# Patient Record
Sex: FEMALE | Race: BLACK OR AFRICAN AMERICAN | NOT HISPANIC OR LATINO | Employment: FULL TIME | URBAN - METROPOLITAN AREA
[De-identification: names, ages, dates, MRNs, and addresses within clinical notes are randomized per-mention and may not be internally consistent; named-entity substitution may affect disease eponyms.]

---

## 2019-03-09 PROBLEM — I10 ESSENTIAL HYPERTENSION: Status: ACTIVE | Noted: 2019-03-09

## 2019-03-09 PROBLEM — E78.2 MIXED HYPERLIPIDEMIA: Status: ACTIVE | Noted: 2019-03-09

## 2019-03-09 PROBLEM — E11.65 UNCONTROLLED TYPE 2 DIABETES MELLITUS WITH HYPERGLYCEMIA: Status: ACTIVE | Noted: 2019-03-09

## 2019-03-09 NOTE — PROGRESS NOTES
Subjective:       Patient ID: Radha Hoffman is a 59 y.o. female.    Chief Complaint: No chief complaint on file.    Diabetes   She presents for her follow-up diabetic visit. She has type 2 diabetes mellitus. Her disease course has been fluctuating. Pertinent negatives for hypoglycemia include no dizziness or hunger. Associated symptoms include fatigue and polydipsia. Pertinent negatives for diabetes include no blurred vision, no foot paresthesias and no weight loss. There are no hypoglycemic complications. Symptoms are worsening. Diabetic complications include peripheral neuropathy. Risk factors for coronary artery disease include diabetes mellitus, dyslipidemia and hypertension. Current diabetic treatment includes insulin injections. She is currently taking insulin pre-breakfast, pre-lunch, pre-dinner and at bedtime. Insulin injections are given by patient. Rotation sites for injection include the abdominal wall. She participates in exercise daily. An ACE inhibitor/angiotensin II receptor blocker is being taken. Eye exam is current (go every 6 months).     Review of Systems   Constitutional: Positive for fatigue. Negative for fever and weight loss.   HENT: Positive for sore throat. Negative for ear pain, postnasal drip, rhinorrhea and sinus pain.    Eyes: Negative for blurred vision and redness.   Respiratory: Negative for cough, chest tightness, shortness of breath and wheezing.    Cardiovascular: Negative for palpitations and leg swelling.   Gastrointestinal: Negative for constipation, diarrhea, nausea and vomiting.   Endocrine: Positive for polydipsia.   Genitourinary: Negative for difficulty urinating and dysuria.   Musculoskeletal: Negative for arthralgias.   Skin: Negative for rash.   Neurological: Negative for dizziness.       Objective:      Physical Exam   Constitutional: She is oriented to person, place, and time. She appears well-developed and well-nourished.   HENT:   Head: Normocephalic and  atraumatic.   Right Ear: Tympanic membrane and external ear normal. Decreased hearing is noted.   Left Ear: Hearing, tympanic membrane and external ear normal.   Nose: Nose normal.   Mouth/Throat: Oropharyngeal exudate and posterior oropharyngeal edema present.   Eyes: Conjunctivae and EOM are normal. Pupils are equal, round, and reactive to light.   Neck: Normal range of motion. Neck supple.   Cardiovascular: Normal rate, regular rhythm and normal heart sounds.   Pulmonary/Chest: Breath sounds normal. She has no wheezes. She has no rales.   Abdominal: Soft. Bowel sounds are normal. She exhibits no distension and no mass. There is no tenderness. There is no guarding.   Musculoskeletal: Normal range of motion. She exhibits no edema or tenderness.   Neurological: She is alert and oriented to person, place, and time. No cranial nerve deficit.   Skin: Skin is warm and dry. No rash noted. No erythema.   Psychiatric: She has a normal mood and affect. Her behavior is normal.       Assessment:       1. Uncontrolled type 2 diabetes mellitus with hyperglycemia    2. Essential hypertension    3. Mixed hyperlipidemia    4. Sore throat        Plan:     I will refill insulin syringes today uin addition to getting labs to see where her blood sugars are and will notify her with results and RTC in 3 months.

## 2019-03-11 ENCOUNTER — OFFICE VISIT (OUTPATIENT)
Dept: FAMILY MEDICINE | Facility: CLINIC | Age: 59
End: 2019-03-11
Payer: MEDICAID

## 2019-03-11 VITALS
DIASTOLIC BLOOD PRESSURE: 77 MMHG | TEMPERATURE: 98 F | HEART RATE: 88 BPM | HEIGHT: 59 IN | OXYGEN SATURATION: 98 % | SYSTOLIC BLOOD PRESSURE: 137 MMHG | WEIGHT: 128 LBS | BODY MASS INDEX: 25.8 KG/M2

## 2019-03-11 DIAGNOSIS — E11.65 UNCONTROLLED TYPE 2 DIABETES MELLITUS WITH HYPERGLYCEMIA: Primary | ICD-10-CM

## 2019-03-11 DIAGNOSIS — I10 ESSENTIAL HYPERTENSION: ICD-10-CM

## 2019-03-11 DIAGNOSIS — E78.2 MIXED HYPERLIPIDEMIA: ICD-10-CM

## 2019-03-11 DIAGNOSIS — J02.9 SORE THROAT: ICD-10-CM

## 2019-03-11 PROCEDURE — 99214 PR OFFICE/OUTPT VISIT, EST, LEVL IV, 30-39 MIN: ICD-10-PCS | Mod: S$GLB,,, | Performed by: FAMILY MEDICINE

## 2019-03-11 PROCEDURE — 99214 OFFICE O/P EST MOD 30 MIN: CPT | Mod: S$GLB,,, | Performed by: FAMILY MEDICINE

## 2019-03-11 RX ORDER — PANTOPRAZOLE SODIUM 40 MG/1
TABLET, DELAYED RELEASE ORAL
COMMUNITY
Start: 2019-02-20 | End: 2019-05-27 | Stop reason: SDUPTHER

## 2019-03-11 RX ORDER — INSULIN GLARGINE 100 [IU]/ML
75 INJECTION, SOLUTION SUBCUTANEOUS NIGHTLY
COMMUNITY
End: 2019-05-14 | Stop reason: CLARIF

## 2019-03-11 RX ORDER — LORATADINE 10 MG/1
TABLET ORAL
COMMUNITY
Start: 2019-02-20 | End: 2019-04-29 | Stop reason: SDUPTHER

## 2019-03-11 RX ORDER — TIMOLOL MALEATE 5 MG/ML
SOLUTION/ DROPS OPHTHALMIC
COMMUNITY
Start: 2019-02-20 | End: 2020-07-09

## 2019-03-11 RX ORDER — DULAGLUTIDE 0.75 MG/.5ML
INJECTION, SOLUTION SUBCUTANEOUS
COMMUNITY
Start: 2019-03-01 | End: 2019-05-03 | Stop reason: CLARIF

## 2019-03-11 RX ORDER — DULOXETIN HYDROCHLORIDE 30 MG/1
CAPSULE, DELAYED RELEASE ORAL
COMMUNITY
Start: 2019-03-03 | End: 2019-12-06 | Stop reason: SDUPTHER

## 2019-03-11 RX ORDER — MIRTAZAPINE 15 MG/1
15 TABLET, FILM COATED ORAL NIGHTLY
COMMUNITY
Start: 2019-03-03 | End: 2020-07-10

## 2019-03-11 RX ORDER — INSULIN LISPRO 100 [IU]/ML
INJECTION, SOLUTION INTRAVENOUS; SUBCUTANEOUS
COMMUNITY
End: 2020-03-16

## 2019-03-11 RX ORDER — LOSARTAN POTASSIUM 100 MG/1
TABLET ORAL
COMMUNITY
Start: 2019-01-23 | End: 2019-04-29 | Stop reason: SDUPTHER

## 2019-03-11 RX ORDER — ATORVASTATIN CALCIUM 40 MG/1
TABLET, FILM COATED ORAL
COMMUNITY
Start: 2019-03-01 | End: 2019-04-02 | Stop reason: SDUPTHER

## 2019-03-11 RX ORDER — MONTELUKAST SODIUM 10 MG/1
TABLET ORAL
COMMUNITY
Start: 2019-02-13 | End: 2019-04-22 | Stop reason: SDUPTHER

## 2019-03-11 RX ORDER — SYRINGE AND NEEDLE,INSULIN,1ML 31GX15/64"
SYRINGE, EMPTY DISPOSABLE MISCELLANEOUS
Qty: 100 SYRINGE | Refills: 5 | COMMUNITY
Start: 2019-03-11 | End: 2019-03-15 | Stop reason: SDUPTHER

## 2019-03-13 ENCOUNTER — TELEPHONE (OUTPATIENT)
Dept: FAMILY MEDICINE | Facility: CLINIC | Age: 59
End: 2019-03-13

## 2019-03-13 RX ORDER — PROMETHAZINE HYDROCHLORIDE AND DEXTROMETHORPHAN HYDROBROMIDE 6.25; 15 MG/5ML; MG/5ML
5 SYRUP ORAL EVERY 6 HOURS PRN
Qty: 140 ML | Refills: 0 | Status: SHIPPED | OUTPATIENT
Start: 2019-03-13 | End: 2019-03-23

## 2019-03-13 NOTE — TELEPHONE ENCOUNTER
----- Message from Angela Hoffman sent at 3/13/2019  2:15 PM CDT -----  Pt is asking for cough suppressant for cough times 2 days, denies fever, pharmacy: [facility] [address];  pts phone # [phone]

## 2019-03-15 DIAGNOSIS — E11.65 UNCONTROLLED TYPE 2 DIABETES MELLITUS WITH HYPERGLYCEMIA: ICD-10-CM

## 2019-03-15 RX ORDER — SYRING-NEEDL,DISP,INSUL,0.3 ML 31GX15/64"
SYRINGE, EMPTY DISPOSABLE MISCELLANEOUS
Qty: 100 SYRINGE | Refills: 11 | Status: SHIPPED | OUTPATIENT
Start: 2019-03-15 | End: 2022-09-14

## 2019-03-15 RX ORDER — INSULIN LISPRO 100 [IU]/ML
INJECTION, SOLUTION INTRAVENOUS; SUBCUTANEOUS
Qty: 15 CARTRIDGE | Refills: 5 | Status: SHIPPED | OUTPATIENT
Start: 2019-03-15 | End: 2020-03-16

## 2019-03-20 ENCOUNTER — TELEPHONE (OUTPATIENT)
Dept: FAMILY MEDICINE | Facility: CLINIC | Age: 59
End: 2019-03-20

## 2019-03-20 DIAGNOSIS — J30.9 ALLERGIC RHINITIS, UNSPECIFIED SEASONALITY, UNSPECIFIED TRIGGER: Primary | ICD-10-CM

## 2019-03-21 NOTE — TELEPHONE ENCOUNTER
Patient requesting a referral to see allergies due to the current sinus and throat pain.  She has seen ENT before but recommendations has not helped.    [facility] will [facility] [place] for further evaluation.

## 2019-04-02 RX ORDER — ATORVASTATIN CALCIUM 40 MG/1
TABLET, FILM COATED ORAL
Qty: 30 TABLET | Refills: 5 | Status: SHIPPED | OUTPATIENT
Start: 2019-04-02 | End: 2019-10-06 | Stop reason: SDUPTHER

## 2019-04-22 RX ORDER — MONTELUKAST SODIUM 10 MG/1
TABLET ORAL
Qty: 30 TABLET | Refills: 5 | Status: SHIPPED | OUTPATIENT
Start: 2019-04-22 | End: 2019-10-27 | Stop reason: SDUPTHER

## 2019-04-29 RX ORDER — LOSARTAN POTASSIUM 100 MG/1
TABLET ORAL
Qty: 30 TABLET | Refills: 5 | Status: SHIPPED | OUTPATIENT
Start: 2019-04-29 | End: 2019-10-27 | Stop reason: SDUPTHER

## 2019-04-29 RX ORDER — LORATADINE 10 MG/1
TABLET ORAL
Qty: 30 TABLET | Refills: 6 | Status: SHIPPED | OUTPATIENT
Start: 2019-04-29 | End: 2020-07-09

## 2019-05-03 DIAGNOSIS — E11.65 UNCONTROLLED TYPE 2 DIABETES MELLITUS WITH HYPERGLYCEMIA: Primary | ICD-10-CM

## 2019-05-14 ENCOUNTER — TELEPHONE (OUTPATIENT)
Dept: FAMILY MEDICINE | Facility: CLINIC | Age: 59
End: 2019-05-14

## 2019-05-14 NOTE — TELEPHONE ENCOUNTER
----- Message from Alicia Quintero sent at 5/14/2019 10:55 AM CDT -----  THE NEW INSULIN YOU PT HER ON THE INS IS NOT COVERING THIS MEDICATION. CAN YOU CALL HER OUT SOMETHING ELSE. SHE DOES NOT REMEMBER THE NAME OF THE MEDICATION!!!

## 2019-05-27 RX ORDER — PANTOPRAZOLE SODIUM 40 MG/1
TABLET, DELAYED RELEASE ORAL
Qty: 30 TABLET | Refills: 6 | Status: SHIPPED | OUTPATIENT
Start: 2019-05-27 | End: 2019-12-06 | Stop reason: SDUPTHER

## 2019-06-10 ENCOUNTER — OFFICE VISIT (OUTPATIENT)
Dept: FAMILY MEDICINE | Facility: CLINIC | Age: 59
End: 2019-06-10
Payer: MEDICAID

## 2019-06-10 VITALS
SYSTOLIC BLOOD PRESSURE: 142 MMHG | WEIGHT: 134 LBS | TEMPERATURE: 98 F | OXYGEN SATURATION: 98 % | BODY MASS INDEX: 27.06 KG/M2 | DIASTOLIC BLOOD PRESSURE: 75 MMHG | HEART RATE: 86 BPM

## 2019-06-10 DIAGNOSIS — E11.8 CONTROLLED TYPE 2 DIABETES MELLITUS WITH COMPLICATION, WITH LONG-TERM CURRENT USE OF INSULIN: ICD-10-CM

## 2019-06-10 DIAGNOSIS — E78.2 MIXED HYPERLIPIDEMIA: ICD-10-CM

## 2019-06-10 DIAGNOSIS — Z79.4 CONTROLLED TYPE 2 DIABETES MELLITUS WITH COMPLICATION, WITH LONG-TERM CURRENT USE OF INSULIN: ICD-10-CM

## 2019-06-10 DIAGNOSIS — I10 ESSENTIAL HYPERTENSION: ICD-10-CM

## 2019-06-10 PROCEDURE — 99214 OFFICE O/P EST MOD 30 MIN: CPT | Mod: S$GLB,,, | Performed by: FAMILY MEDICINE

## 2019-06-10 PROCEDURE — 99214 PR OFFICE/OUTPT VISIT, EST, LEVL IV, 30-39 MIN: ICD-10-PCS | Mod: S$GLB,,, | Performed by: FAMILY MEDICINE

## 2019-06-10 NOTE — PROGRESS NOTES
Subjective:       Patient ID: Radha Hoffman is a 59 y.o. female.    Chief Complaint: Follow-up    59-year-old female in for follow-up.  Patient has a history of diabetes type 2 that had been uncontrolled in the past however at her last visit her hemoglobin A1c had come down to 6.1.  She states she has been really working hard on trying to control her blood sugar much better.  She also has a history of hypertension in her blood pressure is fairly well controlled.  She also has hyperlipidemia and takes her statin faithfully.  She is also seen an allergist specialist in July for a chronic throat irritation.  She reports that she has been doing good and voices no new complaints today.  She does report numbness and tingling of both feet which is likely from neuropathy but does not want to take any medication for it.    Review of Systems   Constitutional: Negative for fever.   HENT: Positive for congestion and sore throat. Negative for ear pain, postnasal drip, rhinorrhea and sinus pain.    Eyes: Negative for redness.   Respiratory: Negative for cough, chest tightness, shortness of breath and wheezing.    Cardiovascular: Negative for chest pain, palpitations and leg swelling.   Gastrointestinal: Negative for constipation, diarrhea, nausea and vomiting.   Genitourinary: Negative for difficulty urinating and dysuria.   Musculoskeletal: Negative for arthralgias.   Skin: Negative for rash.   Neurological: Positive for numbness. Negative for dizziness.       Objective:      Physical Exam   Constitutional: She is oriented to person, place, and time. She appears well-developed and well-nourished.   HENT:   Head: Normocephalic and atraumatic.   Eyes: Pupils are equal, round, and reactive to light. Conjunctivae and EOM are normal.   Neck: Normal range of motion. Neck supple.   Cardiovascular: Normal rate, regular rhythm and normal heart sounds.   Pulmonary/Chest: Breath sounds normal. She has no wheezes. She has no rales.    Abdominal: Soft. Bowel sounds are normal. She exhibits no distension and no mass. There is no tenderness. There is no guarding.   Musculoskeletal: Normal range of motion. She exhibits no edema or tenderness.   Neurological: She is alert and oriented to person, place, and time. No cranial nerve deficit.   Skin: Skin is warm and dry. No rash noted. No erythema.   Psychiatric: She has a normal mood and affect. Her behavior is normal.       Assessment:       1. Controlled type 2 diabetes mellitus with complication, with long-term current use of insulin    2. Essential hypertension    3. Mixed hyperlipidemia        Plan:     diabetes type 2 is chronic and stable on current meds.  Hypertension is chronic and stable on current meds.  Hyperlipidemia is chronic and stable on current meds.  An order for fasting labs will be given today to be done 1 week prior to the next visit in 3 months.  Follow-up in 3 months.

## 2019-07-09 RX ORDER — PEN NEEDLE, DIABETIC 30 GX3/16"
NEEDLE, DISPOSABLE MISCELLANEOUS
Qty: 50 EACH | Refills: 11 | Status: SHIPPED | OUTPATIENT
Start: 2019-07-09 | End: 2020-02-12

## 2019-08-02 RX ORDER — DIPHENOXYLATE HYDROCHLORIDE AND ATROPINE SULFATE 2.5; .025 MG/1; MG/1
TABLET ORAL
Qty: 30 TABLET | Refills: 2 | Status: SHIPPED | OUTPATIENT
Start: 2019-08-02 | End: 2019-12-03 | Stop reason: SDUPTHER

## 2019-09-09 LAB
ALBUMIN SERPL BCP-MCNC: 3.6 G/DL (ref 3.4–5)
ALBUMIN/GLOBULIN RATIO: 0.88 RATIO (ref 1.1–1.8)
ALP SERPL-CCNC: 110 U/L (ref 46–116)
ALT SERPL W P-5'-P-CCNC: 30 U/L (ref 12–78)
AST SERPL-CCNC: 17 U/L (ref 15–37)
BASOPHILS NFR SNV MANUAL: 0.5 % (ref 0–3)
BILIRUB SERPL-MCNC: 0.4 MG/DL (ref 0–1)
BUN SERPL-MCNC: 10 MG/DL (ref 7–18)
BUN/CREAT SERPL: 9.8 RATIO (ref 7–18)
CALCIUM SERPL-MCNC: 9.8 MG/DL (ref 8.8–10.5)
CHLORIDE SERPL-SCNC: 103 MMOL/L (ref 100–108)
CHOLEST SERPL-MSCNC: 136 MG/DL
CO2 SERPL-SCNC: 33 MMOL/L (ref 21–32)
CREAT SERPL-MCNC: 1.02 MG/DL (ref 0.55–1.02)
EOSINOPHIL NFR SNV MANUAL: 1.7 % (ref 1–3)
ERYTHROCYTE [DISTWIDTH] IN BLOOD BY AUTOMATED COUNT: 12.3 % (ref 12.5–18)
GFR ESTIMATION: > 60
GLOBULIN: 4.1 G/DL (ref 2.3–3.5)
GLUCOSE SERPL-MCNC: 298 MG/DL (ref 70–110)
HBA1C MFR BLD: 12.6 % (ref 4.5–6.2)
HCT VFR BLD AUTO: 40.3 % (ref 37–47)
HDL/CHOLESTEROL RATIO: 4 RATIO
HDLC SERPL-MCNC: 32 MG/DL (ref 39–96)
HGB BLD-MCNC: 14 G/DL (ref 12–16)
LDLC SERPL CALC-MCNC: 54 MG/DL
LYMPHOCYTES NFR SNV MANUAL: 43.1 % (ref 25–40)
MANUAL NRBC PER 100 CELLS: 0 %
MCH RBC QN AUTO: 30.7 PG (ref 27–31.2)
MCHC RBC AUTO-ENTMCNC: 34.7 G/DL (ref 31.8–35.4)
MCV RBC AUTO: 88.4 FL (ref 80–97)
MONOCYTES/100 LEUKOCYTES: 6.6 % (ref 1–15)
NEUTROPHILS NFR BLD: 3.04 10*3/UL (ref 1.8–7.7)
NEUTROPHILS NFR SNV MANUAL: 47.8 % (ref 37–80)
PLATELETS: 334 10*3/UL (ref 142–424)
POTASSIUM SERPL-SCNC: 4.1 MMOL/L (ref 3.6–5.2)
PROT SERPL-MCNC: 7.7 G/DL (ref 6.4–8.2)
RBC # BLD AUTO: 4.56 10*6/UL (ref 4.2–5.4)
SODIUM BLD-SCNC: 139 MMOL/L (ref 135–145)
TRIGL SERPL-MCNC: 252 MG/DL (ref 30–200)
TSH SERPL DL<=0.005 MIU/L-ACNC: 0.73 UIU/ML (ref 0.36–3.74)
VLDL CHOLESTEROL: 50 MG/DL (ref 0–40)
WBC # BLD: 6.4 10*3/UL (ref 4.6–10.2)

## 2019-09-16 ENCOUNTER — OFFICE VISIT (OUTPATIENT)
Dept: FAMILY MEDICINE | Facility: CLINIC | Age: 59
End: 2019-09-16
Payer: MEDICAID

## 2019-09-16 VITALS
BODY MASS INDEX: 27.82 KG/M2 | OXYGEN SATURATION: 98 % | WEIGHT: 138 LBS | HEIGHT: 59 IN | SYSTOLIC BLOOD PRESSURE: 143 MMHG | HEART RATE: 89 BPM | TEMPERATURE: 98 F | DIASTOLIC BLOOD PRESSURE: 81 MMHG

## 2019-09-16 DIAGNOSIS — E11.65 UNCONTROLLED TYPE 2 DIABETES MELLITUS WITH HYPERGLYCEMIA: Primary | ICD-10-CM

## 2019-09-16 DIAGNOSIS — I10 ESSENTIAL HYPERTENSION: ICD-10-CM

## 2019-09-16 PROCEDURE — 99214 OFFICE O/P EST MOD 30 MIN: CPT | Mod: S$GLB,,, | Performed by: FAMILY MEDICINE

## 2019-09-16 PROCEDURE — 99214 PR OFFICE/OUTPT VISIT, EST, LEVL IV, 30-39 MIN: ICD-10-PCS | Mod: S$GLB,,, | Performed by: FAMILY MEDICINE

## 2019-09-16 RX ORDER — INSULIN GLARGINE 100 [IU]/ML
INJECTION, SOLUTION SUBCUTANEOUS
COMMUNITY
End: 2019-09-16

## 2019-09-16 RX ORDER — GLIPIZIDE 10 MG/1
TABLET, FILM COATED, EXTENDED RELEASE ORAL
COMMUNITY
End: 2019-09-16

## 2019-09-16 RX ORDER — GLIMEPIRIDE 2 MG/1
2 TABLET ORAL
Qty: 60 TABLET | Refills: 5 | Status: SHIPPED | OUTPATIENT
Start: 2019-09-16 | End: 2019-09-30 | Stop reason: CLARIF

## 2019-09-16 RX ORDER — INSULIN GLARGINE 100 [IU]/ML
50 INJECTION, SOLUTION SUBCUTANEOUS DAILY
Qty: 15 ML | Refills: 3 | Status: SHIPPED | OUTPATIENT
Start: 2019-09-16 | End: 2020-02-11

## 2019-09-16 RX ORDER — FLUTICASONE PROPIONATE 50 MCG
SPRAY, SUSPENSION (ML) NASAL
COMMUNITY
End: 2019-10-18 | Stop reason: SDUPTHER

## 2019-09-16 NOTE — PROGRESS NOTES
Subjective:      Patient ID: Radha Hoffman is a 59 y.o. female.    Chief Complaint: Follow-up and Diabetes    59-year-old female in for follow-up.  She recently had labs done in her hemoglobin A1c was 12.6.  Patient states she is only taking Humalog oak per sliding scale.  When she checks her blood sugar if it is in a certain range then she take the Humalog.  She is not on any of the oral medications or the Bydureon that was prescribed.  She is not taking Levemir as prescribed either.  She has not been compliant with diet or medications.    Hypertension: Patient here for follow-up of elevated blood pressure. She is not exercising and is not adherent to low salt diet.  Blood pressure is well controlled at home. Cardiac symptoms none. Patient denies chest pain, dyspnea and palpitations.  Cardiovascular risk factors: diabetes mellitus, dyslipidemia and hypertension. Use of agents associated with hypertension: none. History of target organ damage: none.    Review of Systems   Constitutional: Negative for fever.   HENT: Negative for ear pain, postnasal drip, rhinorrhea, sinus pain and sore throat.    Eyes: Negative for redness.   Respiratory: Negative for cough, chest tightness, shortness of breath and wheezing.    Cardiovascular: Negative for chest pain, palpitations and leg swelling.   Gastrointestinal: Negative for constipation, diarrhea, nausea and vomiting.   Genitourinary: Negative for difficulty urinating and dysuria.   Musculoskeletal: Negative for arthralgias.   Skin: Negative for rash.   Neurological: Negative for dizziness.     Medication List with Changes/Refills   New Medications    EXENATIDE MICROSPHERES (BYDUREON) 2 MG/0.65 ML PNIJ    Inject 2 mg into the skin every 7 days.    GLIMEPIRIDE (AMARYL) 2 MG TABLET    Take 1 tablet (2 mg total) by mouth before breakfast.    INSULIN (LANTUS SOLOSTAR U-100 INSULIN) GLARGINE 100 UNITS/ML (3ML) SUBQ PEN    Inject 50 Units into the skin once daily.   Current  "Medications    ALLERGY RELIEF, LORATADINE, 10 MG TABLET    TAKE 1 TABLET BY MOUTH ONCE DAILY    ATORVASTATIN (LIPITOR) 40 MG TABLET    TAKE 1 TABLET BY MOUTH ONCE DAILY FOR 30 DAYS    BD VEO INSULIN SYRINGE UF 1 ML 31 GAUGE X 15/64" SYRG    USE AS DIRECTED    DIPHENOXYLATE-ATROPINE 2.5-0.025 MG (LOMOTIL) 2.5-0.025 MG PER TABLET    TAKE 1 TABLET BY MOUTH EVERY 8 HOURS FOR 10 DAYS    DULOXETINE (CYMBALTA) 30 MG CAPSULE        FLUTICASONE PROPIONATE (FLONASE) 50 MCG/ACTUATION NASAL SPRAY    fluticasone 50 mcg/actuation nasal spray,suspension    HUMALOG U-100 INSULIN 100 UNIT/ML CRTG    INJECT 15 UNITS SUB Q THREE TIMES A DAY WITH EACH MEAL    INSULIN LISPRO 100 UNIT/ML INJECTION    Inject into the skin 3 (three) times daily before meals.    LOSARTAN (COZAAR) 100 MG TABLET    TAKE 1 TABLET BY MOUTH ONCE DAILY FOR 30 DAYS    MIRTAZAPINE (REMERON) 15 MG TABLET        MONTELUKAST (SINGULAIR) 10 MG TABLET    TAKE 1 TABLET BY MOUTH ONCE DAILY FOR 30 DAYS    ONETOUCH ULTRA BLUE TEST STRIP STRP    USE  STRIP TO CHECK GLUCOSE THREE TIMES DAILY    PANTOPRAZOLE (PROTONIX) 40 MG TABLET    TAKE 1 TABLET BY MOUTH ONCE DAILY    PEN NEEDLE, DIABETIC 31 GAUGE X 5/16" NDLE    USE TO GIVE INSULIN ONCE A DAY AT BEDTIME    RANITIDINE (ZANTAC) 150 MG TABLET    TAKE 1 TABLET BY MOUTH TWICE DAILY FOR 30 DAYS    TIMOLOL MALEATE 0.5% (TIMOPTIC) 0.5 % DROP       Discontinued Medications    EXENATIDE MICROSPHERES (BYDUREON BCISE) 2 MG/0.85 ML ATIN    Inject 2 mg into the skin every 7 days.    GLIPIZIDE (GLUCOTROL) 10 MG TR24    glipizide ER 10 mg tablet, extended release 24 hr    INSULIN (BASAGLAR KWIKPEN U-100 INSULIN) GLARGINE 100 UNITS/ML (3ML) SUBQ PEN    Basaglar KwikPen U-100 Insulin 100 unit/mL (3 mL) subcutaneous    INSULIN DETEMIR U-100 (LEVEMIR FLEXTOUCH U-100 INSULN) 100 UNIT/ML (3 ML) SUBQ INPN PEN    Inject 70 Units into the skin every evening.      Objective:   BP (!) 143/81 (BP Location: Left arm, Patient Position: Sitting, BP " "Method: Medium (Automatic))   Pulse 89   Temp 97.8 °F (36.6 °C) (Temporal)   Ht 4' 11" (1.499 m)   Wt 62.6 kg (138 lb)   SpO2 98%   BMI 27.87 kg/m²    Estimated body mass index is 27.87 kg/m² as calculated from the following:    Height as of this encounter: 4' 11" (1.499 m).    Weight as of this encounter: 62.6 kg (138 lb).   Physical Exam   Constitutional: She is oriented to person, place, and time. Vital signs are normal. She appears well-developed and well-nourished.   HENT:   Head: Normocephalic and atraumatic.   Right Ear: Hearing and tympanic membrane normal.   Left Ear: Hearing and tympanic membrane normal.   Nose: Nose normal.   Mouth/Throat: Uvula is midline, oropharynx is clear and moist and mucous membranes are normal.   Eyes: Pupils are equal, round, and reactive to light. Conjunctivae and EOM are normal.   Neck: Normal range of motion. Neck supple.   Cardiovascular: Normal rate, regular rhythm and normal heart sounds.   Pulmonary/Chest: Breath sounds normal. She has no wheezes. She has no rales.   Abdominal: Soft. Bowel sounds are normal. She exhibits no distension and no mass. There is no tenderness. There is no guarding.   Musculoskeletal: Normal range of motion. She exhibits no edema or tenderness.   Neurological: She is alert and oriented to person, place, and time. No cranial nerve deficit.   Skin: Skin is warm and dry. No rash noted. No erythema.   Psychiatric: She has a normal mood and affect. Her speech is normal and behavior is normal. Judgment and thought content normal. Cognition and memory are normal.   Nursing note and vitals reviewed.    Lab Results   Component Value Date    WBC 6.4 09/09/2019    HGB 14.0 09/09/2019    HCT 40.3 09/09/2019    CHOL 136 09/09/2019    TRIG 252 (H) 09/09/2019    HDL 32 (L) 09/09/2019    ALT 30 09/09/2019    AST 17 09/09/2019     09/09/2019    K 4.1 09/09/2019     09/09/2019    CREATININE 1.02 09/09/2019    BUN 10 09/09/2019    CO2 33 (H) " 09/09/2019    TSH 0.73 09/09/2019    HGBA1C 12.6 (H) 09/09/2019      Assessment:      Problem List Items Addressed This Visit        Cardiac/Vascular    Essential hypertension    Current Assessment & Plan       Hypertension is chronic and usually well controlled on losartan.            Endocrine    Uncontrolled type 2 diabetes mellitus with hyperglycemia - Primary    Current Assessment & Plan       Diabetes is chronic and not well controlled.  I spent most of this visit educating patient on her medication that she should be taking and proper way to take them.  I will restart her on Lantus 50 units q.h.s. every day, Bydureon 2 mg subcu weekly and glimepiride 2 mg by mouth twice a day.  She was unable to take metformin due to side effects.  Patient reports she has not been compliant with medications due to side effects.  Her blood sugar was dropping too low and she will get weak so she stopped all her meds except humalog.         Relevant Medications    glimepiride (AMARYL) 2 MG tablet    insulin (LANTUS SOLOSTAR U-100 INSULIN) glargine 100 units/mL (3mL) SubQ pen    exenatide microspheres (BYDUREON) 2 mg/0.65 mL PnIj    Other Relevant Orders    Hemoglobin A1c    Comprehensive metabolic panel    CBC auto differential    Lipid panel    TSH           Plan:   Patient will be given a refill of Bydureon, Lantus and glimepiride.  She is strongly encouraged to be compliant with diet and all medication as directed and notify me if she is having any side effects.  She will have orders to repeat prior to her follow-up visit in 3 months.

## 2019-09-17 NOTE — ASSESSMENT & PLAN NOTE
Diabetes is chronic and not well controlled.  I spent most of this visit educating patient on her medication that she should be taking and proper way to take them.  I will restart her on Lantus 50 units q.h.s. every day, Bydureon 2 mg subcu weekly and glimepiride 2 mg by mouth twice a day.  She was unable to take metformin due to side effects.  Patient reports she has not been compliant with medications due to side effects.  Her blood sugar was dropping too low and she will get weak so she stopped all her meds except humalog.

## 2019-09-20 ENCOUNTER — TELEPHONE (OUTPATIENT)
Dept: FAMILY MEDICINE | Facility: CLINIC | Age: 59
End: 2019-09-20

## 2019-09-20 NOTE — TELEPHONE ENCOUNTER
Patient was started on Bydureon once weekly to to help lower her blood sugar however insurance denied it due to they are requesting proved that she tried metformin.  Patient states it was many years ago when she tried metformin and was unable to tolerate it due to diarrhea.  I will have patient's daughter call on her behalf to see she can appeal this decision.  We will give her 3 samples of I during intends in the meantime.

## 2019-09-30 ENCOUNTER — TELEPHONE (OUTPATIENT)
Dept: FAMILY MEDICINE | Facility: CLINIC | Age: 59
End: 2019-09-30

## 2019-09-30 DIAGNOSIS — E11.65 UNCONTROLLED TYPE 2 DIABETES MELLITUS WITH HYPERGLYCEMIA: ICD-10-CM

## 2019-09-30 DIAGNOSIS — E11.65 UNCONTROLLED TYPE 2 DIABETES MELLITUS WITH HYPERGLYCEMIA: Primary | ICD-10-CM

## 2019-09-30 RX ORDER — GLIMEPIRIDE 2 MG/1
TABLET ORAL
Qty: 60 TABLET | Refills: 3 | Status: SHIPPED | OUTPATIENT
Start: 2019-09-30 | End: 2020-02-11

## 2019-09-30 NOTE — TELEPHONE ENCOUNTER
----- Message from Jessica Pate sent at 9/30/2019  3:08 PM CDT -----  Contact: patients daughter Leanna Hoffman [phone]  Patients daughter called stating Dr Gamino told her to call her about the diabetes injection that medicaid is not covering (Bydureon).Glimepiride pharmacy gave for once a day and she takes twice a day  Pharmacy [facility] on [address]

## 2019-10-05 DIAGNOSIS — E11.65 UNCONTROLLED TYPE 2 DIABETES MELLITUS WITH HYPERGLYCEMIA: Primary | ICD-10-CM

## 2019-10-05 RX ORDER — METFORMIN HYDROCHLORIDE 500 MG/1
500 TABLET ORAL
Qty: 60 TABLET | Refills: 3 | Status: SHIPPED | OUTPATIENT
Start: 2019-10-05 | End: 2020-07-09

## 2019-10-06 RX ORDER — ATORVASTATIN CALCIUM 40 MG/1
TABLET, FILM COATED ORAL
Qty: 30 TABLET | Refills: 5 | Status: SHIPPED | OUTPATIENT
Start: 2019-10-06 | End: 2020-05-08 | Stop reason: SDUPTHER

## 2019-10-18 RX ORDER — FLUTICASONE PROPIONATE 50 MCG
SPRAY, SUSPENSION (ML) NASAL
Qty: 1 BOTTLE | Refills: 3 | Status: SHIPPED | OUTPATIENT
Start: 2019-10-18 | End: 2020-07-09

## 2019-10-24 DIAGNOSIS — E11.65 UNCONTROLLED TYPE 2 DIABETES MELLITUS WITH HYPERGLYCEMIA: ICD-10-CM

## 2019-10-27 ENCOUNTER — TELEPHONE (OUTPATIENT)
Dept: FAMILY MEDICINE | Facility: CLINIC | Age: 59
End: 2019-10-27

## 2019-10-27 RX ORDER — MONTELUKAST SODIUM 10 MG/1
TABLET ORAL
Qty: 30 TABLET | Refills: 5 | Status: SHIPPED | OUTPATIENT
Start: 2019-10-27 | End: 2020-07-09 | Stop reason: SDUPTHER

## 2019-10-27 RX ORDER — LOSARTAN POTASSIUM 100 MG/1
TABLET ORAL
Qty: 30 TABLET | Refills: 5 | Status: SHIPPED | OUTPATIENT
Start: 2019-10-27 | End: 2020-07-09 | Stop reason: SDUPTHER

## 2019-10-28 NOTE — TELEPHONE ENCOUNTER
----- Message from Jessica Pate sent at 10/28/2019 10:06 AM CDT -----  Contact: patient  Was calling to see if there were any samples of the once a week injection for diabetes

## 2019-11-15 ENCOUNTER — TELEPHONE (OUTPATIENT)
Dept: FAMILY MEDICINE | Facility: CLINIC | Age: 59
End: 2019-11-15

## 2019-11-15 NOTE — TELEPHONE ENCOUNTER
----- Message from Jessica Pate sent at 11/15/2019  8:17 AM CST -----  Contact: patient  Needs a sample of Byoderon

## 2019-12-04 RX ORDER — DIPHENOXYLATE HYDROCHLORIDE AND ATROPINE SULFATE 2.5; .025 MG/1; MG/1
TABLET ORAL
Qty: 30 TABLET | Refills: 2 | Status: SHIPPED | OUTPATIENT
Start: 2019-12-04 | End: 2020-04-02

## 2019-12-06 RX ORDER — PANTOPRAZOLE SODIUM 40 MG/1
40 TABLET, DELAYED RELEASE ORAL DAILY
Qty: 30 TABLET | Refills: 5 | Status: SHIPPED | OUTPATIENT
Start: 2019-12-06 | End: 2020-07-09

## 2019-12-06 RX ORDER — DULOXETIN HYDROCHLORIDE 30 MG/1
30 CAPSULE, DELAYED RELEASE ORAL DAILY
Qty: 30 CAPSULE | Refills: 5 | Status: SHIPPED | OUTPATIENT
Start: 2019-12-06 | End: 2020-07-10

## 2019-12-06 NOTE — TELEPHONE ENCOUNTER
----- Message from Alexandra Mathews sent at 12/6/2019 10:01 AM CST -----  Pt pharmacy told her these medications are on recall she will have to be prescribed something else    losartan (COZAAR) 100 MG tablet   ranitidine (ZANTAC) 150 MG tablet       Refill request    pantoprazole (PROTONIX) 40 MG tablet    DULoxetine (CYMBALTA) 30 MG capsule

## 2019-12-09 LAB
ABS NRBC COUNT: 0 X 10 3/UL (ref 0–0.01)
ABSOLUTE BASOPHIL: 0.03 X 10 3/UL (ref 0–0.22)
ABSOLUTE EOSINOPHIL: 0.17 X 10 3/UL (ref 0.04–0.54)
ABSOLUTE IMMATURE GRAN: 0.02 X 10 3/UL (ref 0–0.04)
ABSOLUTE LYMPHOCYTE: 2.49 X 10 3/UL (ref 0.86–4.75)
ABSOLUTE MONOCYTE: 0.45 X 10 3/UL (ref 0.22–1.08)
ALBUMIN SERPL-MCNC: 4.2 G/DL (ref 3.5–5.2)
ALBUMIN/GLOB SERPL ELPH: 1.4 {RATIO} (ref 1–2.7)
ALP ISOS SERPL LEV INH-CCNC: 94 U/L (ref 35–105)
ALT (SGPT): 22 U/L (ref 0–33)
ANION GAP SERPL CALC-SCNC: 8 MMOL/L (ref 8–17)
AST SERPL-CCNC: 21 U/L (ref 0–32)
BASOPHILS NFR BLD: 0.5 % (ref 0.2–1.2)
BILIRUBIN, TOTAL: 0.36 MG/DL (ref 0–1.2)
BUN/CREAT SERPL: 8 (ref 6–20)
CALCIUM SERPL-MCNC: 9.8 MG/DL (ref 8.6–10.2)
CARBON DIOXIDE, CO2: 31 MMOL/L (ref 22–29)
CHLORIDE: 103 MMOL/L (ref 98–107)
CHOLEST SERPL-MSCNC: 102 MG/DL (ref 100–200)
CREAT SERPL-MCNC: 0.8 MG/DL (ref 0.5–0.9)
EOSINOPHIL NFR BLD: 2.6 % (ref 0.7–7)
ESTIMATED AVERAGE GLUCOSE: 287 MG/DL
GFR ESTIMATION: 72.92
GLOBULIN: 2.9 G/DL (ref 1.5–4.5)
GLUCOSE: 213 MG/DL (ref 82–115)
HBA1C MFR BLD: 11.6 % (ref 4–6)
HCT VFR BLD AUTO: 39.5 % (ref 37–47)
HDLC SERPL-MCNC: 31 MG/DL
HGB BLD-MCNC: 13 G/DL (ref 12–16)
IMMATURE GRANULOCYTES: 0.3 % (ref 0–0.5)
LDL/HDL RATIO: 1.3 (ref 1–3)
LDLC SERPL CALC-MCNC: 41.6 MG/DL (ref 0–100)
LYMPHOCYTES NFR BLD: 38.2 % (ref 19.3–53.1)
MCH RBC QN AUTO: 30.3 PG (ref 27–32)
MCHC RBC AUTO-ENTMCNC: 32.9 G/DL (ref 32–36)
MCV RBC AUTO: 92.1 FL (ref 82–100)
MONOCYTES NFR BLD: 6.9 % (ref 4.7–12.5)
NEUTROPHILS ABSOLUTE COUNT: 3.36 X 10 3/UL (ref 2.15–7.56)
NEUTROPHILS NFR BLD: 51.5 %
NUCLEATED RED BLOOD CELLS: 0 /100 WBC (ref 0–0.2)
PLATELET # BLD AUTO: 320 X 10 3/UL (ref 135–400)
POTASSIUM: 5.1 MMOL/L (ref 3.5–5.1)
PROT SNV-MCNC: 7.1 G/DL (ref 6.4–8.3)
RBC # BLD AUTO: 4.29 X 10 6/UL (ref 4.2–5.4)
RDW-SD: 42.6 FL (ref 37–54)
SODIUM: 142 MMOL/L (ref 136–145)
TRIGL SERPL-MCNC: 147 MG/DL (ref 0–150)
TSH SERPL DL<=0.005 MIU/L-ACNC: 0.52 UIU/ML (ref 0.27–4.2)
UREA NITROGEN (BUN): 6.4 MG/DL (ref 8–23)
WBC # BLD: 6.52 X 10 3/UL (ref 4.3–10.8)

## 2019-12-11 DIAGNOSIS — E11.65 UNCONTROLLED TYPE 2 DIABETES MELLITUS WITH HYPERGLYCEMIA: Primary | ICD-10-CM

## 2019-12-16 ENCOUNTER — OFFICE VISIT (OUTPATIENT)
Dept: FAMILY MEDICINE | Facility: CLINIC | Age: 59
End: 2019-12-16
Payer: MEDICAID

## 2019-12-16 VITALS
SYSTOLIC BLOOD PRESSURE: 140 MMHG | WEIGHT: 138.25 LBS | DIASTOLIC BLOOD PRESSURE: 72 MMHG | OXYGEN SATURATION: 98 % | HEIGHT: 59 IN | HEART RATE: 78 BPM | BODY MASS INDEX: 27.87 KG/M2 | TEMPERATURE: 98 F

## 2019-12-16 DIAGNOSIS — E11.65 UNCONTROLLED TYPE 2 DIABETES MELLITUS WITH HYPERGLYCEMIA: ICD-10-CM

## 2019-12-16 DIAGNOSIS — E78.2 MIXED HYPERLIPIDEMIA: ICD-10-CM

## 2019-12-16 DIAGNOSIS — K21.9 GERD WITHOUT ESOPHAGITIS: ICD-10-CM

## 2019-12-16 DIAGNOSIS — I10 ESSENTIAL HYPERTENSION: ICD-10-CM

## 2019-12-16 DIAGNOSIS — R19.7 DIARRHEA, UNSPECIFIED TYPE: ICD-10-CM

## 2019-12-16 PROCEDURE — 99214 PR OFFICE/OUTPT VISIT, EST, LEVL IV, 30-39 MIN: ICD-10-PCS | Mod: S$GLB,,, | Performed by: FAMILY MEDICINE

## 2019-12-16 PROCEDURE — 99214 OFFICE O/P EST MOD 30 MIN: CPT | Mod: S$GLB,,, | Performed by: FAMILY MEDICINE

## 2019-12-16 RX ORDER — LORATADINE 10 MG/1
TABLET ORAL
COMMUNITY
End: 2020-07-09

## 2019-12-17 NOTE — PROGRESS NOTES
Subjective:      Patient ID: Radha Hoffman is a 59 y.o. female.    Chief Complaint: Follow-up and Diabetes    60 yo female in for follow upon diabetes.  She finally was able to get Bydureon approved by her insurance.  She has been taking Lantus 60 units daily. She has been with a fasting blood glucose in the 250's range.  She was unable to tolerate the metformin because of diarrhea.  She reports that she has been having diarrhea that has been persistent over the past couple of weeks. She states that she has not had any abdominal pain.  She has been taking Lomotil which has helped. She also has been out of her pantoprazole and ranitidine because the insurance would  Not pay for it and she has been with increased GERD.      Hypertension   This is a chronic problem. The current episode started more than 1 year ago. The problem is unchanged. The problem is controlled. Pertinent negatives include no chest pain, palpitations or shortness of breath. There are no associated agents to hypertension. Risk factors for coronary artery disease include diabetes mellitus, dyslipidemia and sedentary lifestyle. Past treatments include angiotensin blockers.   Hyperlipidemia   This is a chronic problem. The current episode started more than 1 year ago. The problem is controlled. Recent lipid tests were reviewed and are normal. Exacerbating diseases include diabetes. Pertinent negatives include no chest pain or shortness of breath. Current antihyperlipidemic treatment includes statins. Risk factors for coronary artery disease include hypertension, dyslipidemia, diabetes mellitus and a sedentary lifestyle.     Review of Systems   Constitutional: Negative for fever.   HENT: Negative for ear pain, postnasal drip, rhinorrhea, sinus pain and sore throat.    Eyes: Negative for redness.   Respiratory: Negative for cough, chest tightness, shortness of breath and wheezing.    Cardiovascular: Negative for chest pain, palpitations and leg  "swelling.   Gastrointestinal: Positive for diarrhea. Negative for constipation, nausea and vomiting.        GERD   Genitourinary: Negative for difficulty urinating and dysuria.   Musculoskeletal: Negative for arthralgias.   Skin: Negative for rash.   Neurological: Negative for dizziness.     Medication List with Changes/Refills   Current Medications    ALLERGY RELIEF, LORATADINE, 10 MG TABLET    TAKE 1 TABLET BY MOUTH ONCE DAILY    ATORVASTATIN (LIPITOR) 40 MG TABLET    TAKE 1 TABLET BY MOUTH ONCE DAILY FOR 30 DAYS    BD VEO INSULIN SYRINGE UF 1 ML 31 GAUGE X 15/64" SYRG    USE AS DIRECTED    DIPHENOXYLATE-ATROPINE 2.5-0.025 MG (LOMOTIL) 2.5-0.025 MG PER TABLET    TAKE 1 TABLET BY MOUTH EVERY 8 HOURS FOR 10 DAYS    DULOXETINE (CYMBALTA) 30 MG CAPSULE    Take 1 capsule (30 mg total) by mouth once daily.    EXENATIDE MICROSPHERES (BYDUREON) 2 MG/0.65 ML PNIJ    Inject 2 mg into the skin every 7 days.    FLUTICASONE PROPIONATE (FLONASE) 50 MCG/ACTUATION NASAL SPRAY    USE ONE SPRAY(S) IN EACH NOSTRIL ONCE DAILY FOR 30 DAYS    GLIMEPIRIDE (AMARYL) 2 MG TABLET    Take 2 tablets by mouth daily    HUMALOG U-100 INSULIN 100 UNIT/ML CRTG    INJECT 15 UNITS SUB Q THREE TIMES A DAY WITH EACH MEAL    INSULIN (LANTUS SOLOSTAR U-100 INSULIN) GLARGINE 100 UNITS/ML (3ML) SUBQ PEN    Inject 50 Units into the skin once daily.    INSULIN LISPRO 100 UNIT/ML INJECTION    Inject into the skin 3 (three) times daily before meals.    LORATADINE (CLARITIN) 10 MG TABLET    loratadine 10 mg tablet    LOSARTAN (COZAAR) 100 MG TABLET    TAKE 1 TABLET BY MOUTH ONCE DAILY FOR 30 DAYS    METFORMIN (GLUCOPHAGE) 500 MG TABLET    Take 1 tablet (500 mg total) by mouth daily with breakfast.    MIRTAZAPINE (REMERON) 15 MG TABLET        MONTELUKAST (SINGULAIR) 10 MG TABLET    TAKE 1 TABLET BY MOUTH ONCE DAILY FOR 30 DAYS    ONETOUCH ULTRA BLUE TEST STRIP STRP    USE  STRIP TO CHECK GLUCOSE THREE TIMES DAILY    PANTOPRAZOLE (PROTONIX) 40 MG TABLET    Take " "1 tablet (40 mg total) by mouth once daily.    PEN NEEDLE, DIABETIC 31 GAUGE X 5/16" NDLE    USE TO GIVE INSULIN ONCE A DAY AT BEDTIME    TIMOLOL MALEATE 0.5% (TIMOPTIC) 0.5 % DROP          Objective:   BP (!) 140/72 (BP Location: Left arm, Patient Position: Sitting, BP Method: Medium (Automatic))   Pulse 78   Temp 97.7 °F (36.5 °C)   Ht 4' 11" (1.499 m)   Wt 62.7 kg (138 lb 4 oz)   SpO2 98%   BMI 27.92 kg/m²    Estimated body mass index is 27.92 kg/m² as calculated from the following:    Height as of this encounter: 4' 11" (1.499 m).    Weight as of this encounter: 62.7 kg (138 lb 4 oz).   Physical Exam   Constitutional: She is oriented to person, place, and time. Vital signs are normal. She appears well-developed and well-nourished.   HENT:   Head: Normocephalic and atraumatic.   Right Ear: Hearing and tympanic membrane normal.   Left Ear: Hearing and tympanic membrane normal.   Nose: Nose normal.   Mouth/Throat: Uvula is midline, oropharynx is clear and moist and mucous membranes are normal.   Eyes: Pupils are equal, round, and reactive to light. Conjunctivae, EOM and lids are normal.   Neck: Normal range of motion. Neck supple.   Cardiovascular: Normal rate, regular rhythm and normal heart sounds.   Pulmonary/Chest: Breath sounds normal. She has no wheezes. She has no rales.   Abdominal: Soft. Bowel sounds are normal. She exhibits no distension and no mass. There is no tenderness. There is no guarding.   Musculoskeletal: Normal range of motion. She exhibits no edema or tenderness.   Neurological: She is alert and oriented to person, place, and time. No cranial nerve deficit.   Skin: Skin is warm and dry. No rash noted. No erythema.   Psychiatric: She has a normal mood and affect. Her speech is normal and behavior is normal. Judgment and thought content normal. Cognition and memory are normal.   Nursing note and vitals reviewed.    Lab Results   Component Value Date    WBC 6.52 12/09/2019    HGB 13.0 " 12/09/2019    HCT 39.5 12/09/2019     12/09/2019    CHOL 102 12/09/2019    TRIG 147 12/09/2019    HDL 31 (L) 12/09/2019    ALT 30 09/09/2019    AST 21 12/09/2019     12/09/2019    K 5.1 12/09/2019     12/09/2019    CREATININE 0.80 12/09/2019    BUN 6.4 (L) 12/09/2019    CO2 31 (H) 12/09/2019    TSH 0.52 12/09/2019    HGBA1C 11.6 (H) 12/09/2019      Assessment:      Problem List Items Addressed This Visit        Cardiac/Vascular    Essential hypertension    Mixed hyperlipidemia       Endocrine    Uncontrolled type 2 diabetes mellitus with hyperglycemia       GI    Diarrhea    Relevant Orders    Stool Exam-Ova,Cysts,Parasites    Stool culture           Plan:   Diabetes is not well controlled on current medications. We were recently able to get the Bydureon covered. She is taking Lantus and glimepiride as well.  She stopped metformin because of side effects of diarrhea. Patient to increase Lantus by 2 units every week until the fasting blood sugar is near 150 consistently.  Hypertension is chronic and controlled on losartan.  Hyperlipidemia is chronic and controlled on atorvastatin.  She is to get Prilosec OTC and Pepcid OTC for GERD.  Check OCP and stool culture to evaluate diarrhea.

## 2019-12-18 DIAGNOSIS — Z12.11 COLON CANCER SCREENING: Primary | ICD-10-CM

## 2019-12-20 LAB
APPEARANCE SNV: NORMAL
CAMPYLOBACTER SPP DNA: NOT DETECTED
COLOR UR: NORMAL
CRYPTOSPORIDIUM DNA: NOT DETECTED
ENTAMOEBA HISTOLYTICA: NOT DETECTED
ETEC DNA: NOT DETECTED
GIARDIA LAMBLIA DNA: NOT DETECTED
P. SHIGELLOIDES DNA: NOT DETECTED
SALMONELLA SPP DNA: NOT DETECTED
SHIGA TOXIN GENE: NOT DETECTED
SHIGELLA SPP/EIEC DNA: NOT DETECTED
SOURCE: NORMAL
VIBRIO SPP DNA: NOT DETECTED
Y. ENTEROCOLITICA DNA: NOT DETECTED

## 2020-01-21 ENCOUNTER — OFFICE VISIT (OUTPATIENT)
Dept: SURGERY | Facility: CLINIC | Age: 60
End: 2020-01-21
Payer: MEDICAID

## 2020-01-21 VITALS — HEIGHT: 59 IN | WEIGHT: 140 LBS | BODY MASS INDEX: 28.22 KG/M2

## 2020-01-21 DIAGNOSIS — Z78.9 AT AVERAGE RISK FOR MALIGNANT NEOPLASM OF COLON: Primary | ICD-10-CM

## 2020-01-21 PROCEDURE — 99202 OFFICE O/P NEW SF 15 MIN: CPT | Mod: S$GLB,,, | Performed by: SURGERY

## 2020-01-21 PROCEDURE — 99202 PR OFFICE/OUTPT VISIT, NEW, LEVL II, 15-29 MIN: ICD-10-PCS | Mod: S$GLB,,, | Performed by: SURGERY

## 2020-01-21 NOTE — LETTER
January 21, 2020      Coreen Gamino MD  [address]  [address], [address]  [facility] [address]           [facility]  [address]  [facility] [address]  Phone: [phone]  Fax: [phone]          Patient: Radha Hoffman   MR Number: [id number]   YOB: 1960   Date of Visit: 1/21/2020       Dear Dr. Coreen Gamino:    Thank you for referring Radha Hoffman to me for evaluation. Attached you will find relevant portions of my assessment and plan of care.    If you have questions, please do not hesitate to call me. I look forward to following Radha Hoffman along with you.    Sincerely,    Allan Pritchett MD    Enclosure  CC:  No Recipients    If you would like to receive this communication electronically, please contact [email or web address] or [phone] to request more information on [employer] access.    For providers and/or their staff who would like to refer a patient to [facility], please contact us through our one-stop-shop provider referral line, [facility] [place], at [phone].    If you feel you have received this communication in error or would no longer like to receive these types of communications, please e-mail [email or web address]

## 2020-01-21 NOTE — PROGRESS NOTES
Subjective:       Patient ID: Radha Hoffman is a 59 y.o. female.    Chief Complaint: Colonoscopy    Ms Hoffman is referred by Dr. Coreen Gamino for screening colonoscopy.  She has had 1 screening in the past which was normal. She says she does not recall when or where this procedure was done.  She is asymptomatic.  No family history of colorectal cancer.    Past Medical History:   Diagnosis Date    Allergy     Arthritis     Diabetes mellitus, type 2     GERD (gastroesophageal reflux disease)     Glaucoma     Hypertension     Implantation cyst of iris of both eyes     approx 3-4 years ago Dr Hi      Past Surgical History:   Procedure Laterality Date    CATARACT EXTRACTION, BILATERAL      HYSTERECTOMY       Family History   Problem Relation Age of Onset    Arthritis Mother     Depression Mother     Diabetes Mother     Heart disease Mother     Hypertension Mother     Arthritis Father     Depression Father     Arthritis Sister     Diabetes Sister     Arthritis Brother      Social History     Socioeconomic History    Marital status: Single     Spouse name: Not on file    Number of children: 3    Years of education: Not on file    Highest education level: Not on file   Occupational History    Occupation: nurses aide   Social Needs    Financial resource strain: Not hard at all    Food insecurity:     Worry: Never true     Inability: Never true    Transportation needs:     Medical: No     Non-medical: No   Tobacco Use    Smoking status: Never Smoker   Substance and Sexual Activity    Alcohol use: No     Frequency: Never    Drug use: No    Sexual activity: Never   Lifestyle    Physical activity:     Days per week: 1 day     Minutes per session: 60 min    Stress: Not at all   Relationships    Social connections:     Talks on phone: More than three times a week     Gets together: More than three times a week     Attends Mu-ism service: More than 4 times per year     Active  "member of club or organization: No     Attends meetings of clubs or organizations: Never     Relationship status: Not on file   Other Topics Concern    Not on file   Social History Narrative    Not on file       Current Outpatient Medications   Medication Sig Dispense Refill    ALLERGY RELIEF, LORATADINE, 10 mg tablet TAKE 1 TABLET BY MOUTH ONCE DAILY (Patient not taking: Reported on 12/16/2019) 30 tablet 6    atorvastatin (LIPITOR) 40 MG tablet TAKE 1 TABLET BY MOUTH ONCE DAILY FOR 30 DAYS 30 tablet 5    BD VEO INSULIN SYRINGE UF 1 mL 31 gauge x 15/64" Syrg USE AS DIRECTED 100 Syringe 11    diphenoxylate-atropine 2.5-0.025 mg (LOMOTIL) 2.5-0.025 mg per tablet TAKE 1 TABLET BY MOUTH EVERY 8 HOURS FOR 10 DAYS 30 tablet 2    DULoxetine (CYMBALTA) 30 MG capsule Take 1 capsule (30 mg total) by mouth once daily. 30 capsule 5    exenatide microspheres (BYDUREON) 2 mg/0.65 mL PnIj Inject 2 mg into the skin every 7 days. 4 each 5    fluticasone propionate (FLONASE) 50 mcg/actuation nasal spray USE ONE SPRAY(S) IN EACH NOSTRIL ONCE DAILY FOR 30 DAYS 1 Bottle 3    glimepiride (AMARYL) 2 MG tablet Take 2 tablets by mouth daily 60 tablet 3    HUMALOG U-100 INSULIN 100 unit/mL Crtg INJECT 15 UNITS SUB Q THREE TIMES A DAY WITH EACH MEAL (Patient not taking: Reported on 12/16/2019) 15 Cartridge 5    insulin (LANTUS SOLOSTAR U-100 INSULIN) glargine 100 units/mL (3mL) SubQ pen Inject 50 Units into the skin once daily. 15 mL 3    insulin lispro 100 unit/mL injection Inject into the skin 3 (three) times daily before meals.      loratadine (CLARITIN) 10 mg tablet loratadine 10 mg tablet      losartan (COZAAR) 100 MG tablet TAKE 1 TABLET BY MOUTH ONCE DAILY FOR 30 DAYS 30 tablet 5    metFORMIN (GLUCOPHAGE) 500 MG tablet Take 1 tablet (500 mg total) by mouth daily with breakfast. (Patient not taking: Reported on 12/16/2019) 60 tablet 3    mirtazapine (REMERON) 15 MG tablet       montelukast (SINGULAIR) 10 mg tablet TAKE " "1 TABLET BY MOUTH ONCE DAILY FOR 30 DAYS 30 tablet 5    ONETOUCH ULTRA BLUE TEST STRIP Strp USE  STRIP TO CHECK GLUCOSE THREE TIMES DAILY 100 strip 5    pantoprazole (PROTONIX) 40 MG tablet Take 1 tablet (40 mg total) by mouth once daily. 30 tablet 5    pen needle, diabetic 31 gauge x 5/16" Ndle USE TO GIVE INSULIN ONCE A DAY AT BEDTIME 50 each 11    timolol maleate 0.5% (TIMOPTIC) 0.5 % Drop        No current facility-administered medications for this visit.      Review of patient's allergies indicates:   Allergen Reactions    Metformin     Phenazopyridine     Sulfa (sulfonamide antibiotics)        Review of Systems   All other systems reviewed and are negative.      Objective:      Vitals:    01/21/20 0902   Weight: 63.5 kg (140 lb)   Height: 4' 11" (1.499 m)     Physical Exam   Constitutional: She appears well-developed and well-nourished.   Neck: Normal range of motion. Neck supple.   Cardiovascular: Normal rate, regular rhythm and normal heart sounds.   Pulmonary/Chest: Effort normal and breath sounds normal.   Abdominal: Soft. Bowel sounds are normal. She exhibits no mass.   Musculoskeletal: Normal range of motion. She exhibits no edema or deformity.          Assessment:       Average risk for colorectal cancer  Plan:       Patient will check her work schedule and call back to be scheduled for screening colonoscopy.      "

## 2020-02-10 DIAGNOSIS — E11.65 UNCONTROLLED TYPE 2 DIABETES MELLITUS WITH HYPERGLYCEMIA: ICD-10-CM

## 2020-02-11 RX ORDER — GLIMEPIRIDE 2 MG/1
TABLET ORAL
Qty: 60 TABLET | Refills: 5 | Status: SHIPPED | OUTPATIENT
Start: 2020-02-11 | End: 2020-09-29 | Stop reason: SDUPTHER

## 2020-02-11 RX ORDER — INSULIN GLARGINE 100 [IU]/ML
INJECTION, SOLUTION SUBCUTANEOUS
Qty: 15 ML | Refills: 5 | Status: SHIPPED | OUTPATIENT
Start: 2020-02-11 | End: 2020-02-12

## 2020-02-12 ENCOUNTER — OFFICE VISIT (OUTPATIENT)
Dept: FAMILY MEDICINE | Facility: CLINIC | Age: 60
End: 2020-02-12
Payer: MEDICAID

## 2020-02-12 VITALS
SYSTOLIC BLOOD PRESSURE: 155 MMHG | DIASTOLIC BLOOD PRESSURE: 83 MMHG | BODY MASS INDEX: 28.5 KG/M2 | HEIGHT: 59 IN | HEART RATE: 77 BPM | WEIGHT: 141.38 LBS | OXYGEN SATURATION: 95 % | TEMPERATURE: 98 F

## 2020-02-12 DIAGNOSIS — E11.65 UNCONTROLLED TYPE 2 DIABETES MELLITUS WITH HYPERGLYCEMIA: Primary | ICD-10-CM

## 2020-02-12 DIAGNOSIS — L08.9 INFECTED SEBACEOUS CYST OF SKIN: ICD-10-CM

## 2020-02-12 DIAGNOSIS — L72.3 INFECTED SEBACEOUS CYST OF SKIN: ICD-10-CM

## 2020-02-12 PROCEDURE — 99213 OFFICE O/P EST LOW 20 MIN: CPT | Mod: S$GLB,,, | Performed by: FAMILY MEDICINE

## 2020-02-12 PROCEDURE — 99213 PR OFFICE/OUTPT VISIT, EST, LEVL III, 20-29 MIN: ICD-10-PCS | Mod: S$GLB,,, | Performed by: FAMILY MEDICINE

## 2020-02-12 RX ORDER — AMOXICILLIN AND CLAVULANATE POTASSIUM 875; 125 MG/1; MG/1
875 TABLET, FILM COATED ORAL
COMMUNITY
Start: 2018-07-29 | End: 2020-07-09

## 2020-02-12 RX ORDER — PROMETHAZINE HYDROCHLORIDE AND DEXTROMETHORPHAN HYDROBROMIDE 6.25; 15 MG/5ML; MG/5ML
5 SYRUP ORAL
COMMUNITY
Start: 2018-07-29 | End: 2020-07-09

## 2020-02-12 RX ORDER — CLINDAMYCIN HYDROCHLORIDE 300 MG/1
300 CAPSULE ORAL
COMMUNITY
Start: 2020-02-06 | End: 2020-07-09

## 2020-02-12 RX ORDER — INSULIN GLARGINE 100 [IU]/ML
80 INJECTION, SOLUTION SUBCUTANEOUS DAILY
Qty: 2 BOX | Refills: 5 | Status: SHIPPED | OUTPATIENT
Start: 2020-02-12 | End: 2021-03-26 | Stop reason: SDUPTHER

## 2020-02-12 RX ORDER — CYCLOBENZAPRINE HCL 5 MG
5 TABLET ORAL
COMMUNITY
Start: 2019-04-29 | End: 2020-07-09

## 2020-02-12 RX ORDER — PEN NEEDLE, DIABETIC 30 GX3/16"
NEEDLE, DISPOSABLE MISCELLANEOUS
Qty: 30 EACH | Refills: 5 | Status: SHIPPED | OUTPATIENT
Start: 2020-02-12 | End: 2022-02-23 | Stop reason: SDUPTHER

## 2020-02-12 NOTE — PROGRESS NOTES
"Subjective:      Patient ID: Radha Hoffman is a 60 y.o. female.    Chief Complaint: Follow-up and Diabetes    60-year-old female with history of diabetes and for follow-up on a sebaceous cyst and the neck region.  She states she has always had a small nodule present but recently it became swollen and red so she went to the emergency room.  She reports that the emergency room provider open that up and drained it and gave her antibiotics to take and she is here to follow-up on it.  She bring in a record of her should blood sugar readings and they are ranging consistently in the 200-300's range which is too high.  She states she has been given herself 60 units of Lantus daily in using the Bydureon once weekly.  She does not exercise but has been trying to eat healthy.    Review of Systems   Constitutional: Negative for fever.   HENT: Positive for sore throat. Negative for ear pain, postnasal drip, rhinorrhea and sinus pain.    Eyes: Negative for redness.   Respiratory: Negative for cough, chest tightness, shortness of breath and wheezing.    Cardiovascular: Negative for chest pain, palpitations and leg swelling.   Gastrointestinal: Negative for constipation, diarrhea, nausea and vomiting.   Genitourinary: Negative for difficulty urinating and dysuria.   Musculoskeletal: Negative for arthralgias.   Skin: Negative for rash.        Sebaceous cyst to anterior neck   Neurological: Negative for dizziness.     Medication List with Changes/Refills   New Medications    INSULIN (LANTUS SOLOSTAR U-100 INSULIN) GLARGINE 100 UNITS/ML (3ML) SUBQ PEN    Inject 80 Units into the skin once daily.   Current Medications    ALLERGY RELIEF, LORATADINE, 10 MG TABLET    TAKE 1 TABLET BY MOUTH ONCE DAILY    AMOXICILLIN-CLAVULANATE 875-125MG (AUGMENTIN) 875-125 MG PER TABLET    875 mg.    ATORVASTATIN (LIPITOR) 40 MG TABLET    TAKE 1 TABLET BY MOUTH ONCE DAILY FOR 30 DAYS    BD VEO INSULIN SYRINGE UF 1 ML 31 GAUGE X 15/64" SYRG    USE AS " "DIRECTED    CLINDAMYCIN (CLEOCIN) 300 MG CAPSULE    300 mg.    CYCLOBENZAPRINE (FLEXERIL) 5 MG TABLET    5 mg.    DIPHENOXYLATE-ATROPINE 2.5-0.025 MG (LOMOTIL) 2.5-0.025 MG PER TABLET    TAKE 1 TABLET BY MOUTH EVERY 8 HOURS FOR 10 DAYS    DULOXETINE (CYMBALTA) 30 MG CAPSULE    Take 1 capsule (30 mg total) by mouth once daily.    FLUTICASONE PROPIONATE (FLONASE) 50 MCG/ACTUATION NASAL SPRAY    USE ONE SPRAY(S) IN EACH NOSTRIL ONCE DAILY FOR 30 DAYS    GLIMEPIRIDE (AMARYL) 2 MG TABLET    TAKE 2 TABLETS BY MOUTH ONCE DAILY    HUMALOG U-100 INSULIN 100 UNIT/ML CRTG    INJECT 15 UNITS SUB Q THREE TIMES A DAY WITH EACH MEAL    INSULIN LISPRO 100 UNIT/ML INJECTION    Inject into the skin 3 (three) times daily before meals.    LORATADINE (CLARITIN) 10 MG TABLET    loratadine 10 mg tablet    LOSARTAN (COZAAR) 100 MG TABLET    TAKE 1 TABLET BY MOUTH ONCE DAILY FOR 30 DAYS    METFORMIN (GLUCOPHAGE) 500 MG TABLET    Take 1 tablet (500 mg total) by mouth daily with breakfast.    MIRTAZAPINE (REMERON) 15 MG TABLET        MONTELUKAST (SINGULAIR) 10 MG TABLET    TAKE 1 TABLET BY MOUTH ONCE DAILY FOR 30 DAYS    ONETOUCH ULTRA BLUE TEST STRIP STRP    USE  STRIP TO CHECK GLUCOSE THREE TIMES DAILY    PANTOPRAZOLE (PROTONIX) 40 MG TABLET    Take 1 tablet (40 mg total) by mouth once daily.    PEN NEEDLE, DIABETIC (BD ULTRA-FINE SHORT PEN NEEDLE) 31 GAUGE X 5/16" NDLE    Use to give insulin once a day at bedtime    PROMETHAZINE-DEXTROMETHORPHAN (PROMETHAZINE-DM) 6.25-15 MG/5 ML SYRP    5 mLs.    TIMOLOL MALEATE 0.5% (TIMOPTIC) 0.5 % DROP       Discontinued Medications    EXENATIDE MICROSPHERES (BYDUREON) 2 MG/0.65 ML PNIJ    Inject 2 mg into the skin every 7 days.    LANTUS SOLOSTAR U-100 INSULIN GLARGINE 100 UNITS/ML (3ML) SUBQ PEN    INJECT 50 UNITS SUBCUTANEOUSLY ONCE DAILY      Objective:   BP (!) 155/83 (BP Location: Left arm, Patient Position: Sitting, BP Method: Medium (Automatic))   Pulse 77   Temp 97.9 °F (36.6 °C)   Ht 4' 11" " "(1.499 m)   Wt 64.1 kg (141 lb 6 oz)   SpO2 95%   BMI 28.55 kg/m²    Estimated body mass index is 28.55 kg/m² as calculated from the following:    Height as of this encounter: 4' 11" (1.499 m).    Weight as of this encounter: 64.1 kg (141 lb 6 oz).   Physical Exam   Constitutional: She is oriented to person, place, and time. She appears well-developed and well-nourished.   Neck: Normal range of motion. Neck supple.   Pulmonary/Chest: Effort normal and breath sounds normal.   Abdominal: Soft. Bowel sounds are normal.   Neurological: She is alert and oriented to person, place, and time.   Skin:   Cyst to anterior neck is without erythema and no active drainage.     Lab Results   Component Value Date    WBC 6.52 12/09/2019    HGB 13.0 12/09/2019    HCT 39.5 12/09/2019     12/09/2019    CHOL 102 12/09/2019    TRIG 147 12/09/2019    HDL 31 (L) 12/09/2019    ALT 30 09/09/2019    AST 21 12/09/2019     12/09/2019    K 5.1 12/09/2019     12/09/2019    CREATININE 0.80 12/09/2019    BUN 6.4 (L) 12/09/2019    CO2 31 (H) 12/09/2019    TSH 0.52 12/09/2019    HGBA1C 11.6 (H) 12/09/2019      Assessment:      Problem List Items Addressed This Visit        Derm    Infected sebaceous cyst of skin       Endocrine    Uncontrolled type 2 diabetes mellitus with hyperglycemia - Primary    Relevant Medications    insulin (LANTUS SOLOSTAR U-100 INSULIN) glargine 100 units/mL (3mL) SubQ pen           Plan:   Explained to patient that it is imperative that she get her diabetes under better control so I will have her to increase the Lantus to 80 units daily and she will continue the Bydureon and she has a follow-up appointment in 2-3 weeks and we will recheck at that time.  This cyst on her neck is responding to antibiotics and on the way to get rid of it is to surgically remove however I would not recommend any further treatment to we can get her hemoglobin A1c more closer to goal.       "

## 2020-03-16 ENCOUNTER — OFFICE VISIT (OUTPATIENT)
Dept: FAMILY MEDICINE | Facility: CLINIC | Age: 60
End: 2020-03-16
Payer: MEDICAID

## 2020-03-16 VITALS
BODY MASS INDEX: 27.67 KG/M2 | HEIGHT: 59 IN | SYSTOLIC BLOOD PRESSURE: 157 MMHG | OXYGEN SATURATION: 98 % | TEMPERATURE: 97 F | WEIGHT: 137.25 LBS | DIASTOLIC BLOOD PRESSURE: 74 MMHG | HEART RATE: 73 BPM

## 2020-03-16 DIAGNOSIS — R27.8 POOR MANUAL DEXTERITY: ICD-10-CM

## 2020-03-16 DIAGNOSIS — F40.298 FEAR OF NEEDLES: ICD-10-CM

## 2020-03-16 DIAGNOSIS — E11.65 UNCONTROLLED TYPE 2 DIABETES MELLITUS WITH HYPERGLYCEMIA: Primary | ICD-10-CM

## 2020-03-16 PROCEDURE — 99213 PR OFFICE/OUTPT VISIT, EST, LEVL III, 20-29 MIN: ICD-10-PCS | Mod: S$GLB,,, | Performed by: FAMILY MEDICINE

## 2020-03-16 PROCEDURE — 99213 OFFICE O/P EST LOW 20 MIN: CPT | Mod: S$GLB,,, | Performed by: FAMILY MEDICINE

## 2020-03-16 NOTE — PROGRESS NOTES
Clinic Note  3/16/2020      Subjective:       Patient ID:  Manuela is a 60 y.o. female being seen for an established visit.    Chief Complaint: Follow-up and Diabetes    HPI   Radha Hoffman is an 60 y.o. female who presents for follow up of diabetes. Current symptoms include: none. Patient denies nausea, paresthesia of the feet, polydipsia, polyuria and visual disturbances. Evaluation to date has included: hemoglobin A1C. Home sugars: BGs range between 200 and 300. Current treatments: Last visit lantus insulin was increaed, which has been not very effective. Last dilated eye exam 2020. States she is not taking bydureon. However per PCP last visit note she is to be taking bydureon.      The following portions of the patient's history were reviewed and updated as appropriate: allergies, current medications, past family history, past medical history, past social history, past surgical history and problem list.    Review of Systems   Constitutional: Negative for chills, fever, malaise/fatigue and weight loss.   HENT: Negative for congestion and sore throat.    Eyes: Negative for blurred vision, double vision, photophobia and pain.   Respiratory: Negative for cough, sputum production, shortness of breath and wheezing.    Cardiovascular: Negative for chest pain, palpitations, claudication and leg swelling.   Gastrointestinal: Negative for constipation, diarrhea, nausea and vomiting.   Genitourinary: Negative for dysuria, frequency, hematuria and urgency.   Skin:        Sebaceous cyst to anterior neck        Medication List with Changes/Refills   New Medications    EXENATIDE MICROSPHERES (BYDUREON BCISE) 2 MG/0.85 ML ATIN    Inject 2 mg into the skin every 7 days.   Current Medications    ALLERGY RELIEF, LORATADINE, 10 MG TABLET    TAKE 1 TABLET BY MOUTH ONCE DAILY    AMOXICILLIN-CLAVULANATE 875-125MG (AUGMENTIN) 875-125 MG PER TABLET    875 mg.    ATORVASTATIN (LIPITOR) 40 MG TABLET    TAKE 1 TABLET BY MOUTH ONCE DAILY  "FOR 30 DAYS    BD VEO INSULIN SYRINGE UF 1 ML 31 GAUGE X 15/64" SYRG    USE AS DIRECTED    CLINDAMYCIN (CLEOCIN) 300 MG CAPSULE    300 mg.    CYCLOBENZAPRINE (FLEXERIL) 5 MG TABLET    5 mg.    DIPHENOXYLATE-ATROPINE 2.5-0.025 MG (LOMOTIL) 2.5-0.025 MG PER TABLET    TAKE 1 TABLET BY MOUTH EVERY 8 HOURS FOR 10 DAYS    DULOXETINE (CYMBALTA) 30 MG CAPSULE    Take 1 capsule (30 mg total) by mouth once daily.    FLUTICASONE PROPIONATE (FLONASE) 50 MCG/ACTUATION NASAL SPRAY    USE ONE SPRAY(S) IN EACH NOSTRIL ONCE DAILY FOR 30 DAYS    GLIMEPIRIDE (AMARYL) 2 MG TABLET    TAKE 2 TABLETS BY MOUTH ONCE DAILY    INSULIN (LANTUS SOLOSTAR U-100 INSULIN) GLARGINE 100 UNITS/ML (3ML) SUBQ PEN    Inject 80 Units into the skin once daily.    LORATADINE (CLARITIN) 10 MG TABLET    loratadine 10 mg tablet    LOSARTAN (COZAAR) 100 MG TABLET    TAKE 1 TABLET BY MOUTH ONCE DAILY FOR 30 DAYS    METFORMIN (GLUCOPHAGE) 500 MG TABLET    Take 1 tablet (500 mg total) by mouth daily with breakfast.    MIRTAZAPINE (REMERON) 15 MG TABLET        MONTELUKAST (SINGULAIR) 10 MG TABLET    TAKE 1 TABLET BY MOUTH ONCE DAILY FOR 30 DAYS    ONETOUCH ULTRA BLUE TEST STRIP STRP    USE  STRIP TO CHECK GLUCOSE THREE TIMES DAILY    PANTOPRAZOLE (PROTONIX) 40 MG TABLET    Take 1 tablet (40 mg total) by mouth once daily.    PEN NEEDLE, DIABETIC (BD ULTRA-FINE SHORT PEN NEEDLE) 31 GAUGE X 5/16" NDLE    Use to give insulin once a day at bedtime    PROMETHAZINE-DEXTROMETHORPHAN (PROMETHAZINE-DM) 6.25-15 MG/5 ML SYRP    5 mLs.    TIMOLOL MALEATE 0.5% (TIMOPTIC) 0.5 % DROP       Discontinued Medications    HUMALOG U-100 INSULIN 100 UNIT/ML CRTG    INJECT 15 UNITS SUB Q THREE TIMES A DAY WITH EACH MEAL    INSULIN LISPRO 100 UNIT/ML INJECTION    Inject into the skin 3 (three) times daily before meals.       Patient Active Problem List   Diagnosis    Uncontrolled type 2 diabetes mellitus with hyperglycemia    Essential hypertension    Mixed hyperlipidemia    Diarrhea " "   GERD without esophagitis    Infected sebaceous cyst of skin           Objective:      BP (!) 157/74 (BP Location: Left arm, Patient Position: Sitting, BP Method: Medium (Automatic))   Pulse 73   Temp 97 °F (36.1 °C)   Ht 4' 11" (1.499 m)   Wt 62.3 kg (137 lb 4 oz)   SpO2 98%   BMI 27.72 kg/m²   Estimated body mass index is 27.72 kg/m² as calculated from the following:    Height as of this encounter: 4' 11" (1.499 m).    Weight as of this encounter: 62.3 kg (137 lb 4 oz).  Physical Exam   Constitutional: She is oriented to person, place, and time and well-developed, well-nourished, and in no distress. She appears healthy. She does not have a sickly appearance. No distress.   Neck: Normal range of motion. No JVD present. No tracheal deviation present.   Cardiovascular: Normal rate, normal heart sounds and intact distal pulses.   Pulmonary/Chest: Effort normal and breath sounds normal. No respiratory distress. She has no wheezes.   Neurological: She is alert and oriented to person, place, and time. Gait normal. GCS score is 15.   Skin: Skin is warm, dry and intact. She is not diaphoretic.        Psychiatric: Mood, memory, affect and judgment normal.   Nursing note and vitals reviewed.        Assessment and Plan:   Uncontrolled type 2 diabetes, chronic  Continue lantus, metformin, glymiperide as previously prescribed  Bydureon as prescribed  Continue to monitor and record BS  Follow up in 4 weeks      Problem List Items Addressed This Visit        Endocrine    Uncontrolled type 2 diabetes mellitus with hyperglycemia - Primary    Relevant Medications    exenatide microspheres (BYDUREON BCISE) 2 mg/0.85 mL AtIn            Risks, benefits, and alternatives discussed with patient, Patient verbalized understanding of discussed plan of care. Asked patient if any further questions, answered no.  Follow Up:   Follow up in about 4 weeks (around 4/13/2020), or if symptoms worsen or fail to improve.    Other Orders " Placed This Visit:  No orders of the defined types were placed in this encounter.        Inge Mcleod    Problem List Items Addressed This Visit        Endocrine    Uncontrolled type 2 diabetes mellitus with hyperglycemia - Primary    Relevant Medications    exenatide microspheres (BYDUREON BCISE) 2 mg/0.85 mL AtIn

## 2020-04-02 RX ORDER — DIPHENOXYLATE HYDROCHLORIDE AND ATROPINE SULFATE 2.5; .025 MG/1; MG/1
TABLET ORAL
Qty: 30 TABLET | Refills: 2 | Status: SHIPPED | OUTPATIENT
Start: 2020-04-02 | End: 2020-07-09

## 2020-05-06 ENCOUNTER — PATIENT OUTREACH (OUTPATIENT)
Dept: ADMINISTRATIVE | Facility: HOSPITAL | Age: 60
End: 2020-05-06

## 2020-05-11 RX ORDER — ATORVASTATIN CALCIUM 40 MG/1
TABLET, FILM COATED ORAL
Qty: 30 TABLET | Refills: 3 | Status: SHIPPED | OUTPATIENT
Start: 2020-05-11 | End: 2020-09-25

## 2020-07-09 ENCOUNTER — OFFICE VISIT (OUTPATIENT)
Dept: INTERNAL MEDICINE | Facility: CLINIC | Age: 60
End: 2020-07-09
Payer: MEDICAID

## 2020-07-09 ENCOUNTER — TELEPHONE (OUTPATIENT)
Dept: INTERNAL MEDICINE | Facility: CLINIC | Age: 60
End: 2020-07-09

## 2020-07-09 VITALS
WEIGHT: 130 LBS | DIASTOLIC BLOOD PRESSURE: 73 MMHG | OXYGEN SATURATION: 96 % | HEIGHT: 59 IN | TEMPERATURE: 98 F | SYSTOLIC BLOOD PRESSURE: 145 MMHG | HEART RATE: 73 BPM | BODY MASS INDEX: 26.21 KG/M2 | RESPIRATION RATE: 16 BRPM

## 2020-07-09 DIAGNOSIS — Z12.31 SCREENING MAMMOGRAM, ENCOUNTER FOR: ICD-10-CM

## 2020-07-09 DIAGNOSIS — I10 ESSENTIAL HYPERTENSION: ICD-10-CM

## 2020-07-09 DIAGNOSIS — E11.65 UNCONTROLLED TYPE 2 DIABETES MELLITUS WITH HYPERGLYCEMIA: Primary | ICD-10-CM

## 2020-07-09 DIAGNOSIS — Z11.4 ENCOUNTER FOR SCREENING FOR HIV: ICD-10-CM

## 2020-07-09 DIAGNOSIS — J30.9 ALLERGIC RHINITIS, UNSPECIFIED SEASONALITY, UNSPECIFIED TRIGGER: ICD-10-CM

## 2020-07-09 DIAGNOSIS — R11.2 NAUSEA AND VOMITING, INTRACTABILITY OF VOMITING NOT SPECIFIED, UNSPECIFIED VOMITING TYPE: ICD-10-CM

## 2020-07-09 DIAGNOSIS — Z11.59 NEED FOR HEPATITIS C SCREENING TEST: ICD-10-CM

## 2020-07-09 PROCEDURE — 99215 PR OFFICE/OUTPT VISIT, EST, LEVL V, 40-54 MIN: ICD-10-PCS | Mod: S$GLB,,, | Performed by: INTERNAL MEDICINE

## 2020-07-09 PROCEDURE — 99215 OFFICE O/P EST HI 40 MIN: CPT | Mod: S$GLB,,, | Performed by: INTERNAL MEDICINE

## 2020-07-09 RX ORDER — MONTELUKAST SODIUM 10 MG/1
TABLET ORAL
Qty: 90 TABLET | Refills: 3 | Status: SHIPPED | OUTPATIENT
Start: 2020-07-09 | End: 2022-02-24 | Stop reason: SDUPTHER

## 2020-07-09 RX ORDER — LOSARTAN POTASSIUM 100 MG/1
TABLET ORAL
Qty: 90 TABLET | Refills: 3 | Status: SHIPPED | OUTPATIENT
Start: 2020-07-09 | End: 2021-03-19

## 2020-07-09 NOTE — TELEPHONE ENCOUNTER
----- Message from Hoang Corbett sent at 7/9/2020  4:21 PM CDT -----  Regarding: Appt for 07/10/2020 per Dr. Mathew  Please call [facility] to schedule an appt for 07/10/2020. The patient said Dr. Mathew told them to call and get an appt for that date [phone] (home).

## 2020-07-09 NOTE — PROGRESS NOTES
Subjective:      Patient ID: Radha Hoffman is a 60 y.o. female.    Chief Complaint: Emesis (vomiting.... 2 nights ago), Nausea, and Abdominal Pain    HPI: Patient with h/o DM with last A1c- 11.2. Patient reports BS at home are fluctuating from 100-300. Patient reports polyuria, polydipsia, No numbness in hands or feet. Patient is taking Lantus 80 units daily + Bydureon. Patient is being followed by Ophthalmologist but not podiatrist.     Patient BP are not being monitored at home.     Patient reports having nausea x 1 month, intermittent, almost every other day, patient is vomiting as well and has clear fluid or food in the vomitus. Patient denies any epigastric pain, no early satiety. Patient complains of left lower quadrant pain x 3 weeks. Pain is intermittent, severe, 7/10 in intensity, sharp, no radiation of pain, no exacerbating or releiving factor known. Patient denies any burning in urine, hematuria, has urinary frequency. Patient also complains of diarrhea x 3 weeks, no blood in stool or black colored stool.     Review of Systems   Constitutional: Negative for chills, diaphoresis, fever, malaise/fatigue and weight loss.   HENT: Negative for congestion, ear pain, sinus pain, sore throat and tinnitus.    Eyes: Negative for blurred vision and photophobia.   Respiratory: Negative for cough, hemoptysis, shortness of breath and wheezing.    Cardiovascular: Negative for chest pain, palpitations, orthopnea, leg swelling and PND.   Gastrointestinal: Positive for abdominal pain, diarrhea, nausea and vomiting. Negative for blood in stool, constipation, heartburn and melena.   Genitourinary: Negative for dysuria, frequency and urgency.   Musculoskeletal: Negative for back pain, myalgias and neck pain.   Skin: Negative for rash.   Neurological: Negative for dizziness, tremors, seizures, loss of consciousness and weakness.   Endo/Heme/Allergies: Negative for polydipsia.   Psychiatric/Behavioral: Negative for  "depression and hallucinations. The patient does not have insomnia.      Objective:     Physical Exam  Constitutional:       General: She is not in acute distress.     Appearance: She is not diaphoretic.   Neck:      Thyroid: No thyromegaly.   Cardiovascular:      Rate and Rhythm: Normal rate and regular rhythm.      Heart sounds: Normal heart sounds. No murmur.   Pulmonary:      Effort: Pulmonary effort is normal. No respiratory distress.      Breath sounds: Normal breath sounds. No wheezing.   Abdominal:      General: Bowel sounds are normal. There is no distension.      Palpations: Abdomen is soft.      Tenderness: There is abdominal tenderness (Patient complains of left lower quadrant tenderness but when distracted there is no tenderness.).   Lymphadenopathy:      Cervical: No cervical adenopathy.   Neurological:      Mental Status: She is alert and oriented to person, place, and time.   Psychiatric:         Behavior: Behavior normal.         Thought Content: Thought content normal.         Judgment: Judgment normal.       Assessment:       ICD-10-CM ICD-9-CM   1. Uncontrolled type 2 diabetes mellitus with hyperglycemia  E11.65 250.02   2. Essential hypertension  I10 401.9   3. Nausea and vomiting, intractability of vomiting not specified, unspecified vomiting type  R11.2 787.01       Plan:   Patient with poorly controlled blood sugar.  On arrival [facility] blood sugar was reading "HI"  Patient is given 10 units of Humalog and blood sugar is checked after 30 min.   Patient blood sugar is still reading "HI"  Talked to ER physician at [facility] and transfer patient for further care.  Patient probably will need IV fluid and more insulin to bring blood sugars down.  Patient has left lower quadrant pain that can be because of UTI.  Further workup is needed  Patient complains of abdominal pain but examination is unremarkable.  Patient has nausea and vomiting that can be secondary to dehydration due to " "high blood sugar  Once hydrated the symptoms may improve  Will evaluate patient in a day or 2 again for further treatment  Patient is advised to increase Lantus to 42 units twice a day.   Patient to call my office after discharge from ER for appointment.   Medication List with Changes/Refills   Current Medications    ATORVASTATIN (LIPITOR) 40 MG TABLET    TAKE 1 TABLET BY MOUTH ONCE DAILY FOR 30 DAYS    BD VEO INSULIN SYRINGE UF 1 ML 31 GAUGE X 15/64" SYRG    USE AS DIRECTED    DULOXETINE (CYMBALTA) 30 MG CAPSULE    Take 1 capsule (30 mg total) by mouth once daily.    EXENATIDE MICROSPHERES (BYDUREON BCISE) 2 MG/0.85 ML ATIN    Inject 2 mg into the skin every 7 days.    GLIMEPIRIDE (AMARYL) 2 MG TABLET    TAKE 2 TABLETS BY MOUTH ONCE DAILY    INSULIN (LANTUS SOLOSTAR U-100 INSULIN) GLARGINE 100 UNITS/ML (3ML) SUBQ PEN    Inject 80 Units into the skin once daily.    LOSARTAN (COZAAR) 100 MG TABLET    TAKE 1 TABLET BY MOUTH ONCE DAILY FOR 30 DAYS    MIRTAZAPINE (REMERON) 15 MG TABLET    Take 15 mg by mouth every evening.     MONTELUKAST (SINGULAIR) 10 MG TABLET    TAKE 1 TABLET BY MOUTH ONCE DAILY FOR 30 DAYS    ONETOUCH ULTRA BLUE TEST STRIP STRP    USE  STRIP TO CHECK GLUCOSE THREE TIMES DAILY    PEN NEEDLE, DIABETIC (BD ULTRA-FINE SHORT PEN NEEDLE) 31 GAUGE X 5/16" NDLE    Use to give insulin once a day at bedtime   Discontinued Medications    ALLERGY RELIEF, LORATADINE, 10 MG TABLET    TAKE 1 TABLET BY MOUTH ONCE DAILY    AMOXICILLIN-CLAVULANATE 875-125MG (AUGMENTIN) 875-125 MG PER TABLET    875 mg.    CLINDAMYCIN (CLEOCIN) 300 MG CAPSULE    300 mg.    CYCLOBENZAPRINE (FLEXERIL) 5 MG TABLET    5 mg.    DIPHENOXYLATE-ATROPINE 2.5-0.025 MG (LOMOTIL) 2.5-0.025 MG PER TABLET    TAKE 1 TABLET BY MOUTH EVERY 8 HOURS FOR 10 DAYS    FLUTICASONE PROPIONATE (FLONASE) 50 MCG/ACTUATION NASAL SPRAY    USE ONE SPRAY(S) IN EACH NOSTRIL ONCE DAILY FOR 30 DAYS    LORATADINE (CLARITIN) 10 MG TABLET    loratadine 10 mg tablet    " METFORMIN (GLUCOPHAGE) 500 MG TABLET    Take 1 tablet (500 mg total) by mouth daily with breakfast.    PANTOPRAZOLE (PROTONIX) 40 MG TABLET    Take 1 tablet (40 mg total) by mouth once daily.    PROMETHAZINE-DEXTROMETHORPHAN (PROMETHAZINE-DM) 6.25-15 MG/5 ML SYRP    5 mLs.    TIMOLOL MALEATE 0.5% (TIMOPTIC) 0.5 % DROP

## 2020-07-10 ENCOUNTER — OFFICE VISIT (OUTPATIENT)
Dept: INTERNAL MEDICINE | Facility: CLINIC | Age: 60
End: 2020-07-10
Payer: MEDICAID

## 2020-07-10 VITALS
HEART RATE: 81 BPM | SYSTOLIC BLOOD PRESSURE: 140 MMHG | OXYGEN SATURATION: 99 % | HEIGHT: 59 IN | TEMPERATURE: 97 F | BODY MASS INDEX: 26.21 KG/M2 | DIASTOLIC BLOOD PRESSURE: 83 MMHG | WEIGHT: 130 LBS

## 2020-07-10 DIAGNOSIS — Z23 NEED FOR SHINGLES VACCINE: ICD-10-CM

## 2020-07-10 DIAGNOSIS — I10 ESSENTIAL HYPERTENSION: ICD-10-CM

## 2020-07-10 DIAGNOSIS — E11.65 UNCONTROLLED TYPE 2 DIABETES MELLITUS WITH HYPERGLYCEMIA: Primary | ICD-10-CM

## 2020-07-10 DIAGNOSIS — F33.41 RECURRENT MAJOR DEPRESSIVE DISORDER, IN PARTIAL REMISSION: ICD-10-CM

## 2020-07-10 PROCEDURE — 99214 PR OFFICE/OUTPT VISIT, EST, LEVL IV, 30-39 MIN: ICD-10-PCS | Mod: S$GLB,,, | Performed by: INTERNAL MEDICINE

## 2020-07-10 PROCEDURE — 99214 OFFICE O/P EST MOD 30 MIN: CPT | Mod: S$GLB,,, | Performed by: INTERNAL MEDICINE

## 2020-07-10 RX ORDER — DULOXETIN HYDROCHLORIDE 60 MG/1
60 CAPSULE, DELAYED RELEASE ORAL DAILY
Qty: 30 CAPSULE | Refills: 11 | Status: SHIPPED | OUTPATIENT
Start: 2020-07-10 | End: 2021-03-19

## 2020-07-10 RX ORDER — ONDANSETRON 4 MG/1
4 TABLET, ORALLY DISINTEGRATING ORAL
COMMUNITY
Start: 2020-07-09 | End: 2021-03-19

## 2020-07-10 RX ORDER — CEFDINIR 300 MG/1
300 CAPSULE ORAL
COMMUNITY
Start: 2020-07-09 | End: 2020-07-16

## 2020-07-10 RX ORDER — ZOSTER VACCINE RECOMBINANT, ADJUVANTED 50 MCG/0.5
KIT INTRAMUSCULAR
Qty: 0.5 ML | Refills: 0 | Status: SHIPPED | OUTPATIENT
Start: 2020-07-10 | End: 2021-03-19

## 2020-07-10 NOTE — PROGRESS NOTES
Subjective:      Patient ID: Radha Hoffman is a 60 y.o. female.    Chief Complaint: Follow-up and Diabetes     Patient with h/o DM with last A1c- 11.2. Patient reports BS at home are fluctuating from 100-300. Patient reports polyuria, polydipsia, No numbness in hands or feet. Patient is taking Lantus 80 units daily + Bydureon. Patient is being followed by Ophthalmologist but not podiatrist. Patient was evaluated yesterday with really high BS and was sent to ER where her BS were improved with Insulin. Patient was also found to have UTI and that can be a contributing factor as well. Patient was discharged with Omnicef. Patient is not Adherent to diet and used Ornage juice in the  morning.  Patient BS yesterday were 549, 323, 410.    Patient BP are not being monitored at home.     Patient reports having nausea x 1 month, intermittent, almost every other day, patient is vomiting as well and has clear fluid or food in the vomitus. Patient denies any epigastric pain, no early satiety. Patient complains of left lower quadrant pain x 3 weeks. Pain is intermittent, severe, 7/10 in intensity, sharp, no radiation of pain, no exacerbating or releiving factor known. Patient denies any burning in urine, hematuria, has urinary frequency. Patient also complains of diarrhea x 3 weeks, no blood in stool or black colored stool.  Patient reports that lower abdominal pain is still there. Patient is still having Nausea and Vomiting. And had an episode yesterday.     Review of Systems   Constitutional: Negative for chills, diaphoresis, fever, malaise/fatigue and weight loss.   HENT: Negative for congestion, ear pain, sinus pain, sore throat and tinnitus.    Eyes: Negative for blurred vision and photophobia.   Respiratory: Negative for cough, hemoptysis, shortness of breath and wheezing.    Cardiovascular: Negative for chest pain, palpitations, orthopnea, leg swelling and PND.   Gastrointestinal: Positive for abdominal pain, diarrhea,  nausea and vomiting. Negative for blood in stool, constipation, heartburn and melena.   Genitourinary: Negative for dysuria, frequency and urgency.   Musculoskeletal: Negative for back pain, myalgias and neck pain.   Skin: Negative for rash.   Neurological: Negative for dizziness, tremors, seizures, loss of consciousness and weakness.   Endo/Heme/Allergies: Negative for polydipsia.   Psychiatric/Behavioral: Negative for depression and hallucinations. The patient does not have insomnia.      Objective:     Physical Exam  Constitutional:       General: She is not in acute distress.     Appearance: She is not diaphoretic.   Neck:      Thyroid: No thyromegaly.   Cardiovascular:      Rate and Rhythm: Normal rate and regular rhythm.      Heart sounds: Normal heart sounds. No murmur.   Pulmonary:      Effort: Pulmonary effort is normal. No respiratory distress.      Breath sounds: Normal breath sounds. No wheezing.   Abdominal:      General: Bowel sounds are normal. There is no distension.      Palpations: Abdomen is soft.      Tenderness: There is abdominal tenderness (Patient complains of left lower quadrant tenderness but when distracted there is no tenderness.).   Lymphadenopathy:      Cervical: No cervical adenopathy.   Neurological:      Mental Status: She is alert and oriented to person, place, and time.   Psychiatric:         Behavior: Behavior normal.         Thought Content: Thought content normal.         Judgment: Judgment normal.       Assessment:       ICD-10-CM ICD-9-CM   1. Uncontrolled type 2 diabetes mellitus with hyperglycemia  E11.65 250.02   2. Essential hypertension  I10 401.9   3. Recurrent major depressive disorder, in partial remission  F33.41 296.35       Plan:   Patient blood sugars are very poorly controlled.  Will increase Lantus from 80 units daily to 44 units twice a day.  Will also give patient insulin sliding scale.  Patient is taking Bydureon will continue medication.  Patient has poor  "understanding of disease process + diet.   Patient consumes large amount of orange juice..  Will refer to diabetic education  Patient blood pressures are running high.  But patient was recently admitted with hyperglycemia and was given IV fluid.  Will not change medication at this time  Repeat labs are ordered advised patient to get labs done.  Patient has depression and that can be contributing to patient body symptoms.  Patient depression does not seem under control.  Will increase Cymbalta to 60 mg.  Patient reports some fatigue will stop mirtazapine.    Medication List with Changes/Refills   Current Medications    ATORVASTATIN (LIPITOR) 40 MG TABLET    TAKE 1 TABLET BY MOUTH ONCE DAILY FOR 30 DAYS    BD VEO INSULIN SYRINGE UF 1 ML 31 GAUGE X 15/64" SYRG    USE AS DIRECTED    CEFDINIR (OMNICEF) 300 MG CAPSULE    300 mg.    EXENATIDE MICROSPHERES (BYDUREON BCISE) 2 MG/0.85 ML ATIN    Inject 2 mg into the skin every 7 days.    GLIMEPIRIDE (AMARYL) 2 MG TABLET    TAKE 2 TABLETS BY MOUTH ONCE DAILY    INSULIN (LANTUS SOLOSTAR U-100 INSULIN) GLARGINE 100 UNITS/ML (3ML) SUBQ PEN    Inject 80 Units into the skin once daily.    LOSARTAN (COZAAR) 100 MG TABLET    TAKE 1 TABLET BY MOUTH ONCE DAILY FOR 30 DAYS    MONTELUKAST (SINGULAIR) 10 MG TABLET    TAKE 1 TABLET BY MOUTH ONCE DAILY FOR 30 DAYS    ONDANSETRON (ZOFRAN-ODT) 4 MG TBDL    4 mg.    ONETOUCH ULTRA BLUE TEST STRIP STRP    USE  STRIP TO CHECK GLUCOSE THREE TIMES DAILY    PEN NEEDLE, DIABETIC (BD ULTRA-FINE SHORT PEN NEEDLE) 31 GAUGE X 5/16" NDLE    Use to give insulin once a day at bedtime   Discontinued Medications    DULOXETINE (CYMBALTA) 30 MG CAPSULE    Take 1 capsule (30 mg total) by mouth once daily.    MIRTAZAPINE (REMERON) 15 MG TABLET    Take 15 mg by mouth every evening.           "

## 2020-07-16 ENCOUNTER — OFFICE VISIT (OUTPATIENT)
Dept: INTERNAL MEDICINE | Facility: CLINIC | Age: 60
End: 2020-07-16
Payer: MEDICAID

## 2020-07-16 VITALS
DIASTOLIC BLOOD PRESSURE: 75 MMHG | TEMPERATURE: 97 F | SYSTOLIC BLOOD PRESSURE: 151 MMHG | HEART RATE: 75 BPM | WEIGHT: 133.25 LBS | OXYGEN SATURATION: 99 % | BODY MASS INDEX: 26.86 KG/M2 | HEIGHT: 59 IN

## 2020-07-16 DIAGNOSIS — E11.65 UNCONTROLLED TYPE 2 DIABETES MELLITUS WITH HYPERGLYCEMIA: Primary | ICD-10-CM

## 2020-07-16 DIAGNOSIS — I10 ESSENTIAL HYPERTENSION: ICD-10-CM

## 2020-07-16 LAB
ABS NRBC COUNT: 0 X 10 3/UL (ref 0–0.01)
ABSOLUTE BASOPHIL: 0.02 X 10 3/UL (ref 0–0.22)
ABSOLUTE EOSINOPHIL: 0.19 X 10 3/UL (ref 0.04–0.54)
ABSOLUTE IMMATURE GRAN: 0.01 X 10 3/UL (ref 0–0.04)
ABSOLUTE LYMPHOCYTE: 2.42 X 10 3/UL (ref 0.86–4.75)
ABSOLUTE MONOCYTE: 0.48 X 10 3/UL (ref 0.22–1.08)
ALBUMIN SERPL-MCNC: 4.1 G/DL (ref 3.5–5.2)
ALBUMIN/GLOB SERPL ELPH: 1.2 {RATIO} (ref 1–2.7)
ALP ISOS SERPL LEV INH-CCNC: 87 U/L (ref 35–105)
ALT (SGPT): 20 U/L (ref 0–33)
ANION GAP SERPL CALC-SCNC: 8 MMOL/L (ref 8–17)
AST SERPL-CCNC: 18 U/L (ref 0–32)
BASOPHILS NFR BLD: 0.3 % (ref 0.2–1.2)
BILIRUBIN, TOTAL: 0.34 MG/DL (ref 0–1.2)
BUN/CREAT SERPL: 8.2 (ref 6–20)
CALCIUM SERPL-MCNC: 10.1 MG/DL (ref 8.6–10.2)
CARBON DIOXIDE, CO2: 28 MMOL/L (ref 22–29)
CHLORIDE: 105 MMOL/L (ref 98–107)
CHOLEST SERPL-MSCNC: 135 MG/DL (ref 100–200)
CREAT SERPL-MCNC: 0.79 MG/DL (ref 0.5–0.9)
CREATININE RANDOM URINE: 65.8 MG/DL (ref 28–217)
EOSINOPHIL NFR BLD: 3.1 % (ref 0.7–7)
ESTIMATED AVERAGE GLUCOSE: 349 MG/DL
GFR ESTIMATION: 73.74
GLOBULIN: 3.3 G/DL (ref 1.5–4.5)
GLUCOSE: 141 MG/DL (ref 82–115)
HBA1C MFR BLD: 13.8 % (ref 4–6)
HCT VFR BLD AUTO: 41.6 % (ref 37–47)
HCV IGG SERPL QL IA: NONREACTIVE
HDLC SERPL-MCNC: 36 MG/DL
HGB BLD-MCNC: 13.1 G/DL (ref 12–16)
HIV 1+2 AB+HIV1 P24 AG SERPL QL IA: NONREACTIVE
IMMATURE GRANULOCYTES: 0.2 % (ref 0–0.5)
LDL/HDL RATIO: 1.9 (ref 1–3)
LDLC SERPL CALC-MCNC: 68.6 MG/DL (ref 0–100)
LYMPHOCYTES NFR BLD: 39.9 % (ref 19.3–53.1)
MCH RBC QN AUTO: 28.9 PG (ref 27–32)
MCHC RBC AUTO-ENTMCNC: 31.5 G/DL (ref 32–36)
MCV RBC AUTO: 91.6 FL (ref 82–100)
MICROALBUMIN QUANT: <1.2 MG/DL (ref 0–2)
MICROALBUMIN/CREATININE RATIO: NORMAL UG/MG (ref 0–30)
MONOCYTES NFR BLD: 7.9 % (ref 4.7–12.5)
NEUTROPHILS ABSOLUTE COUNT: 2.95 X 10 3/UL (ref 2.15–7.56)
NEUTROPHILS NFR BLD: 48.6 %
NUCLEATED RED BLOOD CELLS: 0 /100 WBC (ref 0–0.2)
PLATELET # BLD AUTO: 352 X 10 3/UL (ref 135–400)
POTASSIUM: 4.2 MMOL/L (ref 3.5–5.1)
PROT SNV-MCNC: 7.4 G/DL (ref 6.4–8.3)
RBC # BLD AUTO: 4.54 X 10 6/UL (ref 4.2–5.4)
RDW-SD: 42.6 FL (ref 37–54)
SODIUM: 141 MMOL/L (ref 136–145)
TRIGL SERPL-MCNC: 152 MG/DL (ref 0–150)
TSH SERPL DL<=0.005 MIU/L-ACNC: 0.8 UIU/ML (ref 0.27–4.2)
UREA NITROGEN (BUN): 6.5 MG/DL (ref 8–23)
WBC # BLD: 6.07 X 10 3/UL (ref 4.3–10.8)

## 2020-07-16 PROCEDURE — 99214 OFFICE O/P EST MOD 30 MIN: CPT | Mod: S$GLB,,, | Performed by: INTERNAL MEDICINE

## 2020-07-16 PROCEDURE — 99214 PR OFFICE/OUTPT VISIT, EST, LEVL IV, 30-39 MIN: ICD-10-PCS | Mod: S$GLB,,, | Performed by: INTERNAL MEDICINE

## 2020-07-16 RX ORDER — HYDRALAZINE HYDROCHLORIDE 50 MG/1
50 TABLET, FILM COATED ORAL 3 TIMES DAILY
Qty: 90 TABLET | Refills: 11 | Status: SHIPPED | OUTPATIENT
Start: 2020-07-16 | End: 2020-07-30

## 2020-07-16 RX ORDER — INSULIN LISPRO 100 [IU]/ML
5 INJECTION, SOLUTION INTRAVENOUS; SUBCUTANEOUS
Qty: 6 ML | Refills: 11 | Status: SHIPPED | OUTPATIENT
Start: 2020-07-16 | End: 2020-09-29 | Stop reason: SDUPTHER

## 2020-07-16 RX ORDER — MIRTAZAPINE 15 MG/1
15 TABLET, FILM COATED ORAL NIGHTLY
COMMUNITY
Start: 2020-07-15

## 2020-07-16 NOTE — PROGRESS NOTES
Subjective:      Patient ID: Radha Hoffman is a 60 y.o. female.    Chief Complaint: Follow-up and Diabetes     Patient with h/o DM with last A1c- 11.2. Patient reports FBS at home are fluctuating from  and Non-fasting BS = 300-400 . Patient reports polyuria, polydipsia, No numbness in hands or feet. Patient is taking Lantus 44 units BID + Bydureon. Patient is being followed by Ophthalmologist but not podiatrist. Patient BS are better than before but still running high especially after meals. Patient isnot adherent to diet.     Patient BP are not being monitored at home.     Patient reports improved nausea + reports occasional abdominal pain,     Review of Systems   Constitutional: Negative for chills, diaphoresis, fever, malaise/fatigue and weight loss (3 lbs weight gain ).   HENT: Negative for congestion, ear pain, sinus pain, sore throat and tinnitus.    Eyes: Negative for blurred vision and photophobia.   Respiratory: Negative for cough, hemoptysis, shortness of breath and wheezing.    Cardiovascular: Negative for chest pain, palpitations, orthopnea, leg swelling and PND.   Gastrointestinal: Positive for abdominal pain. Negative for blood in stool, constipation, diarrhea, heartburn, melena, nausea and vomiting.   Genitourinary: Negative for dysuria, frequency and urgency.   Musculoskeletal: Negative for back pain, myalgias and neck pain.   Skin: Negative for rash.   Neurological: Negative for dizziness, tremors, seizures, loss of consciousness and weakness.   Endo/Heme/Allergies: Negative for polydipsia.   Psychiatric/Behavioral: Negative for depression and hallucinations. The patient does not have insomnia.      Objective:     Physical Exam  Constitutional:       General: She is not in acute distress.     Appearance: She is not diaphoretic.   Neck:      Thyroid: No thyromegaly.   Cardiovascular:      Rate and Rhythm: Normal rate and regular rhythm.      Heart sounds: Normal heart sounds. No murmur.  "  Pulmonary:      Effort: Pulmonary effort is normal. No respiratory distress.      Breath sounds: Normal breath sounds. No wheezing.   Abdominal:      General: Bowel sounds are normal. There is no distension.      Palpations: Abdomen is soft.      Tenderness: There is no abdominal tenderness (no abdominal tenderness. ).   Lymphadenopathy:      Cervical: No cervical adenopathy.   Neurological:      Mental Status: She is alert and oriented to person, place, and time.   Psychiatric:         Behavior: Behavior normal.         Thought Content: Thought content normal.         Judgment: Judgment normal.       Assessment:       ICD-10-CM ICD-9-CM   1. Uncontrolled type 2 diabetes mellitus with hyperglycemia  E11.65 250.02   2. Essential hypertension  I10 401.9       Plan:   Patient fasting blood sugars are better controlled but non-fasting BS are still running high  Papo add Humalog 5 units before each meal.   Patient is not adherent to diet advised to adhere. Advised to avoid rice and potatos  Will refer to Diabetic educator.   Patient BP are running high will add Hydralazine  Advised patient to get labs done.        Medication List with Changes/Refills   New Medications    HYDRALAZINE (APRESOLINE) 50 MG TABLET    Take 1 tablet (50 mg total) by mouth 3 (three) times daily.    INSULIN LISPRO (HUMALOG KWIKPEN INSULIN) 100 UNIT/ML PEN    Inject 5 Units into the skin 3 (three) times daily before meals.   Current Medications    ATORVASTATIN (LIPITOR) 40 MG TABLET    TAKE 1 TABLET BY MOUTH ONCE DAILY FOR 30 DAYS    BD VEO INSULIN SYRINGE UF 1 ML 31 GAUGE X 15/64" SYRG    USE AS DIRECTED    DULOXETINE (CYMBALTA) 60 MG CAPSULE    Take 1 capsule (60 mg total) by mouth once daily.    EXENATIDE MICROSPHERES (BYDUREON BCISE) 2 MG/0.85 ML ATIN    Inject 2 mg into the skin every 7 days.    GLIMEPIRIDE (AMARYL) 2 MG TABLET    TAKE 2 TABLETS BY MOUTH ONCE DAILY    INSULIN (LANTUS SOLOSTAR U-100 INSULIN) GLARGINE 100 UNITS/ML (3ML) SUBQ " "PEN    Inject 80 Units into the skin once daily.    LOSARTAN (COZAAR) 100 MG TABLET    TAKE 1 TABLET BY MOUTH ONCE DAILY FOR 30 DAYS    MIRTAZAPINE (REMERON) 15 MG TABLET        MONTELUKAST (SINGULAIR) 10 MG TABLET    TAKE 1 TABLET BY MOUTH ONCE DAILY FOR 30 DAYS    ONDANSETRON (ZOFRAN-ODT) 4 MG TBDL    4 mg.    ONETOUCH ULTRA BLUE TEST STRIP STRP    USE  STRIP TO CHECK GLUCOSE THREE TIMES DAILY    PEN NEEDLE, DIABETIC (BD ULTRA-FINE SHORT PEN NEEDLE) 31 GAUGE X 5/16" NDLE    Use to give insulin once a day at bedtime    VARICELLA-ZOSTER [id number], PF, (SHINGRIX, PF,) 50 MCG/0.5 ML INJECTION    Administer two doses 2 months apart   Discontinued Medications    CEFDINIR (OMNICEF) 300 MG CAPSULE    300 mg.            "

## 2020-07-24 ENCOUNTER — TELEPHONE (OUTPATIENT)
Dept: INTERNAL MEDICINE | Facility: CLINIC | Age: 60
End: 2020-07-24

## 2020-07-24 NOTE — TELEPHONE ENCOUNTER
Patient states that she started new medications last week and is having eye swelling that started initially. States now that her neck and sides of her face is starting to swell also. Patient denies difficulty breathing. Please advise.   +insulin lispro 5 Units Subcutaneous 3 times daily before meals     +hydrALAZINE 50 MG         Take 1 tablet (50 mg total) by mouth 3 (three) times daily. - Oral

## 2020-07-30 ENCOUNTER — OFFICE VISIT (OUTPATIENT)
Dept: INTERNAL MEDICINE | Facility: CLINIC | Age: 60
End: 2020-07-30
Payer: MEDICAID

## 2020-07-30 VITALS
TEMPERATURE: 98 F | HEART RATE: 76 BPM | DIASTOLIC BLOOD PRESSURE: 80 MMHG | OXYGEN SATURATION: 98 % | HEIGHT: 59 IN | BODY MASS INDEX: 28.02 KG/M2 | WEIGHT: 139 LBS | SYSTOLIC BLOOD PRESSURE: 140 MMHG

## 2020-07-30 DIAGNOSIS — R22.0 FACIAL SWELLING: ICD-10-CM

## 2020-07-30 DIAGNOSIS — F33.41 RECURRENT MAJOR DEPRESSIVE DISORDER, IN PARTIAL REMISSION: ICD-10-CM

## 2020-07-30 DIAGNOSIS — E11.65 UNCONTROLLED TYPE 2 DIABETES MELLITUS WITH HYPERGLYCEMIA: Primary | ICD-10-CM

## 2020-07-30 DIAGNOSIS — I10 ESSENTIAL HYPERTENSION: ICD-10-CM

## 2020-07-30 PROCEDURE — 99214 OFFICE O/P EST MOD 30 MIN: CPT | Mod: S$GLB,,, | Performed by: INTERNAL MEDICINE

## 2020-07-30 PROCEDURE — 99214 PR OFFICE/OUTPT VISIT, EST, LEVL IV, 30-39 MIN: ICD-10-PCS | Mod: S$GLB,,, | Performed by: INTERNAL MEDICINE

## 2020-07-30 RX ORDER — EMPAGLIFLOZIN 10 MG/1
10 TABLET, FILM COATED ORAL ONCE
Qty: 30 TABLET | Refills: 3 | Status: SHIPPED | OUTPATIENT
Start: 2020-07-30 | End: 2020-07-30

## 2020-07-30 NOTE — PROGRESS NOTES
Subjective:      Patient ID: Radha Hoffman is a 60 y.o. female.    Chief Complaint: Follow-up     Patient with h/o DM with last A1c- 11.2. Patient reports FBS at home are fluctuating from  and Non-fasting BS = 250-460 . Patient reports polyuria, polydipsia, No numbness in hands or feet. Patient is taking Lantus 44 units BID + Bydureon + Humqlog 5 units before Each meal. Patient is being followed by Ophthalmologist but not podiatrist. Patient BS are better than before but still running high especially after meals. Patient isnot adherent to diet.     Patient BP are not being monitored at home. Patient was started on Hydralazine on last visit and patient reports she developed facial swelling. It is constantly there and even after stopping the Hydalazine. No ankle swelling, no shortness of breath, No PND or orthopnea.     Patient reports improved nausea + reports occasional abdominal pain,     Review of Systems   Constitutional: Negative for chills, diaphoresis, fever, malaise/fatigue and weight loss (6 lbs weight gain ).   HENT: Negative for congestion, ear pain, sinus pain, sore throat and tinnitus.    Eyes: Negative for blurred vision and photophobia.   Respiratory: Negative for cough, hemoptysis, shortness of breath and wheezing.    Cardiovascular: Negative for chest pain, palpitations, orthopnea, leg swelling and PND.   Gastrointestinal: Positive for abdominal pain. Negative for blood in stool, constipation, diarrhea, heartburn, melena, nausea and vomiting.   Genitourinary: Negative for dysuria, frequency and urgency.   Musculoskeletal: Negative for back pain, myalgias and neck pain.   Skin: Negative for rash.   Neurological: Negative for dizziness, tremors, seizures, loss of consciousness and weakness.   Endo/Heme/Allergies: Negative for polydipsia.   Psychiatric/Behavioral: Negative for depression and hallucinations. The patient does not have insomnia.      Objective:     Physical Exam  Constitutional:        General: She is not in acute distress.     Appearance: She is not diaphoretic.   Eyes:      Comments: Patient has some puffiness of the face.    Neck:      Thyroid: No thyromegaly.   Cardiovascular:      Rate and Rhythm: Normal rate and regular rhythm.      Heart sounds: Normal heart sounds. No murmur.      Comments: No edema  Pulmonary:      Effort: Pulmonary effort is normal. No respiratory distress.      Breath sounds: Normal breath sounds. No wheezing.   Abdominal:      General: Bowel sounds are normal. There is no distension.      Palpations: Abdomen is soft.      Tenderness: There is no abdominal tenderness (no abdominal tenderness. ).   Lymphadenopathy:      Cervical: No cervical adenopathy.   Neurological:      Mental Status: She is alert and oriented to person, place, and time.   Psychiatric:         Behavior: Behavior normal.         Thought Content: Thought content normal.         Judgment: Judgment normal.       Assessment:       ICD-10-CM ICD-9-CM   1. Uncontrolled type 2 diabetes mellitus with hyperglycemia  E11.65 250.02   2. Facial swelling  R22.0 784.2   3. Essential hypertension  I10 401.9   4. Recurrent major depressive disorder, in partial remission  F33.41 296.35       Plan:   Patient blood sugars are very fluctuating suggesting non adherence to diet  Advised patient to adhere with medication and diet.   Will increase pre meal insulin to 10 units  Will add Jardiance, medicine should help with blood pressure and blood sugar.  Patient is drinking lot of power rate.  Advised patient to stop using high sodium content drinks  Patient has gained 6 lb in last few weeks can be secondary to fluid retention vs better blood sugar control vs Mirtazapine  Advised patient to discuss with psychiatrist.  Mirtazapine.  Will check CMP and BNP.   Blood pressures are still high but on expectant improvement after stopping high sodium beverages + use of Jardiance.        Medication List with Changes/Refills  "  New Medications    EMPAGLIFLOZIN (JARDIANCE) 10 MG TABLET    Take 1 tablet (10 mg total) by mouth once. for 1 dose   Current Medications    ATORVASTATIN (LIPITOR) 40 MG TABLET    TAKE 1 TABLET BY MOUTH ONCE DAILY FOR 30 DAYS    BD VEO INSULIN SYRINGE UF 1 ML 31 GAUGE X 15/64" SYRG    USE AS DIRECTED    DULOXETINE (CYMBALTA) 60 MG CAPSULE    Take 1 capsule (60 mg total) by mouth once daily.    EXENATIDE MICROSPHERES (BYDUREON BCISE) 2 MG/0.85 ML ATIN    Inject 2 mg into the skin every 7 days.    GLIMEPIRIDE (AMARYL) 2 MG TABLET    TAKE 2 TABLETS BY MOUTH ONCE DAILY    INSULIN (LANTUS SOLOSTAR U-100 INSULIN) GLARGINE 100 UNITS/ML (3ML) SUBQ PEN    Inject 80 Units into the skin once daily.    INSULIN LISPRO (HUMALOG KWIKPEN INSULIN) 100 UNIT/ML PEN    Inject 5 Units into the skin 3 (three) times daily before meals.    LOSARTAN (COZAAR) 100 MG TABLET    TAKE 1 TABLET BY MOUTH ONCE DAILY FOR 30 DAYS    MIRTAZAPINE (REMERON) 15 MG TABLET        MONTELUKAST (SINGULAIR) 10 MG TABLET    TAKE 1 TABLET BY MOUTH ONCE DAILY FOR 30 DAYS    ONDANSETRON (ZOFRAN-ODT) 4 MG TBDL    4 mg.    ONETOUCH ULTRA BLUE TEST STRIP STRP    USE  STRIP TO CHECK GLUCOSE THREE TIMES DAILY    PEN NEEDLE, DIABETIC (BD ULTRA-FINE SHORT PEN NEEDLE) 31 GAUGE X 5/16" NDLE    Use to give insulin once a day at bedtime    VARICELLA-ZOSTER [id number], PF, (SHINGRIX, PF,) 50 MCG/0.5 ML INJECTION    Administer two doses 2 months apart   Discontinued Medications    HYDRALAZINE (APRESOLINE) 50 MG TABLET    Take 1 tablet (50 mg total) by mouth 3 (three) times daily.              "

## 2020-09-29 DIAGNOSIS — E11.65 UNCONTROLLED TYPE 2 DIABETES MELLITUS WITH HYPERGLYCEMIA: ICD-10-CM

## 2020-09-29 RX ORDER — INSULIN LISPRO 100 [IU]/ML
5 INJECTION, SOLUTION INTRAVENOUS; SUBCUTANEOUS
Qty: 6 ML | Refills: 11 | Status: SHIPPED | OUTPATIENT
Start: 2020-09-29 | End: 2021-07-08

## 2020-09-29 RX ORDER — GLIMEPIRIDE 2 MG/1
TABLET ORAL
Qty: 60 TABLET | Refills: 5 | Status: SHIPPED | OUTPATIENT
Start: 2020-09-29 | End: 2021-03-17

## 2020-09-29 RX ORDER — DULOXETIN HYDROCHLORIDE 30 MG/1
CAPSULE, DELAYED RELEASE ORAL
COMMUNITY
Start: 2020-09-17 | End: 2021-06-02

## 2021-03-19 ENCOUNTER — OFFICE VISIT (OUTPATIENT)
Dept: PRIMARY CARE CLINIC | Facility: CLINIC | Age: 61
End: 2021-03-19
Payer: MEDICAID

## 2021-03-19 VITALS
SYSTOLIC BLOOD PRESSURE: 131 MMHG | WEIGHT: 116 LBS | DIASTOLIC BLOOD PRESSURE: 73 MMHG | HEART RATE: 73 BPM | BODY MASS INDEX: 23.39 KG/M2 | OXYGEN SATURATION: 99 % | HEIGHT: 59 IN | TEMPERATURE: 98 F | RESPIRATION RATE: 16 BRPM

## 2021-03-19 DIAGNOSIS — B35.4 TINEA CORPORIS: ICD-10-CM

## 2021-03-19 DIAGNOSIS — I10 BENIGN ESSENTIAL HTN: ICD-10-CM

## 2021-03-19 DIAGNOSIS — Z12.31 BREAST CANCER SCREENING BY MAMMOGRAM: ICD-10-CM

## 2021-03-19 DIAGNOSIS — I10 ESSENTIAL HYPERTENSION: ICD-10-CM

## 2021-03-19 DIAGNOSIS — E11.65 UNCONTROLLED TYPE 2 DIABETES MELLITUS WITH HYPERGLYCEMIA: Primary | ICD-10-CM

## 2021-03-19 DIAGNOSIS — R63.4 ABNORMAL WEIGHT LOSS: ICD-10-CM

## 2021-03-19 PROCEDURE — 99214 OFFICE O/P EST MOD 30 MIN: CPT | Mod: S$GLB,,, | Performed by: INTERNAL MEDICINE

## 2021-03-19 PROCEDURE — 99214 PR OFFICE/OUTPT VISIT, EST, LEVL IV, 30-39 MIN: ICD-10-PCS | Mod: S$GLB,,, | Performed by: INTERNAL MEDICINE

## 2021-03-19 RX ORDER — KETOCONAZOLE 20 MG/G
CREAM TOPICAL DAILY
Qty: 30 G | Refills: 1 | Status: SHIPPED | OUTPATIENT
Start: 2021-03-19 | End: 2022-02-09

## 2021-03-19 RX ORDER — LOSARTAN POTASSIUM 100 MG/1
TABLET ORAL
Qty: 90 TABLET | Refills: 3 | Status: SHIPPED | OUTPATIENT
Start: 2021-03-19 | End: 2022-02-24 | Stop reason: SDUPTHER

## 2021-03-20 LAB
ALBUMIN SERPL-MCNC: 3.9 G/DL (ref 3.6–5.1)
ALBUMIN/GLOB SERPL: 1.4 (CALC) (ref 1–2.5)
ALP SERPL-CCNC: 89 U/L (ref 37–153)
ALT SERPL-CCNC: 12 U/L (ref 6–29)
AST SERPL-CCNC: 13 U/L (ref 10–35)
BASOPHILS # BLD AUTO: 19 CELLS/UL (ref 0–200)
BASOPHILS NFR BLD AUTO: 0.3 %
BILIRUB SERPL-MCNC: 0.4 MG/DL (ref 0.2–1.2)
BUN SERPL-MCNC: 8 MG/DL (ref 7–25)
BUN/CREAT SERPL: ABNORMAL (CALC) (ref 6–22)
CALCIUM SERPL-MCNC: 10 MG/DL (ref 8.6–10.4)
CHLORIDE SERPL-SCNC: 102 MMOL/L (ref 98–110)
CO2 SERPL-SCNC: 31 MMOL/L (ref 20–32)
CREAT SERPL-MCNC: 0.78 MG/DL (ref 0.5–0.99)
EOSINOPHIL # BLD AUTO: 82 CELLS/UL (ref 15–500)
EOSINOPHIL NFR BLD AUTO: 1.3 %
ERYTHROCYTE [DISTWIDTH] IN BLOOD BY AUTOMATED COUNT: 12.3 % (ref 11–15)
GFRSERPLBLD MDRD-ARVRAT: 81 ML/MIN/1.73M2
GLOBULIN SER CALC-MCNC: 2.8 G/DL (CALC) (ref 1.9–3.7)
GLUCOSE SERPL-MCNC: 193 MG/DL (ref 65–139)
HBA1C MFR BLD: >14 % OF TOTAL HGB
HCT VFR BLD AUTO: 42 % (ref 35–45)
HGB BLD-MCNC: 13.6 G/DL (ref 11.7–15.5)
LYMPHOCYTES # BLD AUTO: 2867 CELLS/UL (ref 850–3900)
LYMPHOCYTES NFR BLD AUTO: 45.5 %
MCH RBC QN AUTO: 29.3 PG (ref 27–33)
MCHC RBC AUTO-ENTMCNC: 32.4 G/DL (ref 32–36)
MCV RBC AUTO: 90.5 FL (ref 80–100)
MONOCYTES # BLD AUTO: 340 CELLS/UL (ref 200–950)
MONOCYTES NFR BLD AUTO: 5.4 %
NEUTROPHILS # BLD AUTO: 2993 CELLS/UL (ref 1500–7800)
NEUTROPHILS NFR BLD AUTO: 47.5 %
PLATELET # BLD AUTO: 346 THOUSAND/UL (ref 140–400)
PMV BLD REES-ECKER: 10.7 FL (ref 7.5–12.5)
POTASSIUM SERPL-SCNC: 4.1 MMOL/L (ref 3.5–5.3)
PROT SERPL-MCNC: 6.7 G/DL (ref 6.1–8.1)
RBC # BLD AUTO: 4.64 MILLION/UL (ref 3.8–5.1)
SODIUM SERPL-SCNC: 140 MMOL/L (ref 135–146)
TSH SERPL-ACNC: 0.55 MIU/L (ref 0.4–4.5)
WBC # BLD AUTO: 6.3 THOUSAND/UL (ref 3.8–10.8)

## 2021-03-22 ENCOUNTER — TELEPHONE (OUTPATIENT)
Dept: PRIMARY CARE CLINIC | Facility: CLINIC | Age: 61
End: 2021-03-22

## 2021-03-23 ENCOUNTER — TELEPHONE (OUTPATIENT)
Dept: PRIMARY CARE CLINIC | Facility: CLINIC | Age: 61
End: 2021-03-23

## 2021-03-23 DIAGNOSIS — E11.65 UNCONTROLLED TYPE 2 DIABETES MELLITUS WITH HYPERGLYCEMIA: ICD-10-CM

## 2021-03-23 RX ORDER — INSULIN GLARGINE 100 [IU]/ML
80 INJECTION, SOLUTION SUBCUTANEOUS DAILY
Qty: 2 BOX | Refills: 5 | Status: CANCELLED | OUTPATIENT
Start: 2021-03-23 | End: 2022-03-23

## 2021-03-26 DIAGNOSIS — E11.65 UNCONTROLLED TYPE 2 DIABETES MELLITUS WITH HYPERGLYCEMIA: ICD-10-CM

## 2021-03-26 RX ORDER — INSULIN GLARGINE 100 [IU]/ML
50 INJECTION, SOLUTION SUBCUTANEOUS 2 TIMES DAILY
Qty: 2 BOX | Refills: 5 | Status: SHIPPED | OUTPATIENT
Start: 2021-03-26 | End: 2022-02-09 | Stop reason: SDUPTHER

## 2021-03-30 ENCOUNTER — OFFICE VISIT (OUTPATIENT)
Dept: PRIMARY CARE CLINIC | Facility: CLINIC | Age: 61
End: 2021-03-30
Payer: MEDICAID

## 2021-03-30 VITALS
BODY MASS INDEX: 25.32 KG/M2 | DIASTOLIC BLOOD PRESSURE: 78 MMHG | TEMPERATURE: 99 F | WEIGHT: 125.63 LBS | HEIGHT: 59 IN | HEART RATE: 83 BPM | SYSTOLIC BLOOD PRESSURE: 165 MMHG | OXYGEN SATURATION: 98 %

## 2021-03-30 DIAGNOSIS — I10 BENIGN ESSENTIAL HTN: ICD-10-CM

## 2021-03-30 DIAGNOSIS — E11.65 UNCONTROLLED TYPE 2 DIABETES MELLITUS WITH HYPERGLYCEMIA: Primary | ICD-10-CM

## 2021-03-30 DIAGNOSIS — R11.2 NAUSEA AND VOMITING, INTRACTABILITY OF VOMITING NOT SPECIFIED, UNSPECIFIED VOMITING TYPE: ICD-10-CM

## 2021-03-30 PROCEDURE — 99214 OFFICE O/P EST MOD 30 MIN: CPT | Mod: S$GLB,,, | Performed by: INTERNAL MEDICINE

## 2021-03-30 PROCEDURE — 99214 PR OFFICE/OUTPT VISIT, EST, LEVL IV, 30-39 MIN: ICD-10-PCS | Mod: S$GLB,,, | Performed by: INTERNAL MEDICINE

## 2021-03-30 RX ORDER — VERAPAMIL HYDROCHLORIDE 120 MG/1
120 CAPSULE, EXTENDED RELEASE ORAL DAILY
Qty: 30 CAPSULE | Refills: 11 | Status: SHIPPED | OUTPATIENT
Start: 2021-03-30 | End: 2021-04-07

## 2021-03-30 RX ORDER — PROMETHAZINE HYDROCHLORIDE 25 MG/1
25 TABLET ORAL EVERY 6 HOURS PRN
Qty: 30 TABLET | Refills: 2 | Status: SHIPPED | OUTPATIENT
Start: 2021-03-30 | End: 2021-06-02

## 2021-04-06 ENCOUNTER — TELEPHONE (OUTPATIENT)
Dept: PRIMARY CARE CLINIC | Facility: CLINIC | Age: 61
End: 2021-04-06

## 2021-04-07 ENCOUNTER — TELEPHONE (OUTPATIENT)
Dept: PRIMARY CARE CLINIC | Facility: CLINIC | Age: 61
End: 2021-04-07

## 2021-04-07 DIAGNOSIS — I10 BENIGN ESSENTIAL HTN: Primary | ICD-10-CM

## 2021-04-07 RX ORDER — VERAPAMIL HYDROCHLORIDE 120 MG/1
120 TABLET, FILM COATED, EXTENDED RELEASE ORAL NIGHTLY
Qty: 30 TABLET | Refills: 11 | Status: SHIPPED | OUTPATIENT
Start: 2021-04-07 | End: 2021-04-30

## 2021-04-30 ENCOUNTER — TELEPHONE (OUTPATIENT)
Dept: PRIMARY CARE CLINIC | Facility: CLINIC | Age: 61
End: 2021-04-30

## 2021-04-30 ENCOUNTER — OFFICE VISIT (OUTPATIENT)
Dept: PRIMARY CARE CLINIC | Facility: CLINIC | Age: 61
End: 2021-04-30
Payer: MEDICAID

## 2021-04-30 VITALS
OXYGEN SATURATION: 98 % | BODY MASS INDEX: 26 KG/M2 | DIASTOLIC BLOOD PRESSURE: 82 MMHG | TEMPERATURE: 97 F | SYSTOLIC BLOOD PRESSURE: 146 MMHG | RESPIRATION RATE: 16 BRPM | HEIGHT: 59 IN | WEIGHT: 129 LBS | HEART RATE: 73 BPM

## 2021-04-30 DIAGNOSIS — Z23 NEED FOR TDAP VACCINATION: ICD-10-CM

## 2021-04-30 DIAGNOSIS — E11.42 TYPE 2 DIABETES MELLITUS WITH DIABETIC POLYNEUROPATHY, WITH LONG-TERM CURRENT USE OF INSULIN: Primary | ICD-10-CM

## 2021-04-30 DIAGNOSIS — Z23 NEED FOR 23-POLYVALENT PNEUMOCOCCAL POLYSACCHARIDE VACCINE: ICD-10-CM

## 2021-04-30 DIAGNOSIS — L65.9 HAIR LOSS: ICD-10-CM

## 2021-04-30 DIAGNOSIS — Z79.4 TYPE 2 DIABETES MELLITUS WITH DIABETIC POLYNEUROPATHY, WITH LONG-TERM CURRENT USE OF INSULIN: Primary | ICD-10-CM

## 2021-04-30 DIAGNOSIS — I10 BENIGN ESSENTIAL HTN: ICD-10-CM

## 2021-04-30 DIAGNOSIS — F33.42 RECURRENT MAJOR DEPRESSIVE DISORDER, IN FULL REMISSION: ICD-10-CM

## 2021-04-30 PROCEDURE — 99214 PR OFFICE/OUTPT VISIT, EST, LEVL IV, 30-39 MIN: ICD-10-PCS | Mod: S$GLB,,, | Performed by: INTERNAL MEDICINE

## 2021-04-30 PROCEDURE — 99214 OFFICE O/P EST MOD 30 MIN: CPT | Mod: S$GLB,,, | Performed by: INTERNAL MEDICINE

## 2021-04-30 RX ORDER — INSULIN PUMP SYRINGE, 3 ML
EACH MISCELLANEOUS
Qty: 1 EACH | Refills: 0 | Status: SHIPPED | OUTPATIENT
Start: 2021-04-30 | End: 2023-02-28

## 2021-04-30 RX ORDER — VERAPAMIL HYDROCHLORIDE 240 MG/1
240 CAPSULE, EXTENDED RELEASE ORAL DAILY
Qty: 30 CAPSULE | Refills: 11 | Status: SHIPPED | OUTPATIENT
Start: 2021-04-30 | End: 2022-02-24 | Stop reason: SDUPTHER

## 2021-04-30 RX ORDER — GABAPENTIN 300 MG/1
300 CAPSULE ORAL 3 TIMES DAILY
Qty: 90 CAPSULE | Refills: 11 | Status: SHIPPED | OUTPATIENT
Start: 2021-04-30 | End: 2022-07-28

## 2021-05-17 ENCOUNTER — TELEPHONE (OUTPATIENT)
Dept: PRIMARY CARE CLINIC | Facility: CLINIC | Age: 61
End: 2021-05-17

## 2021-06-02 ENCOUNTER — OFFICE VISIT (OUTPATIENT)
Dept: PRIMARY CARE CLINIC | Facility: CLINIC | Age: 61
End: 2021-06-02
Payer: MEDICAID

## 2021-06-02 VITALS
TEMPERATURE: 98 F | BODY MASS INDEX: 25.2 KG/M2 | HEART RATE: 80 BPM | WEIGHT: 125 LBS | HEIGHT: 59 IN | SYSTOLIC BLOOD PRESSURE: 104 MMHG | RESPIRATION RATE: 16 BRPM | DIASTOLIC BLOOD PRESSURE: 69 MMHG | OXYGEN SATURATION: 97 %

## 2021-06-02 DIAGNOSIS — F43.9 FEELING STRESSED OUT: ICD-10-CM

## 2021-06-02 DIAGNOSIS — M54.50 ACUTE MIDLINE LOW BACK PAIN WITHOUT SCIATICA: ICD-10-CM

## 2021-06-02 DIAGNOSIS — F33.42 RECURRENT MAJOR DEPRESSIVE DISORDER, IN FULL REMISSION: ICD-10-CM

## 2021-06-02 DIAGNOSIS — E11.42 TYPE 2 DIABETES MELLITUS WITH DIABETIC POLYNEUROPATHY, WITH LONG-TERM CURRENT USE OF INSULIN: Primary | ICD-10-CM

## 2021-06-02 DIAGNOSIS — Z79.4 TYPE 2 DIABETES MELLITUS WITH DIABETIC POLYNEUROPATHY, WITH LONG-TERM CURRENT USE OF INSULIN: Primary | ICD-10-CM

## 2021-06-02 PROCEDURE — 99214 OFFICE O/P EST MOD 30 MIN: CPT | Mod: S$GLB,,, | Performed by: INTERNAL MEDICINE

## 2021-06-02 PROCEDURE — 99214 PR OFFICE/OUTPT VISIT, EST, LEVL IV, 30-39 MIN: ICD-10-PCS | Mod: S$GLB,,, | Performed by: INTERNAL MEDICINE

## 2021-06-02 RX ORDER — ACETAMINOPHEN AND CODEINE PHOSPHATE 300; 30 MG/1; MG/1
1 TABLET ORAL EVERY 8 HOURS PRN
Qty: 20 TABLET | Refills: 0 | Status: SHIPPED | OUTPATIENT
Start: 2021-06-02 | End: 2021-06-12

## 2021-06-02 RX ORDER — HYDROXYZINE HYDROCHLORIDE 25 MG/1
25 TABLET, FILM COATED ORAL 3 TIMES DAILY PRN
Qty: 60 TABLET | Refills: 0 | Status: SHIPPED | OUTPATIENT
Start: 2021-06-02 | End: 2022-04-28

## 2021-06-02 RX ORDER — DULOXETIN HYDROCHLORIDE 60 MG/1
60 CAPSULE, DELAYED RELEASE ORAL DAILY
Qty: 30 CAPSULE | Refills: 11 | Status: SHIPPED | OUTPATIENT
Start: 2021-06-02 | End: 2021-07-09

## 2021-06-08 DIAGNOSIS — E11.65 UNCONTROLLED TYPE 2 DIABETES MELLITUS WITH HYPERGLYCEMIA: Primary | ICD-10-CM

## 2021-06-10 ENCOUNTER — PATIENT OUTREACH (OUTPATIENT)
Dept: ADMINISTRATIVE | Facility: HOSPITAL | Age: 61
End: 2021-06-10

## 2021-06-11 RX ORDER — ATORVASTATIN CALCIUM 40 MG/1
TABLET, FILM COATED ORAL
Qty: 30 TABLET | Refills: 0 | Status: SHIPPED | OUTPATIENT
Start: 2021-06-11 | End: 2021-08-05

## 2021-07-01 ENCOUNTER — PATIENT MESSAGE (OUTPATIENT)
Dept: ADMINISTRATIVE | Facility: OTHER | Age: 61
End: 2021-07-01

## 2021-07-09 ENCOUNTER — OFFICE VISIT (OUTPATIENT)
Dept: PRIMARY CARE CLINIC | Facility: CLINIC | Age: 61
End: 2021-07-09
Payer: MEDICAID

## 2021-07-09 VITALS
TEMPERATURE: 98 F | SYSTOLIC BLOOD PRESSURE: 139 MMHG | RESPIRATION RATE: 16 BRPM | DIASTOLIC BLOOD PRESSURE: 79 MMHG | BODY MASS INDEX: 26.94 KG/M2 | WEIGHT: 133.63 LBS | HEIGHT: 59 IN | HEART RATE: 84 BPM | OXYGEN SATURATION: 97 %

## 2021-07-09 DIAGNOSIS — Z79.4 TYPE 2 DIABETES MELLITUS WITH DIABETIC POLYNEUROPATHY, WITH LONG-TERM CURRENT USE OF INSULIN: Primary | ICD-10-CM

## 2021-07-09 DIAGNOSIS — M62.838 MUSCLE SPASM: ICD-10-CM

## 2021-07-09 DIAGNOSIS — Z23 NEED FOR 23-POLYVALENT PNEUMOCOCCAL POLYSACCHARIDE VACCINE: ICD-10-CM

## 2021-07-09 DIAGNOSIS — E11.42 TYPE 2 DIABETES MELLITUS WITH DIABETIC POLYNEUROPATHY, WITH LONG-TERM CURRENT USE OF INSULIN: Primary | ICD-10-CM

## 2021-07-09 PROCEDURE — 99214 PR OFFICE/OUTPT VISIT, EST, LEVL IV, 30-39 MIN: ICD-10-PCS | Mod: 25,S$GLB,, | Performed by: INTERNAL MEDICINE

## 2021-07-09 PROCEDURE — 99214 OFFICE O/P EST MOD 30 MIN: CPT | Mod: 25,S$GLB,, | Performed by: INTERNAL MEDICINE

## 2021-07-09 PROCEDURE — 90471 IMMUNIZATION ADMIN: CPT | Mod: S$GLB,,, | Performed by: INTERNAL MEDICINE

## 2021-07-09 PROCEDURE — 90732 PPSV23 VACC 2 YRS+ SUBQ/IM: CPT | Mod: S$GLB,,, | Performed by: INTERNAL MEDICINE

## 2021-07-09 PROCEDURE — 90471 PNEUMOCOCCAL POLYSACCHARIDE VACCINE 23-VALENT =>2YO SQ IM: ICD-10-PCS | Mod: S$GLB,,, | Performed by: INTERNAL MEDICINE

## 2021-07-09 PROCEDURE — 90732 PNEUMOCOCCAL POLYSACCHARIDE VACCINE 23-VALENT =>2YO SQ IM: ICD-10-PCS | Mod: S$GLB,,, | Performed by: INTERNAL MEDICINE

## 2021-07-09 RX ORDER — TIMOLOL MALEATE 5 MG/ML
1 SOLUTION/ DROPS OPHTHALMIC 2 TIMES DAILY
COMMUNITY
End: 2023-01-11

## 2021-07-09 RX ORDER — DIPHENOXYLATE HYDROCHLORIDE AND ATROPINE SULFATE 2.5; .025 MG/1; MG/1
1 TABLET ORAL 4 TIMES DAILY PRN
COMMUNITY
End: 2022-04-28

## 2021-07-09 RX ORDER — METHOCARBAMOL 500 MG/1
500 TABLET, FILM COATED ORAL 4 TIMES DAILY
Qty: 40 TABLET | Refills: 0 | Status: SHIPPED | OUTPATIENT
Start: 2021-07-09 | End: 2021-07-19

## 2021-07-09 RX ORDER — DULOXETIN HYDROCHLORIDE 30 MG/1
30 CAPSULE, DELAYED RELEASE ORAL DAILY
COMMUNITY
End: 2023-11-08

## 2021-12-03 ENCOUNTER — PATIENT OUTREACH (OUTPATIENT)
Dept: ADMINISTRATIVE | Facility: HOSPITAL | Age: 61
End: 2021-12-03
Payer: MEDICAID

## 2021-12-18 ENCOUNTER — PATIENT MESSAGE (OUTPATIENT)
Dept: ADMINISTRATIVE | Facility: HOSPITAL | Age: 61
End: 2021-12-18
Payer: MEDICAID

## 2022-02-09 ENCOUNTER — OFFICE VISIT (OUTPATIENT)
Dept: PRIMARY CARE CLINIC | Facility: CLINIC | Age: 62
End: 2022-02-09
Payer: MEDICAID

## 2022-02-09 VITALS
HEART RATE: 88 BPM | RESPIRATION RATE: 16 BRPM | HEIGHT: 59 IN | OXYGEN SATURATION: 99 % | DIASTOLIC BLOOD PRESSURE: 67 MMHG | TEMPERATURE: 97 F | SYSTOLIC BLOOD PRESSURE: 106 MMHG | BODY MASS INDEX: 23.87 KG/M2 | WEIGHT: 118.38 LBS

## 2022-02-09 DIAGNOSIS — M54.9 UPPER BACK PAIN: ICD-10-CM

## 2022-02-09 DIAGNOSIS — R21 RASH: ICD-10-CM

## 2022-02-09 DIAGNOSIS — I10 BENIGN ESSENTIAL HTN: ICD-10-CM

## 2022-02-09 DIAGNOSIS — Z79.4 TYPE 2 DIABETES MELLITUS WITH DIABETIC POLYNEUROPATHY, WITH LONG-TERM CURRENT USE OF INSULIN: Primary | ICD-10-CM

## 2022-02-09 DIAGNOSIS — E11.42 TYPE 2 DIABETES MELLITUS WITH DIABETIC POLYNEUROPATHY, WITH LONG-TERM CURRENT USE OF INSULIN: Primary | ICD-10-CM

## 2022-02-09 DIAGNOSIS — F33.42 RECURRENT MAJOR DEPRESSIVE DISORDER, IN FULL REMISSION: ICD-10-CM

## 2022-02-09 PROCEDURE — 99214 OFFICE O/P EST MOD 30 MIN: CPT | Mod: 25,S$GLB,, | Performed by: INTERNAL MEDICINE

## 2022-02-09 PROCEDURE — 90471 IMMUNIZATION ADMIN: CPT | Mod: S$GLB,,, | Performed by: INTERNAL MEDICINE

## 2022-02-09 PROCEDURE — 3008F BODY MASS INDEX DOCD: CPT | Mod: CPTII,S$GLB,, | Performed by: INTERNAL MEDICINE

## 2022-02-09 PROCEDURE — 3074F SYST BP LT 130 MM HG: CPT | Mod: CPTII,S$GLB,, | Performed by: INTERNAL MEDICINE

## 2022-02-09 PROCEDURE — 3078F PR MOST RECENT DIASTOLIC BLOOD PRESSURE < 80 MM HG: ICD-10-PCS | Mod: CPTII,S$GLB,, | Performed by: INTERNAL MEDICINE

## 2022-02-09 PROCEDURE — 3008F PR BODY MASS INDEX (BMI) DOCUMENTED: ICD-10-PCS | Mod: CPTII,S$GLB,, | Performed by: INTERNAL MEDICINE

## 2022-02-09 PROCEDURE — 90471 FLU VACCINE - QUADRIVALENT (RECOMBINANT) PRESERVATIVE FREE: ICD-10-PCS | Mod: S$GLB,,, | Performed by: INTERNAL MEDICINE

## 2022-02-09 PROCEDURE — 1159F PR MEDICATION LIST DOCUMENTED IN MEDICAL RECORD: ICD-10-PCS | Mod: CPTII,S$GLB,, | Performed by: INTERNAL MEDICINE

## 2022-02-09 PROCEDURE — 3074F PR MOST RECENT SYSTOLIC BLOOD PRESSURE < 130 MM HG: ICD-10-PCS | Mod: CPTII,S$GLB,, | Performed by: INTERNAL MEDICINE

## 2022-02-09 PROCEDURE — 90682 RIV4 VACC RECOMBINANT DNA IM: CPT | Mod: S$GLB,,, | Performed by: INTERNAL MEDICINE

## 2022-02-09 PROCEDURE — 90682 FLU VACCINE - QUADRIVALENT (RECOMBINANT) PRESERVATIVE FREE: ICD-10-PCS | Mod: S$GLB,,, | Performed by: INTERNAL MEDICINE

## 2022-02-09 PROCEDURE — 1159F MED LIST DOCD IN RCRD: CPT | Mod: CPTII,S$GLB,, | Performed by: INTERNAL MEDICINE

## 2022-02-09 PROCEDURE — 3078F DIAST BP <80 MM HG: CPT | Mod: CPTII,S$GLB,, | Performed by: INTERNAL MEDICINE

## 2022-02-09 PROCEDURE — 1160F PR REVIEW ALL MEDS BY PRESCRIBER/CLIN PHARMACIST DOCUMENTED: ICD-10-PCS | Mod: CPTII,S$GLB,, | Performed by: INTERNAL MEDICINE

## 2022-02-09 PROCEDURE — 1160F RVW MEDS BY RX/DR IN RCRD: CPT | Mod: CPTII,S$GLB,, | Performed by: INTERNAL MEDICINE

## 2022-02-09 PROCEDURE — 99214 PR OFFICE/OUTPT VISIT, EST, LEVL IV, 30-39 MIN: ICD-10-PCS | Mod: 25,S$GLB,, | Performed by: INTERNAL MEDICINE

## 2022-02-09 RX ORDER — INSULIN GLARGINE 100 [IU]/ML
20 INJECTION, SOLUTION SUBCUTANEOUS 2 TIMES DAILY
Qty: 2 EACH | Refills: 5 | Status: SHIPPED | OUTPATIENT
Start: 2022-02-09 | End: 2022-02-24 | Stop reason: SDUPTHER

## 2022-02-09 RX ORDER — CLOBETASOL PROPIONATE 0.5 MG/G
CREAM TOPICAL 2 TIMES DAILY
Qty: 15 G | Refills: 1 | Status: SHIPPED | OUTPATIENT
Start: 2022-02-09 | End: 2022-04-28

## 2022-02-09 NOTE — PROGRESS NOTES
Subjective:      Patient ID: Radha Hoffman is a 62 y.o. female.    Chief Complaint: Rash (C/o hives on back and BUE, ) and Follow-up (C/o tightness/ restriction under breast,unsteady gait, discuss mammogram and flu vaccine)     Patient with h/o DM with last A1c-= > 14 worsen from 11.2. Patient is checking blood sugars and Home BS are running 130-HI. Patient reports polyuria, polydipsia, reports burning in her feet. Patient was taking Lantus 26 units BID +  Humalog 16 units before breakfast and Lunch and 20 before dinner.  Patient is taking Humalog 20 units 3 times a day and is not taking Lantus.  [place] and has not kept her appointments.  Patient is taking gabapentin with much help with neuropathy/tingling and numbness in feet    Patient BP are not being monitored at home.  Patient is taking losartan regularly No ankle swelling, no shortness of breath, No PND or orthopnea.       Patient reports feeling stressed out and anxiety.  Patient is being followed by psychiatrist.  Patient was given Cymbalta 60 mg that is decreased to 30 mg by psychiatrist.     Patient reports rash in back x long. The rash is not itchy, no pain, no oozing. It looks like hives according to patient.    Patient complains of upper back pain that radiates to the front for long.  Pain is a bandlike pattern under the breast, pain is intermittent, dull, feel tight, worsened with activity and improved with rest.  Not associated with food.  Leaning forward makes the pain worse and sitting straight makes it better.      Review of Systems   Constitutional: Positive for weight loss (15 lbs weight loss). Negative for chills, diaphoresis, fever and malaise/fatigue.   HENT: Negative for congestion, ear pain, sinus pain, sore throat and tinnitus.    Eyes: Negative for blurred vision and photophobia.   Respiratory: Negative for cough, hemoptysis, shortness of breath and wheezing.    Cardiovascular: Negative for chest pain,  palpitations, orthopnea, leg swelling and PND.   Gastrointestinal: Negative for abdominal pain, blood in stool, constipation, diarrhea, heartburn, melena, nausea and vomiting.   Genitourinary: Positive for frequency. Negative for dysuria and urgency.   Musculoskeletal: Negative for back pain, myalgias and neck pain.   Skin: Positive for rash.   Neurological: Negative for dizziness, tremors, seizures, loss of consciousness and weakness.   Endo/Heme/Allergies: Positive for polydipsia.   Psychiatric/Behavioral: Negative for depression and hallucinations. The patient does not have insomnia.      Objective:     Physical Exam  Constitutional:       General: She is not in acute distress.     Appearance: She is not diaphoretic.   Neck:      Thyroid: No thyromegaly.   Cardiovascular:      Rate and Rhythm: Normal rate and regular rhythm.      Heart sounds: Normal heart sounds. No murmur heard.      Pulmonary:      Effort: Pulmonary effort is normal. No respiratory distress.      Breath sounds: Normal breath sounds. No wheezing.   Abdominal:      General: Bowel sounds are normal. There is no distension.      Palpations: Abdomen is soft.      Tenderness: There is no abdominal tenderness.   Musculoskeletal:      Comments: No thoracic area tenderness noted  No tenderness and epigastric or ribs noted   Lymphadenopathy:      Cervical: No cervical adenopathy.   Skin:     Comments: Has multiple small raised papules with white scaling on the top.    Neurological:      Mental Status: She is alert and oriented to person, place, and time.   Psychiatric:         Behavior: Behavior normal.         Thought Content: Thought content normal.         Judgment: Judgment normal.       Assessment:       ICD-10-CM ICD-9-CM   1. Type 2 diabetes mellitus with diabetic polyneuropathy, with long-term current use of insulin  E11.42 250.60    Z79.4 357.2     V58.67   2. Recurrent major depressive disorder, in full remission  F33.42 296.36   3. Benign  "essential HTN  I10 401.1   4. Rash  R21 782.1       Plan:   Patient last A1c of >14 is not at goal.   Patient fasting blood sugars are still running high.    Patient is off of Lantus + taking Humalog 20 units before each meal  Will start Lantus again 20 units in the morning  Advised patient to monitor blood sugars at home and bring log.  Patient to check blood sugar before and after meals 6 times a day  Offered patient to place CGM.. patient declined   Repeat labs were ordered last visit but not yet done.  Advised patient to get labs done  Will follow up patient closely.  Consult patient in detail about possible complication of diabetes  Patient depression and stress seems to better controlled with Cymbalta.  Will continue medication  Patient blood pressures are under good control.  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   Will use clobetasol cream  Patient last mammogram was in April 2021 will repeat mammogram in April 2022  Will order flu vaccine.  Patient has persistent upper back pain.  Will get x-ray lumbar spine  Patient take Tylenol for pain      Medication List with Changes/Refills   New Medications    CLOBETASOL (TEMOVATE) 0.05 % CREAM    Apply topically 2 (two) times daily.   Current Medications    ATORVASTATIN (LIPITOR) 40 MG TABLET    Take 1 tablet by mouth once daily    BD VEO INSULIN SYRINGE UF 1 ML 31 GAUGE X 15/64" SYRG    USE AS DIRECTED    BLOOD SUGAR DIAGNOSTIC (ONETOUCH ULTRA BLUE TEST STRIP) STRP    Check blood glucose three times daily    BLOOD-GLUCOSE METER KIT    Use as instructed    DIPHENOXYLATE-ATROPINE 2.5-0.025 MG (LOMOTIL) 2.5-0.025 MG PER TABLET    Take 1 tablet by mouth 4 (four) times daily as needed for Diarrhea.    DULOXETINE (CYMBALTA) 30 MG CAPSULE    Take 30 mg by mouth once daily.    GABAPENTIN (NEURONTIN) 300 MG CAPSULE    Take 1 capsule (300 mg total) by mouth 3 (three) times daily.    HUMALOG KWIKPEN INSULIN 100 UNIT/ML PEN    16 " "units before breakfast and lunch and 20 units before dinner.    HYDROXYZINE HCL (ATARAX) 25 MG TABLET    Take 1 tablet (25 mg total) by mouth 3 (three) times daily as needed for Anxiety.    LANCETS 32 GAUGE MISC    1 lancet by Misc.(Non-Drug; Combo Route) route 3 (three) times daily.    LOSARTAN (COZAAR) 100 MG TABLET    TAKE 1 TABLET BY MOUTH ONCE DAILY FOR 30 DAYS    MIRTAZAPINE (REMERON) 15 MG TABLET        MONTELUKAST (SINGULAIR) 10 MG TABLET    TAKE 1 TABLET BY MOUTH ONCE DAILY FOR 30 DAYS    PEN NEEDLE, DIABETIC (BD ULTRA-FINE SHORT PEN NEEDLE) 31 GAUGE X 5/16" NDLE    Use to give insulin once a day at bedtime    TIMOLOL MALEATE 0.5% (TIMOPTIC) 0.5 % DROP    1 drop 2 (two) times daily.    VERAPAMIL (VERELAN) 240 MG C24P    Take 1 capsule (240 mg total) by mouth once daily.   Changed and/or Refilled Medications    Modified Medication Previous Medication    INSULIN (LANTUS SOLOSTAR U-100 INSULIN) GLARGINE 100 UNITS/ML (3ML) SUBQ PEN insulin (LANTUS SOLOSTAR U-100 INSULIN) glargine 100 units/mL (3mL) SubQ pen       Inject 20 Units into the skin 2 (two) times a day.    Inject 50 Units into the skin 2 (two) times a day.   Discontinued Medications    KETOCONAZOLE (NIZORAL) 2 % CREAM    Apply topically once daily.                        "

## 2022-02-11 LAB
ALBUMIN SERPL-MCNC: 3.6 G/DL (ref 3.6–5.1)
ALBUMIN/CREAT UR: 158 MCG/MG CREAT
ALBUMIN/GLOB SERPL: 1.1 (CALC) (ref 1–2.5)
ALP SERPL-CCNC: 89 U/L (ref 37–153)
ALT SERPL-CCNC: 14 U/L (ref 6–29)
AST SERPL-CCNC: 20 U/L (ref 10–35)
BASOPHILS # BLD AUTO: 44 CELLS/UL (ref 0–200)
BASOPHILS NFR BLD AUTO: 0.5 %
BILIRUB SERPL-MCNC: 0.3 MG/DL (ref 0.2–1.2)
BUN SERPL-MCNC: 7 MG/DL (ref 7–25)
BUN/CREAT SERPL: ABNORMAL (CALC) (ref 6–22)
CALCIUM SERPL-MCNC: 9.5 MG/DL (ref 8.6–10.4)
CHLORIDE SERPL-SCNC: 103 MMOL/L (ref 98–110)
CHOLEST SERPL-MCNC: 181 MG/DL
CHOLEST/HDLC SERPL: 4.1 (CALC)
CO2 SERPL-SCNC: 30 MMOL/L (ref 20–32)
CREAT SERPL-MCNC: 0.69 MG/DL (ref 0.5–0.99)
CREAT UR-MCNC: 57 MG/DL (ref 20–275)
EOSINOPHIL # BLD AUTO: 9 CELLS/UL (ref 15–500)
EOSINOPHIL NFR BLD AUTO: 0.1 %
ERYTHROCYTE [DISTWIDTH] IN BLOOD BY AUTOMATED COUNT: 13.3 % (ref 11–15)
GLOBULIN SER CALC-MCNC: 3.3 G/DL (CALC) (ref 1.9–3.7)
GLUCOSE SERPL-MCNC: 162 MG/DL (ref 65–99)
HBA1C MFR BLD: >14 % OF TOTAL HGB
HCT VFR BLD AUTO: 41.4 % (ref 35–45)
HDLC SERPL-MCNC: 44 MG/DL
HGB BLD-MCNC: 14 G/DL (ref 11.7–15.5)
LDLC SERPL CALC-MCNC: 107 MG/DL (CALC)
LYMPHOCYTES # BLD AUTO: 2662 CELLS/UL (ref 850–3900)
LYMPHOCYTES NFR BLD AUTO: 30.6 %
MCH RBC QN AUTO: 29.1 PG (ref 27–33)
MCHC RBC AUTO-ENTMCNC: 33.8 G/DL (ref 32–36)
MCV RBC AUTO: 86.1 FL (ref 80–100)
MICROALBUMIN UR-MCNC: 9 MG/DL
MONOCYTES # BLD AUTO: 426 CELLS/UL (ref 200–950)
MONOCYTES NFR BLD AUTO: 4.9 %
NEUTROPHILS # BLD AUTO: 5559 CELLS/UL (ref 1500–7800)
NEUTROPHILS NFR BLD AUTO: 63.9 %
NONHDLC SERPL-MCNC: 137 MG/DL (CALC)
PLATELET # BLD AUTO: 385 THOUSAND/UL (ref 140–400)
PMV BLD REES-ECKER: 9.6 FL (ref 7.5–12.5)
POTASSIUM SERPL-SCNC: 4.5 MMOL/L (ref 3.5–5.3)
PROT SERPL-MCNC: 6.9 G/DL (ref 6.1–8.1)
RBC # BLD AUTO: 4.81 MILLION/UL (ref 3.8–5.1)
SODIUM SERPL-SCNC: 140 MMOL/L (ref 135–146)
TRIGL SERPL-MCNC: 181 MG/DL
TSH SERPL-ACNC: 0.65 MIU/L (ref 0.4–4.5)
WBC # BLD AUTO: 8.7 THOUSAND/UL (ref 3.8–10.8)

## 2022-02-18 ENCOUNTER — TELEPHONE (OUTPATIENT)
Dept: PRIMARY CARE CLINIC | Facility: CLINIC | Age: 62
End: 2022-02-18

## 2022-02-23 ENCOUNTER — OFFICE VISIT (OUTPATIENT)
Dept: PRIMARY CARE CLINIC | Facility: CLINIC | Age: 62
End: 2022-02-23
Payer: MEDICAID

## 2022-02-23 VITALS
RESPIRATION RATE: 16 BRPM | WEIGHT: 121.19 LBS | HEART RATE: 75 BPM | HEIGHT: 59 IN | TEMPERATURE: 97 F | SYSTOLIC BLOOD PRESSURE: 161 MMHG | OXYGEN SATURATION: 98 % | BODY MASS INDEX: 24.43 KG/M2 | DIASTOLIC BLOOD PRESSURE: 88 MMHG

## 2022-02-23 DIAGNOSIS — R07.81 RIB PAIN: Primary | ICD-10-CM

## 2022-02-23 DIAGNOSIS — F33.42 RECURRENT MAJOR DEPRESSIVE DISORDER, IN FULL REMISSION: ICD-10-CM

## 2022-02-23 DIAGNOSIS — E11.65 UNCONTROLLED TYPE 2 DIABETES MELLITUS WITH HYPERGLYCEMIA: ICD-10-CM

## 2022-02-23 DIAGNOSIS — I10 BENIGN ESSENTIAL HTN: ICD-10-CM

## 2022-02-23 DIAGNOSIS — R21 RASH: ICD-10-CM

## 2022-02-23 PROCEDURE — 3008F BODY MASS INDEX DOCD: CPT | Mod: CPTII,S$GLB,, | Performed by: INTERNAL MEDICINE

## 2022-02-23 PROCEDURE — 3066F NEPHROPATHY DOC TX: CPT | Mod: CPTII,S$GLB,, | Performed by: INTERNAL MEDICINE

## 2022-02-23 PROCEDURE — 3079F DIAST BP 80-89 MM HG: CPT | Mod: CPTII,S$GLB,, | Performed by: INTERNAL MEDICINE

## 2022-02-23 PROCEDURE — 1160F PR REVIEW ALL MEDS BY PRESCRIBER/CLIN PHARMACIST DOCUMENTED: ICD-10-PCS | Mod: CPTII,S$GLB,, | Performed by: INTERNAL MEDICINE

## 2022-02-23 PROCEDURE — 1159F MED LIST DOCD IN RCRD: CPT | Mod: CPTII,S$GLB,, | Performed by: INTERNAL MEDICINE

## 2022-02-23 PROCEDURE — 99215 PR OFFICE/OUTPT VISIT, EST, LEVL V, 40-54 MIN: ICD-10-PCS | Mod: S$GLB,,, | Performed by: INTERNAL MEDICINE

## 2022-02-23 PROCEDURE — 3008F PR BODY MASS INDEX (BMI) DOCUMENTED: ICD-10-PCS | Mod: CPTII,S$GLB,, | Performed by: INTERNAL MEDICINE

## 2022-02-23 PROCEDURE — 3060F PR POS MICROALBUMINURIA RESULT DOCUMENTED/REVIEW: ICD-10-PCS | Mod: CPTII,S$GLB,, | Performed by: INTERNAL MEDICINE

## 2022-02-23 PROCEDURE — 1160F RVW MEDS BY RX/DR IN RCRD: CPT | Mod: CPTII,S$GLB,, | Performed by: INTERNAL MEDICINE

## 2022-02-23 PROCEDURE — 3079F PR MOST RECENT DIASTOLIC BLOOD PRESSURE 80-89 MM HG: ICD-10-PCS | Mod: CPTII,S$GLB,, | Performed by: INTERNAL MEDICINE

## 2022-02-23 PROCEDURE — 3046F HEMOGLOBIN A1C LEVEL >9.0%: CPT | Mod: CPTII,S$GLB,, | Performed by: INTERNAL MEDICINE

## 2022-02-23 PROCEDURE — 3077F SYST BP >= 140 MM HG: CPT | Mod: CPTII,S$GLB,, | Performed by: INTERNAL MEDICINE

## 2022-02-23 PROCEDURE — 3046F PR MOST RECENT HEMOGLOBIN A1C LEVEL > 9.0%: ICD-10-PCS | Mod: CPTII,S$GLB,, | Performed by: INTERNAL MEDICINE

## 2022-02-23 PROCEDURE — 3077F PR MOST RECENT SYSTOLIC BLOOD PRESSURE >= 140 MM HG: ICD-10-PCS | Mod: CPTII,S$GLB,, | Performed by: INTERNAL MEDICINE

## 2022-02-23 PROCEDURE — 1159F PR MEDICATION LIST DOCUMENTED IN MEDICAL RECORD: ICD-10-PCS | Mod: CPTII,S$GLB,, | Performed by: INTERNAL MEDICINE

## 2022-02-23 PROCEDURE — 99215 OFFICE O/P EST HI 40 MIN: CPT | Mod: S$GLB,,, | Performed by: INTERNAL MEDICINE

## 2022-02-23 PROCEDURE — 3060F POS MICROALBUMINURIA REV: CPT | Mod: CPTII,S$GLB,, | Performed by: INTERNAL MEDICINE

## 2022-02-23 PROCEDURE — 3066F PR DOCUMENTATION OF TREATMENT FOR NEPHROPATHY: ICD-10-PCS | Mod: CPTII,S$GLB,, | Performed by: INTERNAL MEDICINE

## 2022-02-23 RX ORDER — PEN NEEDLE, DIABETIC 30 GX3/16"
NEEDLE, DISPOSABLE MISCELLANEOUS
Qty: 150 EACH | Refills: 11 | Status: SHIPPED | OUTPATIENT
Start: 2022-02-23 | End: 2022-09-14

## 2022-02-23 NOTE — PROGRESS NOTES
Subjective:      Patient ID: Radha Hoffman is a 62 y.o. female.    Chief Complaint: Sore Throat, Cough, and Pain     Patient with h/o DM with last A1c-= > 14 worsen from 11.2. Patient is checking blood sugars and Home BS are running  Fasting and Non-fasting BS = 200-286. Patient reports polyuria, polydipsia, reports burning in her feet. Patient was taking Lantus 20 units BID +  Humalog 16 units before breakfast and Lunch and 20 before dinner. Patient reports she has not taken Insulin x 1 week as she was out of [place].  Patient is taking gabapentin with much help with neuropathy/tingling and numbness in feet    Patient BP are not being monitored at home.  Patient is not sure what is she taking for BP. Patient is not taking any medicine.  No ankle swelling, no shortness of breath, No PND or orthopnea.     Patient reports feeling stressed out and anxiety.  Patient is being followed by psychiatrist.  Patent is on Cymbalta and reports taking medicine prescribed by Psychaitrist. .     Patient presented today with complaint of sore throat x 1 month.  Along with runny nose, nasal congestion, postnasal drip.  Patient also complains of cough, that is nonproductive, no wheezing or shortness of breath, no fever or chills    Patient reports tightness around the upper abdomen.  Feels like something wrapped around upper abdomen x more than year. Pain is intermittent, john on lying down and bending forward. Patient also complains of Mid back pain as well.      Review of Systems   Constitutional: Negative for chills, diaphoresis, fever, malaise/fatigue and weight loss (3lbs weight gain ).   HENT: Negative for congestion, ear pain, sinus pain, sore throat and tinnitus.    Eyes: Negative for blurred vision and photophobia.   Respiratory: Negative for cough, hemoptysis, shortness of breath and wheezing.    Cardiovascular: Negative for chest pain, palpitations, orthopnea, leg swelling and PND.   Gastrointestinal: Negative  for abdominal pain, blood in stool, constipation, diarrhea, heartburn, melena, nausea and vomiting.   Genitourinary: Positive for frequency. Negative for dysuria and urgency.   Musculoskeletal: Negative for back pain, myalgias and neck pain.   Skin: Positive for rash.   Neurological: Negative for dizziness, tremors, seizures, loss of consciousness and weakness.   Endo/Heme/Allergies: Positive for polydipsia.   Psychiatric/Behavioral: Negative for depression and hallucinations. The patient does not have insomnia.      Objective:     Physical Exam  Constitutional:       General: She is not in acute distress.     Appearance: She is not diaphoretic.   Neck:      Thyroid: No thyromegaly.   Cardiovascular:      Rate and Rhythm: Normal rate and regular rhythm.      Heart sounds: Normal heart sounds. No murmur heard.  Pulmonary:      Effort: Pulmonary effort is normal. No respiratory distress.      Breath sounds: Normal breath sounds. No wheezing.   Abdominal:      General: Bowel sounds are normal. There is no distension.      Palpations: Abdomen is soft.      Tenderness: There is no abdominal tenderness.   Musculoskeletal:      Comments: No epigastric Tenderness noted.   Bilateral lower ribs T8-10 are tender to palpation bilaterally   No Thoracis spine tenderness    Lymphadenopathy:      Cervical: No cervical adenopathy.   Skin:     Comments: Has multiple small raised papules with white scaling on the top.    Neurological:      Mental Status: She is alert and oriented to person, place, and time.   Psychiatric:         Behavior: Behavior normal.         Thought Content: Thought content normal.         Judgment: Judgment normal.       Assessment:       ICD-10-CM ICD-9-CM   1. Rib pain  R07.81 786.50   2. Uncontrolled type 2 diabetes mellitus with hyperglycemia  E11.65 250.02   3. Rash  R21 782.1   4. Benign essential HTN  I10 401.1   5. Recurrent major depressive disorder, in full remission  F33.42 296.36       Plan:  "  Patient last A1c of >14 is not at goal.   Patient fasting blood sugars seem better control but really fluctuating.  Patient nonfasting blood sugars are running much higher.  Patient is off of insulin for about a week.  Will restart Lantus 20 units in the morning + Humalog 20 units before each meal  Patient brought some blood sugar readings which seem better than before.  Patient blood pressures are running high and I am not sure if she is taking medication as prescribed  She cannot name the medications.  List of medication is printed and all meds explained to patient by staff  Advised patient to come to clinic tomorrow to get blood pressures checked again +  Patient to also bring all medications with her so meds can be explained to her.  Will refer to home health to assist patient with medication management.  Patient has rib pain bilateral lower ribs.  Will add x-ray ribs  Patient has rash in the lower lumbar area possible go Steinberg psoriasis vs eczema  Triamcinolone cream is given but patient is not able to apply it.  Patient lives alone and needs assistance.  Will refer to  to assist.  Patient blood sugars were running high and was given 6 units of Humalog to better control blood sugar  Patient blood sugar improved from 326-----> 249.    Total time spent in patient care 45 minutes      Medication List with Changes/Refills   Current Medications    ATORVASTATIN (LIPITOR) 40 MG TABLET    Take 1 tablet by mouth once daily    BD VEO INSULIN SYRINGE UF 1 ML 31 GAUGE X 15/64" SYRG    USE AS DIRECTED    BLOOD SUGAR DIAGNOSTIC (ONETOUCH ULTRA BLUE TEST STRIP) STRP    Check blood glucose three times daily    BLOOD-GLUCOSE METER KIT    Use as instructed    CLOBETASOL (TEMOVATE) 0.05 % CREAM    Apply topically 2 (two) times daily.    DIPHENOXYLATE-ATROPINE 2.5-0.025 MG (LOMOTIL) 2.5-0.025 MG PER TABLET    Take 1 tablet by mouth 4 (four) times daily as needed for Diarrhea.    DULOXETINE (CYMBALTA) 30 MG CAPSULE    " "Take 30 mg by mouth once daily.    GABAPENTIN (NEURONTIN) 300 MG CAPSULE    Take 1 capsule (300 mg total) by mouth 3 (three) times daily.    HUMALOG KWIKPEN INSULIN 100 UNIT/ML PEN    16 units before breakfast and lunch and 20 units before dinner.    HYDROXYZINE HCL (ATARAX) 25 MG TABLET    Take 1 tablet (25 mg total) by mouth 3 (three) times daily as needed for Anxiety.    INSULIN (LANTUS SOLOSTAR U-100 INSULIN) GLARGINE 100 UNITS/ML (3ML) SUBQ PEN    Inject 20 Units into the skin 2 (two) times a day.    LANCETS 32 GAUGE MISC    1 lancet by Misc.(Non-Drug; Combo Route) route 3 (three) times daily.    LOSARTAN (COZAAR) 100 MG TABLET    TAKE 1 TABLET BY MOUTH ONCE DAILY FOR 30 DAYS    MIRTAZAPINE (REMERON) 15 MG TABLET        MONTELUKAST (SINGULAIR) 10 MG TABLET    TAKE 1 TABLET BY MOUTH ONCE DAILY FOR 30 DAYS    TIMOLOL MALEATE 0.5% (TIMOPTIC) 0.5 % DROP    1 drop 2 (two) times daily.    VERAPAMIL (VERELAN) 240 MG C24P    Take 1 capsule (240 mg total) by mouth once daily.   Changed and/or Refilled Medications    Modified Medication Previous Medication    PEN NEEDLE, DIABETIC (BD ULTRA-FINE SHORT PEN NEEDLE) 31 GAUGE X 5/16" NDLE pen needle, diabetic (BD ULTRA-FINE SHORT PEN NEEDLE) 31 gauge x 5/16" Ndle       Use 1 needle 5 times/day    Use to give insulin once a day at bedtime                          "

## 2022-02-24 ENCOUNTER — CLINICAL SUPPORT (OUTPATIENT)
Dept: PRIMARY CARE CLINIC | Facility: CLINIC | Age: 62
End: 2022-02-24
Payer: MEDICAID

## 2022-02-24 VITALS — HEART RATE: 79 BPM | SYSTOLIC BLOOD PRESSURE: 168 MMHG | DIASTOLIC BLOOD PRESSURE: 78 MMHG

## 2022-02-24 DIAGNOSIS — R07.81 RIB PAIN: Primary | ICD-10-CM

## 2022-02-24 DIAGNOSIS — E11.42 TYPE 2 DIABETES MELLITUS WITH DIABETIC POLYNEUROPATHY, WITH LONG-TERM CURRENT USE OF INSULIN: ICD-10-CM

## 2022-02-24 DIAGNOSIS — Z79.4 TYPE 2 DIABETES MELLITUS WITH DIABETIC POLYNEUROPATHY, WITH LONG-TERM CURRENT USE OF INSULIN: ICD-10-CM

## 2022-02-24 DIAGNOSIS — E11.65 UNCONTROLLED TYPE 2 DIABETES MELLITUS WITH HYPERGLYCEMIA: ICD-10-CM

## 2022-02-24 DIAGNOSIS — I10 ESSENTIAL HYPERTENSION: ICD-10-CM

## 2022-02-24 DIAGNOSIS — I10 BENIGN ESSENTIAL HTN: ICD-10-CM

## 2022-02-24 DIAGNOSIS — J30.9 ALLERGIC RHINITIS, UNSPECIFIED SEASONALITY, UNSPECIFIED TRIGGER: ICD-10-CM

## 2022-02-24 PROCEDURE — 99211 PR OFFICE/OUTPT VISIT, EST, LEVL I: ICD-10-PCS | Mod: S$GLB,,, | Performed by: INTERNAL MEDICINE

## 2022-02-24 PROCEDURE — 99211 OFF/OP EST MAY X REQ PHY/QHP: CPT | Mod: S$GLB,,, | Performed by: INTERNAL MEDICINE

## 2022-02-24 RX ORDER — VERAPAMIL HYDROCHLORIDE 240 MG/1
240 CAPSULE, EXTENDED RELEASE ORAL DAILY
Qty: 90 CAPSULE | Refills: 3 | Status: SHIPPED | OUTPATIENT
Start: 2022-02-24 | End: 2022-03-24 | Stop reason: SDUPTHER

## 2022-02-24 RX ORDER — TRAMADOL HYDROCHLORIDE 50 MG/1
50 TABLET ORAL EVERY 6 HOURS PRN
Qty: 20 TABLET | Refills: 0 | Status: SHIPPED | OUTPATIENT
Start: 2022-02-24 | End: 2022-05-16

## 2022-02-24 RX ORDER — LOSARTAN POTASSIUM 100 MG/1
TABLET ORAL
Qty: 90 TABLET | Refills: 3 | Status: SHIPPED | OUTPATIENT
Start: 2022-02-24 | End: 2023-05-05 | Stop reason: SDUPTHER

## 2022-02-24 RX ORDER — INSULIN GLARGINE 100 [IU]/ML
20 INJECTION, SOLUTION SUBCUTANEOUS 2 TIMES DAILY
Qty: 2 EACH | Refills: 5 | Status: SHIPPED | OUTPATIENT
Start: 2022-02-24 | End: 2022-07-27 | Stop reason: SDUPTHER

## 2022-02-24 RX ORDER — ATORVASTATIN CALCIUM 40 MG/1
40 TABLET, FILM COATED ORAL DAILY
Qty: 90 TABLET | Refills: 3 | Status: SHIPPED | OUTPATIENT
Start: 2022-02-24 | End: 2023-05-05 | Stop reason: SDUPTHER

## 2022-02-24 RX ORDER — INSULIN LISPRO 100 [IU]/ML
INJECTION, SOLUTION INTRAVENOUS; SUBCUTANEOUS
Qty: 15 ML | Refills: 3 | Status: SHIPPED | OUTPATIENT
Start: 2022-02-24 | End: 2022-08-11

## 2022-02-24 RX ORDER — MONTELUKAST SODIUM 10 MG/1
TABLET ORAL
Qty: 90 TABLET | Refills: 3 | Status: SHIPPED | OUTPATIENT
Start: 2022-02-24 | End: 2023-05-05 | Stop reason: SDUPTHER

## 2022-02-24 NOTE — PROGRESS NOTES
Pt came in today for a medication check and blood pressure check...   Pt meds were checked against a printed medication list.  Staff crossed out the meds she wasn't taking and wrote on the med list what each med was for... pt voiced understanding and is aware how to take meds correctly..         Patient med list was reconciled and explained by staff to patient.  Patient is taking Lantus twice a day 20 mg will continue twice a day  Patient reports worsening pain in the upper abdomen which is band like.  Worsened with activity improved with rest  Will order x-ray chest.   Will give hydrocodone/APAP for pain.  If pain persist will order CT abdomen

## 2022-03-02 ENCOUNTER — TELEPHONE (OUTPATIENT)
Dept: PRIMARY CARE CLINIC | Facility: CLINIC | Age: 62
End: 2022-03-02
Payer: MEDICAID

## 2022-03-02 NOTE — TELEPHONE ENCOUNTER
----- Message from Natalie Guerra sent at 3/2/2022  9:52 AM CST -----  .Type:  Pharmacy Calling to Clarify an RX    Name of Caller:katherin  Pharmacy Name: [facility]  Prescription Name:verapamil  What do they need to clarify?: prior authorization needed asap  Best Call Back Number: [phone]  Additional Information:

## 2022-03-10 ENCOUNTER — TELEPHONE (OUTPATIENT)
Dept: PRIMARY CARE CLINIC | Facility: CLINIC | Age: 62
End: 2022-03-10
Payer: MEDICAID

## 2022-03-10 NOTE — TELEPHONE ENCOUNTER
I advised pt that her xray results came back normal and pt states that she is still experiencing pain on her right side under her breast and that she is about to go the ER because she is in pain and I told her if she do decide to go to the emergency room to give us a call with an update                 ----- Message from Rebekah Lemos sent at 3/10/2022  3:04 PM CST -----  Contact: self  Requesting a call back regarding x-rays results . Please call back at  [phone]

## 2022-03-16 ENCOUNTER — PATIENT OUTREACH (OUTPATIENT)
Dept: ADMINISTRATIVE | Facility: OTHER | Age: 62
End: 2022-03-16
Payer: MEDICAID

## 2022-03-22 NOTE — PROGRESS NOTES
[facility] - Unable to Contact    Closed episode at this time due to three missed phone call attempts for Manuela Perrodin contact.

## 2022-03-24 ENCOUNTER — OFFICE VISIT (OUTPATIENT)
Dept: PRIMARY CARE CLINIC | Facility: CLINIC | Age: 62
End: 2022-03-24
Payer: MEDICAID

## 2022-03-24 ENCOUNTER — PATIENT OUTREACH (OUTPATIENT)
Dept: ADMINISTRATIVE | Facility: HOSPITAL | Age: 62
End: 2022-03-24
Payer: MEDICAID

## 2022-03-24 VITALS
TEMPERATURE: 98 F | OXYGEN SATURATION: 98 % | SYSTOLIC BLOOD PRESSURE: 189 MMHG | HEIGHT: 59 IN | WEIGHT: 132.63 LBS | DIASTOLIC BLOOD PRESSURE: 87 MMHG | HEART RATE: 77 BPM | BODY MASS INDEX: 26.74 KG/M2 | RESPIRATION RATE: 16 BRPM

## 2022-03-24 DIAGNOSIS — Z12.31 BREAST CANCER SCREENING BY MAMMOGRAM: ICD-10-CM

## 2022-03-24 DIAGNOSIS — E11.65 UNCONTROLLED TYPE 2 DIABETES MELLITUS WITH HYPERGLYCEMIA: Primary | ICD-10-CM

## 2022-03-24 DIAGNOSIS — R60.0 LOCALIZED EDEMA: ICD-10-CM

## 2022-03-24 DIAGNOSIS — I10 BENIGN ESSENTIAL HTN: ICD-10-CM

## 2022-03-24 PROBLEM — L72.3 INFECTED SEBACEOUS CYST OF SKIN: Status: RESOLVED | Noted: 2020-02-12 | Resolved: 2022-03-24

## 2022-03-24 PROBLEM — L08.9 INFECTED SEBACEOUS CYST OF SKIN: Status: RESOLVED | Noted: 2020-02-12 | Resolved: 2022-03-24

## 2022-03-24 PROCEDURE — 99214 PR OFFICE/OUTPT VISIT, EST, LEVL IV, 30-39 MIN: ICD-10-PCS | Mod: S$GLB,,, | Performed by: INTERNAL MEDICINE

## 2022-03-24 PROCEDURE — 3046F HEMOGLOBIN A1C LEVEL >9.0%: CPT | Mod: CPTII,S$GLB,, | Performed by: INTERNAL MEDICINE

## 2022-03-24 PROCEDURE — 3008F PR BODY MASS INDEX (BMI) DOCUMENTED: ICD-10-PCS | Mod: CPTII,S$GLB,, | Performed by: INTERNAL MEDICINE

## 2022-03-24 PROCEDURE — 3066F PR DOCUMENTATION OF TREATMENT FOR NEPHROPATHY: ICD-10-PCS | Mod: CPTII,S$GLB,, | Performed by: INTERNAL MEDICINE

## 2022-03-24 PROCEDURE — 99214 OFFICE O/P EST MOD 30 MIN: CPT | Mod: S$GLB,,, | Performed by: INTERNAL MEDICINE

## 2022-03-24 PROCEDURE — 3077F PR MOST RECENT SYSTOLIC BLOOD PRESSURE >= 140 MM HG: ICD-10-PCS | Mod: CPTII,S$GLB,, | Performed by: INTERNAL MEDICINE

## 2022-03-24 PROCEDURE — 4010F ACE/ARB THERAPY RXD/TAKEN: CPT | Mod: CPTII,S$GLB,, | Performed by: INTERNAL MEDICINE

## 2022-03-24 PROCEDURE — 3060F POS MICROALBUMINURIA REV: CPT | Mod: CPTII,S$GLB,, | Performed by: INTERNAL MEDICINE

## 2022-03-24 PROCEDURE — 3079F DIAST BP 80-89 MM HG: CPT | Mod: CPTII,S$GLB,, | Performed by: INTERNAL MEDICINE

## 2022-03-24 PROCEDURE — 3066F NEPHROPATHY DOC TX: CPT | Mod: CPTII,S$GLB,, | Performed by: INTERNAL MEDICINE

## 2022-03-24 PROCEDURE — 3077F SYST BP >= 140 MM HG: CPT | Mod: CPTII,S$GLB,, | Performed by: INTERNAL MEDICINE

## 2022-03-24 PROCEDURE — 1159F MED LIST DOCD IN RCRD: CPT | Mod: CPTII,S$GLB,, | Performed by: INTERNAL MEDICINE

## 2022-03-24 PROCEDURE — 1160F PR REVIEW ALL MEDS BY PRESCRIBER/CLIN PHARMACIST DOCUMENTED: ICD-10-PCS | Mod: CPTII,S$GLB,, | Performed by: INTERNAL MEDICINE

## 2022-03-24 PROCEDURE — 3008F BODY MASS INDEX DOCD: CPT | Mod: CPTII,S$GLB,, | Performed by: INTERNAL MEDICINE

## 2022-03-24 PROCEDURE — 4010F PR ACE/ARB THEARPY RXD/TAKEN: ICD-10-PCS | Mod: CPTII,S$GLB,, | Performed by: INTERNAL MEDICINE

## 2022-03-24 PROCEDURE — 3079F PR MOST RECENT DIASTOLIC BLOOD PRESSURE 80-89 MM HG: ICD-10-PCS | Mod: CPTII,S$GLB,, | Performed by: INTERNAL MEDICINE

## 2022-03-24 PROCEDURE — 1160F RVW MEDS BY RX/DR IN RCRD: CPT | Mod: CPTII,S$GLB,, | Performed by: INTERNAL MEDICINE

## 2022-03-24 PROCEDURE — 3046F PR MOST RECENT HEMOGLOBIN A1C LEVEL > 9.0%: ICD-10-PCS | Mod: CPTII,S$GLB,, | Performed by: INTERNAL MEDICINE

## 2022-03-24 PROCEDURE — 3060F PR POS MICROALBUMINURIA RESULT DOCUMENTED/REVIEW: ICD-10-PCS | Mod: CPTII,S$GLB,, | Performed by: INTERNAL MEDICINE

## 2022-03-24 PROCEDURE — 1159F PR MEDICATION LIST DOCUMENTED IN MEDICAL RECORD: ICD-10-PCS | Mod: CPTII,S$GLB,, | Performed by: INTERNAL MEDICINE

## 2022-03-24 RX ORDER — VERAPAMIL HYDROCHLORIDE 240 MG/1
240 CAPSULE, EXTENDED RELEASE ORAL DAILY
Qty: 90 CAPSULE | Refills: 3 | Status: SHIPPED | OUTPATIENT
Start: 2022-03-24 | End: 2022-03-31

## 2022-03-24 RX ORDER — FUROSEMIDE 20 MG/1
20 TABLET ORAL DAILY
Qty: 30 TABLET | Refills: 11 | Status: SHIPPED | OUTPATIENT
Start: 2022-03-24 | End: 2022-05-16

## 2022-03-25 ENCOUNTER — TELEPHONE (OUTPATIENT)
Dept: PRIMARY CARE CLINIC | Facility: CLINIC | Age: 62
End: 2022-03-25

## 2022-03-25 DIAGNOSIS — E11.65 UNCONTROLLED TYPE 2 DIABETES MELLITUS WITH HYPERGLYCEMIA: Primary | ICD-10-CM

## 2022-03-25 NOTE — TELEPHONE ENCOUNTER
----- Message from Viviane Mansfield sent at 3/25/2022 10:25 AM CDT -----   Patient need to speak to nurse regarding a prior authorization on her medication.     [facility] [address] [place] [address]   Phone:  [phone]  Fax:  [phone]    Call back number [phone]. Tks

## 2022-03-29 ENCOUNTER — TELEPHONE (OUTPATIENT)
Dept: ADMINISTRATIVE | Facility: HOSPITAL | Age: 62
End: 2022-03-29
Payer: MEDICAID

## 2022-03-31 ENCOUNTER — CLINICAL SUPPORT (OUTPATIENT)
Dept: PRIMARY CARE CLINIC | Facility: CLINIC | Age: 62
End: 2022-03-31
Payer: MEDICAID

## 2022-03-31 VITALS — HEART RATE: 70 BPM | DIASTOLIC BLOOD PRESSURE: 75 MMHG | SYSTOLIC BLOOD PRESSURE: 143 MMHG

## 2022-03-31 DIAGNOSIS — I10 BENIGN ESSENTIAL HTN: Primary | ICD-10-CM

## 2022-03-31 PROCEDURE — 99211 OFF/OP EST MAY X REQ PHY/QHP: CPT | Mod: S$GLB,,, | Performed by: INTERNAL MEDICINE

## 2022-03-31 PROCEDURE — 99211 PR OFFICE/OUTPT VISIT, EST, LEVL I: ICD-10-PCS | Mod: S$GLB,,, | Performed by: INTERNAL MEDICINE

## 2022-03-31 RX ORDER — HYDRALAZINE HYDROCHLORIDE 25 MG/1
25 TABLET, FILM COATED ORAL EVERY 12 HOURS
Qty: 60 TABLET | Refills: 11 | Status: SHIPPED | OUTPATIENT
Start: 2022-03-31 | End: 2022-04-28

## 2022-03-31 NOTE — PROGRESS NOTES
Blood pressures are still running high.  Patient reports compliance with medication except verapamil.  Patient reports verapamil is not covered by insurance..  Will hold the medication  Will use hydralazine

## 2022-04-08 ENCOUNTER — CLINICAL SUPPORT (OUTPATIENT)
Dept: PRIMARY CARE CLINIC | Facility: CLINIC | Age: 62
End: 2022-04-08
Payer: MEDICAID

## 2022-04-08 VITALS — HEART RATE: 82 BPM | DIASTOLIC BLOOD PRESSURE: 67 MMHG | SYSTOLIC BLOOD PRESSURE: 119 MMHG

## 2022-04-08 DIAGNOSIS — I10 BENIGN ESSENTIAL HTN: ICD-10-CM

## 2022-04-08 DIAGNOSIS — M54.9 UPPER BACK PAIN: Primary | ICD-10-CM

## 2022-04-08 DIAGNOSIS — R19.06 EPIGASTRIC FULLNESS: ICD-10-CM

## 2022-04-08 PROCEDURE — 99211 OFF/OP EST MAY X REQ PHY/QHP: CPT | Mod: S$GLB,,, | Performed by: INTERNAL MEDICINE

## 2022-04-08 PROCEDURE — 99211 PR OFFICE/OUTPT VISIT, EST, LEVL I: ICD-10-PCS | Mod: S$GLB,,, | Performed by: INTERNAL MEDICINE

## 2022-04-08 NOTE — PROGRESS NOTES
Pt came in today for a blood pressure check.  Patient blood pressures are under good control.  Will continue same medication  Patient complained of persistent upper abdominal pain + upper back pain  Will order x-ray thoracic spine and gastric emptying study

## 2022-04-28 ENCOUNTER — OFFICE VISIT (OUTPATIENT)
Dept: PRIMARY CARE CLINIC | Facility: CLINIC | Age: 62
End: 2022-04-28
Payer: MEDICAID

## 2022-04-28 ENCOUNTER — PATIENT MESSAGE (OUTPATIENT)
Dept: FAMILY MEDICINE | Facility: CLINIC | Age: 62
End: 2022-04-28
Payer: MEDICAID

## 2022-04-28 VITALS
DIASTOLIC BLOOD PRESSURE: 65 MMHG | RESPIRATION RATE: 16 BRPM | WEIGHT: 136 LBS | SYSTOLIC BLOOD PRESSURE: 149 MMHG | OXYGEN SATURATION: 98 % | HEIGHT: 59 IN | TEMPERATURE: 98 F | HEART RATE: 58 BPM | BODY MASS INDEX: 27.42 KG/M2

## 2022-04-28 DIAGNOSIS — R10.13 EPIGASTRIC ABDOMINAL PAIN: ICD-10-CM

## 2022-04-28 DIAGNOSIS — I25.118 CORONARY ARTERY DISEASE OF NATIVE ARTERY OF NATIVE HEART WITH STABLE ANGINA PECTORIS: ICD-10-CM

## 2022-04-28 DIAGNOSIS — E11.65 UNCONTROLLED TYPE 2 DIABETES MELLITUS WITH HYPERGLYCEMIA: Primary | ICD-10-CM

## 2022-04-28 LAB
ALBUMIN SERPL-MCNC: 3.8 G/DL (ref 3.6–5.1)
ALBUMIN/GLOB SERPL: 1.4 (CALC) (ref 1–2.5)
ALP SERPL-CCNC: 89 U/L (ref 37–153)
ALT SERPL-CCNC: 21 U/L (ref 6–29)
AST SERPL-CCNC: 16 U/L (ref 10–35)
BILIRUB SERPL-MCNC: 0.4 MG/DL (ref 0.2–1.2)
BNP SERPL-MCNC: 228 PG/ML
BUN SERPL-MCNC: 13 MG/DL (ref 7–25)
BUN/CREAT SERPL: ABNORMAL (CALC) (ref 6–22)
CALCIUM SERPL-MCNC: 9.4 MG/DL (ref 8.6–10.4)
CHLORIDE SERPL-SCNC: 106 MMOL/L (ref 98–110)
CO2 SERPL-SCNC: 30 MMOL/L (ref 20–32)
CREAT SERPL-MCNC: 0.91 MG/DL (ref 0.5–0.99)
GLOBULIN SER CALC-MCNC: 2.8 G/DL (CALC) (ref 1.9–3.7)
GLUCOSE SERPL-MCNC: 216 MG/DL (ref 65–99)
POTASSIUM SERPL-SCNC: 4.5 MMOL/L (ref 3.5–5.3)
PROT SERPL-MCNC: 6.6 G/DL (ref 6.1–8.1)
SODIUM SERPL-SCNC: 142 MMOL/L (ref 135–146)

## 2022-04-28 PROCEDURE — 99214 PR OFFICE/OUTPT VISIT, EST, LEVL IV, 30-39 MIN: ICD-10-PCS | Mod: S$GLB,,, | Performed by: INTERNAL MEDICINE

## 2022-04-28 PROCEDURE — 3008F BODY MASS INDEX DOCD: CPT | Mod: CPTII,S$GLB,, | Performed by: INTERNAL MEDICINE

## 2022-04-28 PROCEDURE — 1160F PR REVIEW ALL MEDS BY PRESCRIBER/CLIN PHARMACIST DOCUMENTED: ICD-10-PCS | Mod: CPTII,S$GLB,, | Performed by: INTERNAL MEDICINE

## 2022-04-28 PROCEDURE — 3060F POS MICROALBUMINURIA REV: CPT | Mod: CPTII,S$GLB,, | Performed by: INTERNAL MEDICINE

## 2022-04-28 PROCEDURE — 1160F RVW MEDS BY RX/DR IN RCRD: CPT | Mod: CPTII,S$GLB,, | Performed by: INTERNAL MEDICINE

## 2022-04-28 PROCEDURE — 3078F DIAST BP <80 MM HG: CPT | Mod: CPTII,S$GLB,, | Performed by: INTERNAL MEDICINE

## 2022-04-28 PROCEDURE — 3046F HEMOGLOBIN A1C LEVEL >9.0%: CPT | Mod: CPTII,S$GLB,, | Performed by: INTERNAL MEDICINE

## 2022-04-28 PROCEDURE — 3066F PR DOCUMENTATION OF TREATMENT FOR NEPHROPATHY: ICD-10-PCS | Mod: CPTII,S$GLB,, | Performed by: INTERNAL MEDICINE

## 2022-04-28 PROCEDURE — 99214 OFFICE O/P EST MOD 30 MIN: CPT | Mod: S$GLB,,, | Performed by: INTERNAL MEDICINE

## 2022-04-28 PROCEDURE — 3078F PR MOST RECENT DIASTOLIC BLOOD PRESSURE < 80 MM HG: ICD-10-PCS | Mod: CPTII,S$GLB,, | Performed by: INTERNAL MEDICINE

## 2022-04-28 PROCEDURE — 4010F PR ACE/ARB THEARPY RXD/TAKEN: ICD-10-PCS | Mod: CPTII,S$GLB,, | Performed by: INTERNAL MEDICINE

## 2022-04-28 PROCEDURE — 3060F PR POS MICROALBUMINURIA RESULT DOCUMENTED/REVIEW: ICD-10-PCS | Mod: CPTII,S$GLB,, | Performed by: INTERNAL MEDICINE

## 2022-04-28 PROCEDURE — 3077F SYST BP >= 140 MM HG: CPT | Mod: CPTII,S$GLB,, | Performed by: INTERNAL MEDICINE

## 2022-04-28 PROCEDURE — 3046F PR MOST RECENT HEMOGLOBIN A1C LEVEL > 9.0%: ICD-10-PCS | Mod: CPTII,S$GLB,, | Performed by: INTERNAL MEDICINE

## 2022-04-28 PROCEDURE — 1159F PR MEDICATION LIST DOCUMENTED IN MEDICAL RECORD: ICD-10-PCS | Mod: CPTII,S$GLB,, | Performed by: INTERNAL MEDICINE

## 2022-04-28 PROCEDURE — 3008F PR BODY MASS INDEX (BMI) DOCUMENTED: ICD-10-PCS | Mod: CPTII,S$GLB,, | Performed by: INTERNAL MEDICINE

## 2022-04-28 PROCEDURE — 1159F MED LIST DOCD IN RCRD: CPT | Mod: CPTII,S$GLB,, | Performed by: INTERNAL MEDICINE

## 2022-04-28 PROCEDURE — 4010F ACE/ARB THERAPY RXD/TAKEN: CPT | Mod: CPTII,S$GLB,, | Performed by: INTERNAL MEDICINE

## 2022-04-28 PROCEDURE — 3077F PR MOST RECENT SYSTOLIC BLOOD PRESSURE >= 140 MM HG: ICD-10-PCS | Mod: CPTII,S$GLB,, | Performed by: INTERNAL MEDICINE

## 2022-04-28 PROCEDURE — 3066F NEPHROPATHY DOC TX: CPT | Mod: CPTII,S$GLB,, | Performed by: INTERNAL MEDICINE

## 2022-04-28 RX ORDER — ISOSORBIDE MONONITRATE 30 MG/1
30 TABLET, EXTENDED RELEASE ORAL DAILY
Qty: 30 TABLET | Refills: 11 | Status: SHIPPED | OUTPATIENT
Start: 2022-04-28 | End: 2023-04-17 | Stop reason: ALTCHOICE

## 2022-04-28 RX ORDER — METOPROLOL TARTRATE 50 MG/1
50 TABLET ORAL 2 TIMES DAILY
COMMUNITY
Start: 2022-04-16 | End: 2022-09-14 | Stop reason: SDUPTHER

## 2022-04-28 RX ORDER — FLASH GLUCOSE SCANNING READER
1 EACH MISCELLANEOUS DAILY
Qty: 1 EACH | Refills: 0 | Status: SHIPPED | OUTPATIENT
Start: 2022-04-28 | End: 2022-05-30 | Stop reason: SDUPTHER

## 2022-04-28 RX ORDER — NAPROXEN SODIUM 220 MG/1
81 TABLET, FILM COATED ORAL
COMMUNITY
Start: 2022-04-16 | End: 2022-07-27

## 2022-04-28 RX ORDER — OMEPRAZOLE 40 MG/1
40 CAPSULE, DELAYED RELEASE ORAL DAILY
Qty: 30 CAPSULE | Refills: 2 | Status: SHIPPED | OUTPATIENT
Start: 2022-04-28 | End: 2022-07-27 | Stop reason: SDUPTHER

## 2022-04-28 RX ORDER — FLASH GLUCOSE SENSOR
1 KIT MISCELLANEOUS
Qty: 2 KIT | Refills: 1 | Status: SHIPPED | OUTPATIENT
Start: 2022-04-28 | End: 2022-05-30 | Stop reason: SDUPTHER

## 2022-04-28 NOTE — PROGRESS NOTES
Subjective:      Patient ID: Radha Hoffman is a 62 y.o. female.    Chief Complaint: Diabetes (1 month f/u ) and Follow-up (F/u From [facility] hospital d/c for severe upper abd pain on 04/14/22, found blockage in artery, appt w/ Dr. Barrios 05/02/22)     Patient with h/o DM with last A1c-= > 14 worsen from 11.2.  Patient reports better adherence with diet and medication.  Patient is here today with another daughter who is diabetic herself and plans to help patient with her diabetes.  Patient is currently taking Lantus 25 units twice a day and Humalog 25 units before each meal.  Patient is taking gabapentin with much help with neuropathy.    Patient was recently admitted to [facility] secondary to non ST elevation MI.  See CTA was positive for RCA/lad disease along with 4 vessel disease.  Angiogram was done by Dr. Barrios, that showed moderate 3 vessel coronary artery disease.  No stents were placed and medical management was recommended.  Patient is on aspirin, beta blocker, ARB, statin.  Patient reports still having some chest pain on and off that is mostly at night when she is lying down, epigastric area.  Not associated with shortness of breath, palpitation.  Feels like a band wrapped around the upper abdomen.  Patient has complained of the same previously as well and x-ray of the ribs has been negative and was given tramadol with much help.  Patient also complains that the pain starts after food and there is feeling of fullness and bloating in the epigastric area as well.    Patient blood pressures are not being monitored at home.  Patient reports compliance with medication       Review of Systems   Constitutional: Positive for weight loss (4 lbs weight gain ). Negative for chills, diaphoresis, fever and malaise/fatigue.   HENT: Negative for congestion, ear pain, sinus pain, sore throat and tinnitus.    Eyes: Negative for blurred vision and photophobia.   Respiratory: Negative for cough,  hemoptysis, shortness of breath and wheezing.    Cardiovascular: Positive for chest pain. Negative for palpitations, orthopnea, leg swelling and PND.   Gastrointestinal: Negative for abdominal pain, blood in stool, constipation, diarrhea, heartburn, melena, nausea and vomiting.   Genitourinary: Positive for frequency. Negative for dysuria and urgency.   Musculoskeletal: Negative for back pain, myalgias and neck pain.   Skin: Negative for rash.   Neurological: Negative for dizziness, tremors, seizures, loss of consciousness and weakness.   Endo/Heme/Allergies: Positive for polydipsia.   Psychiatric/Behavioral: Negative for depression and hallucinations. The patient does not have insomnia.      Objective:     Physical Exam  Constitutional:       General: She is not in acute distress.     Appearance: She is not diaphoretic.   Neck:      Thyroid: No thyromegaly.   Cardiovascular:      Rate and Rhythm: Normal rate and regular rhythm.      Heart sounds: Normal heart sounds. No murmur heard.  Pulmonary:      Effort: Pulmonary effort is normal. No respiratory distress.      Breath sounds: Normal breath sounds. No wheezing.   Abdominal:      General: Bowel sounds are normal. There is no distension.      Palpations: Abdomen is soft.      Tenderness: There is abdominal tenderness (Epigastric tenderness).   Lymphadenopathy:      Cervical: No cervical adenopathy.   Neurological:      Mental Status: She is alert and oriented to person, place, and time.   Psychiatric:         Behavior: Behavior normal.         Thought Content: Thought content normal.         Judgment: Judgment normal.       Assessment:       ICD-10-CM ICD-9-CM   1. Uncontrolled type 2 diabetes mellitus with hyperglycemia  E11.65 250.02   2. Coronary artery disease of native artery of native heart with stable angina pectoris  I25.118 414.01     413.9   3. Epigastric abdominal pain  R10.13 789.06       Plan:   Patient last A1c of >14 is not at goal.   Patient  "fasting blood sugars seem better control fasting reading is 120 today  Patient is accompanied by her daughter who is assisting patient with medication and that probably contributed to better blood sugar control  Offered patient CGM placement to better understand patient blood sugar pattern.  Daughter will bring patient in next few days for CGM placement.  Daughter also requested freestyle Prasanna for patient and is willing to pay for it.  Will order  Patient blood pressures are running high.  Patient complains of chest pain though that does not seem cardiac in origin.  Patient left heart catheterization suggested coronary artery disease.  Will stop hydralazine and use isosorbide.  Patient epigastric pain is probably secondary to gastroparesis, causing fullness and abdominal pain in epigastric area.  Will stop Lomotil.  Will use PPI + gastric emptying study is ordered..  Will follow-up on the results        Medication List with Changes/Refills   New Medications    FLASH GLUCOSE SCANNING READER (FREESTYLE PRASANNA 2 READER) MISC    1 each by Misc.(Non-Drug; Combo Route) route once daily at 6am.    FLASH GLUCOSE SENSOR (FREESTYLE PRASANNA 2 SENSOR) KIT    1 each by Misc.(Non-Drug; Combo Route) route every 14 (fourteen) days.    ISOSORBIDE MONONITRATE (IMDUR) 30 MG 24 HR TABLET    Take 1 tablet (30 mg total) by mouth once daily.    OMEPRAZOLE (PRILOSEC) 40 MG CAPSULE    Take 1 capsule (40 mg total) by mouth once daily.   Current Medications    ASPIRIN 81 MG CHEW    81 mg.    ATORVASTATIN (LIPITOR) 40 MG TABLET    Take 1 tablet (40 mg total) by mouth once daily.    BD VEO INSULIN SYRINGE UF 1 ML 31 GAUGE X 15/64" SYRG    USE AS DIRECTED    BLOOD SUGAR DIAGNOSTIC (ONETOUCH ULTRA BLUE TEST STRIP) STRP    Check blood glucose three times daily    BLOOD-GLUCOSE METER KIT    Use as instructed    DIPHENOXYLATE-ATROPINE 2.5-0.025 MG (LOMOTIL) 2.5-0.025 MG PER TABLET    Take 1 tablet by mouth 4 (four) times daily as needed for Diarrhea. " "   DULOXETINE (CYMBALTA) 30 MG CAPSULE    Take 30 mg by mouth once daily.    FUROSEMIDE (LASIX) 20 MG TABLET    Take 1 tablet (20 mg total) by mouth once daily.    GABAPENTIN (NEURONTIN) 300 MG CAPSULE    Take 1 capsule (300 mg total) by mouth 3 (three) times daily.    HUMALOG KWIKPEN INSULIN 100 UNIT/ML PEN    16 units before breakfast and lunch and 20 units before dinner.    INSULIN (LANTUS SOLOSTAR U-100 INSULIN) GLARGINE 100 UNITS/ML (3ML) SUBQ PEN    Inject 20 Units into the skin 2 (two) times a day.    LANCETS 32 GAUGE MISC    1 lancet by Misc.(Non-Drug; Combo Route) route 3 (three) times daily.    LOSARTAN (COZAAR) 100 MG TABLET    TAKE 1 TABLET BY MOUTH ONCE DAILY FOR 30 DAYS    METOPROLOL TARTRATE (LOPRESSOR) 25 MG TABLET    25 mg.    MIRTAZAPINE (REMERON) 15 MG TABLET    Take 15 mg by mouth every evening.    MONTELUKAST (SINGULAIR) 10 MG TABLET    TAKE 1 TABLET BY MOUTH ONCE DAILY FOR 30 DAYS    PEN NEEDLE, DIABETIC (BD ULTRA-FINE SHORT PEN NEEDLE) 31 GAUGE X 5/16" NDLE    Use 1 needle 5 times/day    TIMOLOL MALEATE 0.5% (TIMOPTIC) 0.5 % DROP    1 drop 2 (two) times daily.    TRAMADOL (ULTRAM) 50 MG TABLET    Take 1 tablet (50 mg total) by mouth every 6 (six) hours as needed for Pain.   Discontinued Medications    CLOBETASOL (TEMOVATE) 0.05 % CREAM    Apply topically 2 (two) times daily.    HYDRALAZINE (APRESOLINE) 25 MG TABLET    Take 1 tablet (25 mg total) by mouth every 12 (twelve) hours.    HYDROXYZINE HCL (ATARAX) 25 MG TABLET    Take 1 tablet (25 mg total) by mouth 3 (three) times daily as needed for Anxiety.                            "

## 2022-05-04 ENCOUNTER — TELEPHONE (OUTPATIENT)
Dept: ADMINISTRATIVE | Facility: HOSPITAL | Age: 62
End: 2022-05-04
Payer: MEDICAID

## 2022-05-16 ENCOUNTER — OFFICE VISIT (OUTPATIENT)
Dept: PRIMARY CARE CLINIC | Facility: CLINIC | Age: 62
End: 2022-05-16
Payer: MEDICAID

## 2022-05-16 VITALS
OXYGEN SATURATION: 96 % | TEMPERATURE: 98 F | DIASTOLIC BLOOD PRESSURE: 70 MMHG | HEIGHT: 59 IN | WEIGHT: 135.19 LBS | BODY MASS INDEX: 27.25 KG/M2 | SYSTOLIC BLOOD PRESSURE: 156 MMHG | RESPIRATION RATE: 16 BRPM | HEART RATE: 66 BPM

## 2022-05-16 DIAGNOSIS — R10.13 EPIGASTRIC PAIN: ICD-10-CM

## 2022-05-16 DIAGNOSIS — E11.65 UNCONTROLLED TYPE 2 DIABETES MELLITUS WITH HYPERGLYCEMIA: ICD-10-CM

## 2022-05-16 DIAGNOSIS — R60.0 LOCALIZED EDEMA: Primary | ICD-10-CM

## 2022-05-16 DIAGNOSIS — I25.118 CORONARY ARTERY DISEASE OF NATIVE ARTERY OF NATIVE HEART WITH STABLE ANGINA PECTORIS: ICD-10-CM

## 2022-05-16 PROCEDURE — 99214 PR OFFICE/OUTPT VISIT, EST, LEVL IV, 30-39 MIN: ICD-10-PCS | Mod: S$GLB,,, | Performed by: INTERNAL MEDICINE

## 2022-05-16 PROCEDURE — 3077F PR MOST RECENT SYSTOLIC BLOOD PRESSURE >= 140 MM HG: ICD-10-PCS | Mod: CPTII,S$GLB,, | Performed by: INTERNAL MEDICINE

## 2022-05-16 PROCEDURE — 3066F PR DOCUMENTATION OF TREATMENT FOR NEPHROPATHY: ICD-10-PCS | Mod: CPTII,S$GLB,, | Performed by: INTERNAL MEDICINE

## 2022-05-16 PROCEDURE — 3077F SYST BP >= 140 MM HG: CPT | Mod: CPTII,S$GLB,, | Performed by: INTERNAL MEDICINE

## 2022-05-16 PROCEDURE — 1159F PR MEDICATION LIST DOCUMENTED IN MEDICAL RECORD: ICD-10-PCS | Mod: CPTII,S$GLB,, | Performed by: INTERNAL MEDICINE

## 2022-05-16 PROCEDURE — 3008F BODY MASS INDEX DOCD: CPT | Mod: CPTII,S$GLB,, | Performed by: INTERNAL MEDICINE

## 2022-05-16 PROCEDURE — 3066F NEPHROPATHY DOC TX: CPT | Mod: CPTII,S$GLB,, | Performed by: INTERNAL MEDICINE

## 2022-05-16 PROCEDURE — 1159F MED LIST DOCD IN RCRD: CPT | Mod: CPTII,S$GLB,, | Performed by: INTERNAL MEDICINE

## 2022-05-16 PROCEDURE — 3078F PR MOST RECENT DIASTOLIC BLOOD PRESSURE < 80 MM HG: ICD-10-PCS | Mod: CPTII,S$GLB,, | Performed by: INTERNAL MEDICINE

## 2022-05-16 PROCEDURE — 4010F ACE/ARB THERAPY RXD/TAKEN: CPT | Mod: CPTII,S$GLB,, | Performed by: INTERNAL MEDICINE

## 2022-05-16 PROCEDURE — 3060F POS MICROALBUMINURIA REV: CPT | Mod: CPTII,S$GLB,, | Performed by: INTERNAL MEDICINE

## 2022-05-16 PROCEDURE — 3046F PR MOST RECENT HEMOGLOBIN A1C LEVEL > 9.0%: ICD-10-PCS | Mod: CPTII,S$GLB,, | Performed by: INTERNAL MEDICINE

## 2022-05-16 PROCEDURE — 4010F PR ACE/ARB THEARPY RXD/TAKEN: ICD-10-PCS | Mod: CPTII,S$GLB,, | Performed by: INTERNAL MEDICINE

## 2022-05-16 PROCEDURE — 3008F PR BODY MASS INDEX (BMI) DOCUMENTED: ICD-10-PCS | Mod: CPTII,S$GLB,, | Performed by: INTERNAL MEDICINE

## 2022-05-16 PROCEDURE — 3078F DIAST BP <80 MM HG: CPT | Mod: CPTII,S$GLB,, | Performed by: INTERNAL MEDICINE

## 2022-05-16 PROCEDURE — 3046F HEMOGLOBIN A1C LEVEL >9.0%: CPT | Mod: CPTII,S$GLB,, | Performed by: INTERNAL MEDICINE

## 2022-05-16 PROCEDURE — 99214 OFFICE O/P EST MOD 30 MIN: CPT | Mod: S$GLB,,, | Performed by: INTERNAL MEDICINE

## 2022-05-16 PROCEDURE — 3060F PR POS MICROALBUMINURIA RESULT DOCUMENTED/REVIEW: ICD-10-PCS | Mod: CPTII,S$GLB,, | Performed by: INTERNAL MEDICINE

## 2022-05-16 RX ORDER — FUROSEMIDE 40 MG/1
40 TABLET ORAL DAILY
Qty: 30 TABLET | Refills: 11 | Status: SHIPPED | OUTPATIENT
Start: 2022-05-16 | End: 2023-05-05 | Stop reason: SDUPTHER

## 2022-05-16 RX ORDER — TRAMADOL HYDROCHLORIDE 50 MG/1
50 TABLET ORAL EVERY 6 HOURS
Qty: 30 TABLET | Refills: 0 | Status: SHIPPED | OUTPATIENT
Start: 2022-05-16 | End: 2022-07-27

## 2022-05-16 NOTE — PROGRESS NOTES
Subjective:      Patient ID: Radha Hoffman is a 62 y.o. female.    Chief Complaint: Diabetes (2wk f/u ;  ), Abdominal Pain (Pt c/o still having some pains under breast and its really bad...  ), Night Sweats, and Foot Swelling (Pt c/o bilat swelling of feet... pt have U/S scheduled for tomorrow and have appt w/cardiologist may 23)     Patient with h/o DM with last A1c-= > 14 worsen from 11.2.  Patient reports better adherence with diet and medication.  Patient is taking Lantus 20 units q.h.s. and Humalog 25 units t.i.d. patient blood sugar log suggest that blood sugars are running between 45 and 350. Review of freestyle Prasanna suggest that she has multiple hypoglycemic episode between 12 and 6 reaching 45. Patient dinner is between 7 and 8 p.m. blood sugar after the dinner are running high.       Patient was recently admitted to [facility] secondary to non ST elevation MI.  See CTA was positive for RCA/lad disease along with 4 vessel disease.  Angiogram was done by Dr. Barrios, that showed moderate 3 vessel coronary artery disease.  No stents were placed and medical management was recommended.  Patient is on aspirin, beta blocker, ARB, statin.  Patient also complains of bilateral ankle swelling x long.     Patient reports still having some chest pain on and off that is mostly at night when she is lying down, but today reports can occur on sitting or walking as well.  Not associated with shortness of breath, palpitation.  Feels like a band wrapped around the upper abdomen.  Patient has complained of the same previously as well and x-ray of the ribs has been negative and was given tramadol with much help.  Patient also complains that the pain starts after food and there is feeling of fullness and bloating in the epigastric area as well.    Patient blood pressures are not being monitored at home.  Patient reports compliance with medication.  Patient blood pressure previously were under good  control.       Review of Systems   Constitutional: Positive for weight loss (4 lbs weight gain ). Negative for chills, diaphoresis, fever and malaise/fatigue.   HENT: Negative for congestion, ear pain, sinus pain, sore throat and tinnitus.    Eyes: Negative for blurred vision and photophobia.   Respiratory: Negative for cough, hemoptysis, shortness of breath and wheezing.    Cardiovascular: Positive for chest pain. Negative for palpitations, orthopnea, leg swelling and PND.   Gastrointestinal: Negative for abdominal pain, blood in stool, constipation, diarrhea, heartburn, melena, nausea and vomiting.   Genitourinary: Positive for frequency. Negative for dysuria and urgency.   Musculoskeletal: Negative for back pain, myalgias and neck pain.   Skin: Negative for rash.   Neurological: Negative for dizziness, tremors, seizures, loss of consciousness and weakness.   Endo/Heme/Allergies: Positive for polydipsia.   Psychiatric/Behavioral: Negative for depression and hallucinations. The patient does not have insomnia.      Objective:     Physical Exam  Constitutional:       General: She is not in acute distress.     Appearance: She is not diaphoretic.   Neck:      Thyroid: No thyromegaly.   Cardiovascular:      Rate and Rhythm: Normal rate and regular rhythm.      Heart sounds: Normal heart sounds. No murmur heard.  Pulmonary:      Effort: Pulmonary effort is normal. No respiratory distress.      Breath sounds: Normal breath sounds. No wheezing.      Comments: Bilateral rib pain as well.   No pain when patient is distracted.  Abdominal:      General: Bowel sounds are normal. There is no distension.      Palpations: Abdomen is soft.      Tenderness: There is abdominal tenderness (Epigastric tenderness).   Musculoskeletal:      Right lower leg: Edema (2+ mid shin ) present.      Left lower leg: Edema (2+ mid shin ) present.   Lymphadenopathy:      Cervical: No cervical adenopathy.   Neurological:      Mental Status: She is  alert and oriented to person, place, and time.   Psychiatric:         Behavior: Behavior normal.         Thought Content: Thought content normal.         Judgment: Judgment normal.       Assessment:       ICD-10-CM ICD-9-CM   1. Localized edema  R60.0 782.3   2. Epigastric pain  R10.13 789.06   3. Uncontrolled type 2 diabetes mellitus with hyperglycemia  E11.65 250.02   4. Coronary artery disease of native artery of native heart with stable angina pectoris  I25.118 414.01     413.9       Plan:     Patient last A1c of > 14 is not at goal  Patient has multiple hypoglycemic episode between midnight and 6:00 a.m.  Will decrease Lantus to 16 units and Humalog to 20 units before dinner and 25 before lunch and breakfast.  Advised patient to adhere with diet and medication  Patient blood pressures are running high the reports compliance with medication  Patient has bilateral leg swelling/edema..  Will increase Lasix to 40 mg  Patient has appointment with cardiologist for echocardiogram in next few days  Patient has persistent vague/epigastric and lower rib pains..  X-ray rib series is not give  Gastric emptying study was ordered but not yet done.  Will follow-up on gastric emptying study  Will also order CT abdomen without contrast  Will also refer to GI for possible EGD  Patient has history of coronary artery disease..  Can this pain be secondary to angina.  Aggressive blood pressure control is recommended  Patient was given isosorbide on last visit without much improvement with epigastric and rib pain  Patient epigastric pain previously improved with tramadol.  Will refill medication.  Will check urine drug screen.        Medication List with Changes/Refills   New Medications    FUROSEMIDE (LASIX) 40 MG TABLET    Take 1 tablet (40 mg total) by mouth once daily.    TRAMADOL (ULTRAM) 50 MG TABLET    Take 1 tablet (50 mg total) by mouth every 6 (six) hours.   Current Medications    ASPIRIN 81 MG CHEW    81 mg.     "ATORVASTATIN (LIPITOR) 40 MG TABLET    Take 1 tablet (40 mg total) by mouth once daily.    BD VEO INSULIN SYRINGE UF 1 ML 31 GAUGE X 15/64" SYRG    USE AS DIRECTED    BLOOD SUGAR DIAGNOSTIC (ONETOUCH ULTRA BLUE TEST STRIP) STRP    Check blood glucose three times daily    BLOOD-GLUCOSE METER KIT    Use as instructed    DULOXETINE (CYMBALTA) 30 MG CAPSULE    Take 30 mg by mouth once daily.    FLASH GLUCOSE SCANNING READER (FREESTYLE CRISTOBAL 2 READER) MISC    1 each by Misc.(Non-Drug; Combo Route) route once daily at 6am.    FLASH GLUCOSE SENSOR (FREESTYLE CRISTOBAL 2 SENSOR) KIT    1 each by Misc.(Non-Drug; Combo Route) route every 14 (fourteen) days.    GABAPENTIN (NEURONTIN) 300 MG CAPSULE    Take 1 capsule (300 mg total) by mouth 3 (three) times daily.    HUMALOG KWIKPEN INSULIN 100 UNIT/ML PEN    16 units before breakfast and lunch and 20 units before dinner.    INSULIN (LANTUS SOLOSTAR U-100 INSULIN) GLARGINE 100 UNITS/ML (3ML) SUBQ PEN    Inject 20 Units into the skin 2 (two) times a day.    ISOSORBIDE MONONITRATE (IMDUR) 30 MG 24 HR TABLET    Take 1 tablet (30 mg total) by mouth once daily.    LANCETS 32 GAUGE MISC    1 lancet by Misc.(Non-Drug; Combo Route) route 3 (three) times daily.    LOSARTAN (COZAAR) 100 MG TABLET    TAKE 1 TABLET BY MOUTH ONCE DAILY FOR 30 DAYS    METOPROLOL TARTRATE (LOPRESSOR) 25 MG TABLET    25 mg.    MIRTAZAPINE (REMERON) 15 MG TABLET    Take 15 mg by mouth every evening.    MONTELUKAST (SINGULAIR) 10 MG TABLET    TAKE 1 TABLET BY MOUTH ONCE DAILY FOR 30 DAYS    OMEPRAZOLE (PRILOSEC) 40 MG CAPSULE    Take 1 capsule (40 mg total) by mouth once daily.    PEN NEEDLE, DIABETIC (BD ULTRA-FINE SHORT PEN NEEDLE) 31 GAUGE X 5/16" NDLE    Use 1 needle 5 times/day    TIMOLOL MALEATE 0.5% (TIMOPTIC) 0.5 % DROP    1 drop 2 (two) times daily.   Discontinued Medications    FUROSEMIDE (LASIX) 20 MG TABLET    Take 1 tablet (20 mg total) by mouth once daily.    TRAMADOL (ULTRAM) 50 MG TABLET    Take 1 " tablet (50 mg total) by mouth every 6 (six) hours as needed for Pain.

## 2022-05-17 LAB
ALBUMIN SERPL-MCNC: 3.8 G/DL (ref 3.6–5.1)
ALBUMIN/GLOB SERPL: 1.2 (CALC) (ref 1–2.5)
ALP SERPL-CCNC: 77 U/L (ref 37–153)
ALT SERPL-CCNC: 17 U/L (ref 6–29)
AMPHETAMINES UR QL: NEGATIVE NG/ML
AST SERPL-CCNC: 18 U/L (ref 10–35)
BARBITURATES UR QL: NEGATIVE NG/ML
BENZODIAZ UR QL: NEGATIVE NG/ML
BILIRUB SERPL-MCNC: 0.4 MG/DL (ref 0.2–1.2)
BNP SERPL-MCNC: 55 PG/ML
BUN SERPL-MCNC: 14 MG/DL (ref 7–25)
BUN/CREAT SERPL: ABNORMAL (CALC) (ref 6–22)
BZE UR QL: NEGATIVE NG/ML
CALCIUM SERPL-MCNC: 9.5 MG/DL (ref 8.6–10.4)
CHLORIDE SERPL-SCNC: 106 MMOL/L (ref 98–110)
CO2 SERPL-SCNC: 30 MMOL/L (ref 20–32)
CREAT SERPL-MCNC: 0.91 MG/DL (ref 0.5–0.99)
CREAT UR-MCNC: 91.1 MG/DL
GLOBULIN SER CALC-MCNC: 3.3 G/DL (CALC) (ref 1.9–3.7)
GLUCOSE SERPL-MCNC: 175 MG/DL (ref 65–139)
HBA1C MFR BLD: 9.4 % OF TOTAL HGB
METHADONE UR QL: NEGATIVE NG/ML
NOTES AND COMMENTS: NORMAL
OPIATES UR QL: NEGATIVE NG/ML
OXIDANTS UR QL: NEGATIVE MCG/ML
OXYCODONE UR QL: NEGATIVE NG/ML
PCP UR QL: NEGATIVE NG/ML
PH UR: 5.3 [PH] (ref 4.5–9)
POTASSIUM SERPL-SCNC: 4 MMOL/L (ref 3.5–5.3)
PROT SERPL-MCNC: 7.1 G/DL (ref 6.1–8.1)
SODIUM SERPL-SCNC: 143 MMOL/L (ref 135–146)
THC UR QL: NEGATIVE NG/ML

## 2022-05-30 DIAGNOSIS — E11.65 UNCONTROLLED TYPE 2 DIABETES MELLITUS WITH HYPERGLYCEMIA: ICD-10-CM

## 2022-05-30 RX ORDER — FLASH GLUCOSE SENSOR
1 KIT MISCELLANEOUS
Qty: 2 KIT | Refills: 1 | Status: SHIPPED | OUTPATIENT
Start: 2022-05-30 | End: 2022-09-20

## 2022-05-30 RX ORDER — FLASH GLUCOSE SCANNING READER
1 EACH MISCELLANEOUS DAILY
Qty: 1 EACH | Refills: 0 | Status: SHIPPED | OUTPATIENT
Start: 2022-05-30 | End: 2023-02-28

## 2022-05-30 NOTE — TELEPHONE ENCOUNTER
----- Message from Arlet Marie sent at 5/30/2022  9:49 AM CDT -----  Contact: self  Type:  RX Refill Request    Who Called: Patiuent  Refill or New Rx:refill  RX Name and Strength:flash glucose sensor (FREESTYLE CRISTOBAL 2 SENSOR) Kit  How is the patient currently taking it? (ex. 1XDay):As needed  Is this a 30 day or 90 day RX:90  Preferred Pharmacy with phone number:[facility]  Local or Mail Order:local  Ordering Provider:Eliu Mathew  Would the patient rather a call back or a response via [facility]? N/a  Best Call Back Number:n/a  Additional Information: n/a

## 2022-06-08 ENCOUNTER — TELEPHONE (OUTPATIENT)
Dept: PRIMARY CARE CLINIC | Facility: CLINIC | Age: 62
End: 2022-06-08
Payer: MEDICAID

## 2022-06-08 LAB — BCS RECOMMENDATION EXT: NORMAL

## 2022-06-08 NOTE — TELEPHONE ENCOUNTER
----- Message from Jovon Dalton sent at 6/8/2022 11:53 AM CDT -----  Contact: [facility]- Dee  Please call Dee from [facility] regarding the patient order.    She need clarification.  Patient is there now      Call back #  [phone]

## 2022-06-08 NOTE — TELEPHONE ENCOUNTER
S/w radiology and gave verbal to change mammogram to diagnostic due to bilateral breast pain/swelling.

## 2022-06-09 ENCOUNTER — PATIENT OUTREACH (OUTPATIENT)
Dept: ADMINISTRATIVE | Facility: HOSPITAL | Age: 62
End: 2022-06-09
Payer: MEDICAID

## 2022-06-16 DIAGNOSIS — E11.65 UNCONTROLLED TYPE 2 DIABETES MELLITUS WITH HYPERGLYCEMIA: ICD-10-CM

## 2022-06-16 NOTE — TELEPHONE ENCOUNTER
----- Message from Ivory Pruitt sent at 6/16/2022  1:44 PM CDT -----  Type:  RX Refill Request    Who Called: Radha Hoffman    Refill or New Rx: refill   RX Name and Strength: lancets 32 gauge Misc  How is the patient currently taking it? (ex. 1XDay): 2Xday  Is this a 30 day or 90 day RX: 100 each   Preferred Pharmacy with phone number:  [facility] - [place], [place] - [address]  [address] [address]  Phone: [phone] Fax: [phone]      Local or Mail Order: Local   Ordering Provider: Priyanka   Would the patient rather a call back or a response via [facility]?    Best Call Back Number:[phone]    Additional Information:

## 2022-06-21 DIAGNOSIS — E11.65 UNCONTROLLED TYPE 2 DIABETES MELLITUS WITH HYPERGLYCEMIA: ICD-10-CM

## 2022-06-21 NOTE — TELEPHONE ENCOUNTER
----- Message from Mimi Kenya sent at 6/21/2022 10:13 AM CDT -----  Contact: Patient  Patient called to consult with nurse or staff regarding her prescription for Lancets. She states she called the pharmacy but they don't have the prescription filled because the haven't heard from the office. Patient would like this sent in as soon as possible. Patient would also like a call back once its been called in and can be reached at [phone]. Thanks/

## 2022-07-15 ENCOUNTER — TELEPHONE (OUTPATIENT)
Dept: UROLOGY | Facility: CLINIC | Age: 62
End: 2022-07-15
Payer: MEDICAID

## 2022-07-15 NOTE — TELEPHONE ENCOUNTER
Pt daughter calling about her mom being in the hospital. She has catheter due to retention and not able to urinate. Daughter stated nurses have taken her catheter out a few times and have had to reinsert it.  I informed daughter she needs to keep catheter in for a few weeks and then follow up with us. I have scheduled pt and informed daughter of date and time. She verbalized understanding.     ----- Message from Carli Lau sent at 7/15/2022  9:17 AM CDT -----  Contact: marlene  .Type:  Sooner Apoointment Request    Caller is requesting a sooner appointment.  Caller declined first available appointment listed below.  Caller will not accept being placed on the waitlist and is requesting a message be sent to doctor.  Name of Caller:Gabriela  When is the first available appointment?8./11/2022  Symptoms:catheter  Would the patient rather a call back or a response via [facility]? callback  Best Call Back Number:[phone]  Additional Information: pt disharge from hospital

## 2022-07-27 ENCOUNTER — OFFICE VISIT (OUTPATIENT)
Dept: PRIMARY CARE CLINIC | Facility: CLINIC | Age: 62
End: 2022-07-27
Payer: MEDICAID

## 2022-07-27 VITALS
OXYGEN SATURATION: 96 % | HEIGHT: 59 IN | TEMPERATURE: 97 F | SYSTOLIC BLOOD PRESSURE: 175 MMHG | WEIGHT: 146.63 LBS | RESPIRATION RATE: 16 BRPM | DIASTOLIC BLOOD PRESSURE: 71 MMHG | BODY MASS INDEX: 29.56 KG/M2 | HEART RATE: 62 BPM

## 2022-07-27 DIAGNOSIS — E11.42 TYPE 2 DIABETES MELLITUS WITH DIABETIC POLYNEUROPATHY, WITH LONG-TERM CURRENT USE OF INSULIN: ICD-10-CM

## 2022-07-27 DIAGNOSIS — I25.118 CORONARY ARTERY DISEASE OF NATIVE ARTERY OF NATIVE HEART WITH STABLE ANGINA PECTORIS: Primary | ICD-10-CM

## 2022-07-27 DIAGNOSIS — R60.0 LOCALIZED EDEMA: ICD-10-CM

## 2022-07-27 DIAGNOSIS — Z79.4 TYPE 2 DIABETES MELLITUS WITH DIABETIC POLYNEUROPATHY, WITH LONG-TERM CURRENT USE OF INSULIN: ICD-10-CM

## 2022-07-27 DIAGNOSIS — R10.13 EPIGASTRIC ABDOMINAL PAIN: ICD-10-CM

## 2022-07-27 PROBLEM — R65.21 SEPTIC SHOCK: Status: ACTIVE | Noted: 2022-07-27

## 2022-07-27 PROBLEM — I21.4 NSTEMI (NON-ST ELEVATED MYOCARDIAL INFARCTION): Status: ACTIVE | Noted: 2022-07-27

## 2022-07-27 PROBLEM — R33.9 URINARY RETENTION: Status: ACTIVE | Noted: 2022-07-27

## 2022-07-27 PROBLEM — I16.1 HYPERTENSIVE EMERGENCY: Status: ACTIVE | Noted: 2022-07-27

## 2022-07-27 PROBLEM — N17.9 ACUTE RENAL FAILURE: Status: RESOLVED | Noted: 2022-07-27 | Resolved: 2022-07-27

## 2022-07-27 PROBLEM — N28.89 RENAL MASS: Status: ACTIVE | Noted: 2022-07-27

## 2022-07-27 PROBLEM — N17.9 ACUTE RENAL FAILURE: Status: ACTIVE | Noted: 2022-07-27

## 2022-07-27 PROBLEM — A41.9 SEPTIC SHOCK: Status: ACTIVE | Noted: 2022-07-27

## 2022-07-27 PROBLEM — N17.9 AKI (ACUTE KIDNEY INJURY): Status: ACTIVE | Noted: 2022-07-27

## 2022-07-27 PROCEDURE — 3046F HEMOGLOBIN A1C LEVEL >9.0%: CPT | Mod: CPTII,S$GLB,, | Performed by: INTERNAL MEDICINE

## 2022-07-27 PROCEDURE — 3060F PR POS MICROALBUMINURIA RESULT DOCUMENTED/REVIEW: ICD-10-PCS | Mod: CPTII,S$GLB,, | Performed by: INTERNAL MEDICINE

## 2022-07-27 PROCEDURE — 4010F PR ACE/ARB THEARPY RXD/TAKEN: ICD-10-PCS | Mod: CPTII,S$GLB,, | Performed by: INTERNAL MEDICINE

## 2022-07-27 PROCEDURE — 3046F PR MOST RECENT HEMOGLOBIN A1C LEVEL > 9.0%: ICD-10-PCS | Mod: CPTII,S$GLB,, | Performed by: INTERNAL MEDICINE

## 2022-07-27 PROCEDURE — 3008F BODY MASS INDEX DOCD: CPT | Mod: CPTII,S$GLB,, | Performed by: INTERNAL MEDICINE

## 2022-07-27 PROCEDURE — 3077F PR MOST RECENT SYSTOLIC BLOOD PRESSURE >= 140 MM HG: ICD-10-PCS | Mod: CPTII,S$GLB,, | Performed by: INTERNAL MEDICINE

## 2022-07-27 PROCEDURE — 1159F MED LIST DOCD IN RCRD: CPT | Mod: CPTII,S$GLB,, | Performed by: INTERNAL MEDICINE

## 2022-07-27 PROCEDURE — 3008F PR BODY MASS INDEX (BMI) DOCUMENTED: ICD-10-PCS | Mod: CPTII,S$GLB,, | Performed by: INTERNAL MEDICINE

## 2022-07-27 PROCEDURE — 3060F POS MICROALBUMINURIA REV: CPT | Mod: CPTII,S$GLB,, | Performed by: INTERNAL MEDICINE

## 2022-07-27 PROCEDURE — 3077F SYST BP >= 140 MM HG: CPT | Mod: CPTII,S$GLB,, | Performed by: INTERNAL MEDICINE

## 2022-07-27 PROCEDURE — 1159F PR MEDICATION LIST DOCUMENTED IN MEDICAL RECORD: ICD-10-PCS | Mod: CPTII,S$GLB,, | Performed by: INTERNAL MEDICINE

## 2022-07-27 PROCEDURE — 3066F PR DOCUMENTATION OF TREATMENT FOR NEPHROPATHY: ICD-10-PCS | Mod: CPTII,S$GLB,, | Performed by: INTERNAL MEDICINE

## 2022-07-27 PROCEDURE — 4010F ACE/ARB THERAPY RXD/TAKEN: CPT | Mod: CPTII,S$GLB,, | Performed by: INTERNAL MEDICINE

## 2022-07-27 PROCEDURE — 3078F PR MOST RECENT DIASTOLIC BLOOD PRESSURE < 80 MM HG: ICD-10-PCS | Mod: CPTII,S$GLB,, | Performed by: INTERNAL MEDICINE

## 2022-07-27 PROCEDURE — 3066F NEPHROPATHY DOC TX: CPT | Mod: CPTII,S$GLB,, | Performed by: INTERNAL MEDICINE

## 2022-07-27 PROCEDURE — 99214 OFFICE O/P EST MOD 30 MIN: CPT | Mod: S$GLB,,, | Performed by: INTERNAL MEDICINE

## 2022-07-27 PROCEDURE — 99214 PR OFFICE/OUTPT VISIT, EST, LEVL IV, 30-39 MIN: ICD-10-PCS | Mod: S$GLB,,, | Performed by: INTERNAL MEDICINE

## 2022-07-27 PROCEDURE — 3078F DIAST BP <80 MM HG: CPT | Mod: CPTII,S$GLB,, | Performed by: INTERNAL MEDICINE

## 2022-07-27 RX ORDER — OMEPRAZOLE 40 MG/1
40 CAPSULE, DELAYED RELEASE ORAL DAILY
Qty: 30 CAPSULE | Refills: 2 | Status: SHIPPED | OUTPATIENT
Start: 2022-07-27 | End: 2022-08-11 | Stop reason: ALTCHOICE

## 2022-07-27 RX ORDER — LEVOFLOXACIN 500 MG/1
500 TABLET, FILM COATED ORAL DAILY
COMMUNITY
Start: 2022-07-23 | End: 2022-08-11 | Stop reason: ALTCHOICE

## 2022-07-27 RX ORDER — NAPROXEN SODIUM 220 MG/1
81 TABLET, FILM COATED ORAL DAILY
Qty: 90 TABLET | Refills: 2 | Status: SHIPPED | OUTPATIENT
Start: 2022-07-27 | End: 2022-08-11 | Stop reason: SDUPTHER

## 2022-07-27 RX ORDER — INSULIN GLARGINE 100 [IU]/ML
20 INJECTION, SOLUTION SUBCUTANEOUS 2 TIMES DAILY
Qty: 2 EACH | Refills: 5 | Status: SHIPPED | OUTPATIENT
Start: 2022-07-27 | End: 2022-08-11 | Stop reason: SDUPTHER

## 2022-07-27 RX ORDER — HYDRALAZINE HYDROCHLORIDE 50 MG/1
50 TABLET, FILM COATED ORAL 2 TIMES DAILY
COMMUNITY
Start: 2022-07-16 | End: 2022-08-02

## 2022-07-27 RX ORDER — TAMSULOSIN HYDROCHLORIDE 0.4 MG/1
1 CAPSULE ORAL DAILY
COMMUNITY
Start: 2022-07-16 | End: 2022-09-14

## 2022-07-27 RX ORDER — METOCLOPRAMIDE 10 MG/1
10 TABLET ORAL
COMMUNITY
Start: 2022-07-16 | End: 2023-01-11

## 2022-07-27 NOTE — PROGRESS NOTES
Subjective:      Patient ID: Radha Hoffman is a 62 y.o. female.    Chief Complaint: Hospital Follow Up and Bowel Incontinence (Pt is going and not knowing since the hosp stay....  )     Patient with h/o DM with last A1c-= 9.4 improved from 14.  Patient reports better adherence with diet and medication.  Patient is taking Lantus 20 units q.h.s. and Humalog sliding scale Home BS Are rnning between 100-200. Patient is just discharged from hospital and not many BS readings are available.     Patient was recently admitted to [facility] secondary to non ST elevation MI.   CTA was positive for RCA/lad disease along with 4 vessel disease.  Angiogram was done by Dr. Barrios, that showed moderate 3 vessel coronary artery disease.  No stents were placed and medical management was recommended.  Patient is on aspirin, beta blocker, ARB, statin.      Patient was recently admitted to [facility] secondary to urosepsis.  Patient was found unconscious in her home and was taken to ER where the blood pressures were quite low and was admitted in ICU with pyelonephritis.  Patient was also found to have acute renal injury and was treated with IV antibiotics and aggressive hydration.  During that hospital cyst CT head was negative.  She was found to have a superficial waned DVT and went T coagulation was recommended.  During the 1st hospital admission as well hemoglobin was low reaching 7.2 and received 1 unit of PRBC Patient remain admitted for 21 days and the kidney function improved.  Patient was discharged but developed fever + generalized swelling within 24 hours of discharge and was admitted again.  Patient was found to have UTI  + Pneumonia and Volume Overload.  Patient was also found to have acute on chronic blood loss with hemoglobin of 7.3 and had 1 unit of blood. Patient remained hospitalized for 8 days and was treated with IV antibiotics and diuretics.  Patient was discharged 2 days  ago         Review of Systems   Constitutional: Positive for weight loss (11 lbs weight gain ). Negative for chills, diaphoresis, fever and malaise/fatigue.   HENT: Negative for congestion, ear pain, sinus pain, sore throat and tinnitus.    Eyes: Negative for blurred vision and photophobia.   Respiratory: Negative for cough, hemoptysis, shortness of breath and wheezing.    Cardiovascular: Negative for chest pain, palpitations, orthopnea, leg swelling and PND.   Gastrointestinal: Negative for abdominal pain, blood in stool, constipation, diarrhea, heartburn, melena, nausea and vomiting.   Genitourinary: Positive for frequency. Negative for dysuria and urgency.   Musculoskeletal: Negative for back pain, myalgias and neck pain.   Skin: Negative for rash.   Neurological: Negative for dizziness, tremors, seizures, loss of consciousness and weakness.   Endo/Heme/Allergies: Positive for polydipsia.   Psychiatric/Behavioral: Negative for depression and hallucinations. The patient does not have insomnia.      Objective:     Physical Exam  Constitutional:       General: She is not in acute distress.     Appearance: She is not diaphoretic.      Comments: Feels weak.   Neck:      Thyroid: No thyromegaly.   Cardiovascular:      Rate and Rhythm: Normal rate and regular rhythm.      Heart sounds: Normal heart sounds. No murmur heard.  Pulmonary:      Effort: Pulmonary effort is normal. No respiratory distress.      Breath sounds: Normal breath sounds. No wheezing.      Comments: Bilateral rib pain as well.   No pain when patient is distracted.  Abdominal:      General: Bowel sounds are normal. There is no distension.      Palpations: Abdomen is soft.      Tenderness: There is no abdominal tenderness (Epigastric tenderness).   Musculoskeletal:      Right lower leg: Edema (2+ upto Knee) present.      Left lower leg: Edema (2+ upto knee) present.   Lymphadenopathy:      Cervical: No cervical adenopathy.   Neurological:      Mental  Status: She is alert and oriented to person, place, and time.   Psychiatric:         Behavior: Behavior normal.         Thought Content: Thought content normal.         Judgment: Judgment normal.       Assessment:       ICD-10-CM ICD-9-CM   1. Coronary artery disease of native artery of native heart with stable angina pectoris  I25.118 414.01     413.9   2. Type 2 diabetes mellitus with diabetic polyneuropathy, with long-term current use of insulin  E11.42 250.60    Z79.4 357.2     V58.67   3. Epigastric abdominal pain  R10.13 789.06   4. Localized edema  R60.0 782.3       Plan:     Patient last A1c of 9.4 much improved from 14.  Patient is taking Lantus 20 units and sliding scale  Patient has freestyle Prasanna...  Advised to monitor blood sugar at home aggressively and bring log  Patient blood pressures are still running high..  Patient reports compliance with medication  Patient compliance with medication is quite questionable  No patient is living with her daughter and we are expecting better compliance with medication  Patient has bilateral leg swelling and was recently admitted with with volume overload  Will increase Lasix to 80 mg in the morning.   Will add potassium 20 mEq in the morning with Lasix  Patient had an appointment for echocardiogram but it appears that she has missed appointment  Patient has appointment with cardiologist on 2nd August.  Advised patient to keep appointment  Patient has symptoms suggestive of gastroparesis and gastric emptying study confirmed the diagnosis  Patient also had EGD that suggest hiatal hernia and stomach ulcer.  Both are contributing to patient epigastric pain.  Patient is on omeprazole with some help  Patient is also given Reglan to increased gut motility.  Advised patient to have small frequent meals  Take Reglan only if needed  Patient has coronary artery disease and is being followed by Dr. Barrios.  Aggressive blood pressure, cholesterol, diabetes control is  "recommended  Patient is on statin, isosorbide, metoprolol, losartan, aspirin  Was patient start taking blood pressure medication regularly and if the blood pressure still stay high  Will change metoprolol to carvedilol and increase hydralazine to 100 mg  Will repeat lab work  Patient has prolonged admission and is undergoing physical therapy.  Advised patient to keep appointment  Patient to come to clinic in 5 days for blood pressure check + report appointment in 2 weeks.      Medication List with Changes/Refills   Current Medications    ATORVASTATIN (LIPITOR) 40 MG TABLET    Take 1 tablet (40 mg total) by mouth once daily.    BD VEO INSULIN SYRINGE UF 1 ML 31 GAUGE X 15/64" SYRG    USE AS DIRECTED    BLOOD SUGAR DIAGNOSTIC (ONETOUCH ULTRA TEST) [facility]    CHECK BLOOD GLUCOSE 3 TIMES A DAY    BLOOD-GLUCOSE METER KIT    Use as instructed    DULOXETINE (CYMBALTA) 30 MG CAPSULE    Take 30 mg by mouth once daily.    FLASH GLUCOSE SCANNING READER (FREESTYLE CRISTOBAL 2 READER) MISC    1 each by Misc.(Non-Drug; Combo Route) route once daily at 6am.    FLASH GLUCOSE SENSOR (FREESTYLE CRISTOBAL 2 SENSOR) KIT    1 each by Misc.(Non-Drug; Combo Route) route every 14 (fourteen) days.    FUROSEMIDE (LASIX) 40 MG TABLET    Take 1 tablet (40 mg total) by mouth once daily.    GABAPENTIN (NEURONTIN) 300 MG CAPSULE    Take 1 capsule (300 mg total) by mouth 3 (three) times daily.    HUMALOG KWIKPEN INSULIN 100 UNIT/ML PEN    16 units before breakfast and lunch and 20 units before dinner.    HYDRALAZINE (APRESOLINE) 50 MG TABLET    Take 50 mg by mouth 2 (two) times daily.    ISOSORBIDE MONONITRATE (IMDUR) 30 MG 24 HR TABLET    Take 1 tablet (30 mg total) by mouth once daily.    LANCETS 32 GAUGE MISC    1 lancet by Misc.(Non-Drug; Combo Route) route 3 (three) times daily.    LEVOFLOXACIN (LEVAQUIN) 500 MG TABLET    Take 500 mg by mouth once daily.    LOSARTAN (COZAAR) 100 MG TABLET    TAKE 1 TABLET BY MOUTH ONCE DAILY FOR 30 DAYS    " "METOCLOPRAMIDE HCL (REGLAN) 10 MG TABLET    Take 10 mg by mouth 4 (four) times daily before meals and nightly.    METOPROLOL TARTRATE (LOPRESSOR) 25 MG TABLET    50 mg 2 (two) times daily.    MIRTAZAPINE (REMERON) 15 MG TABLET    Take 15 mg by mouth every evening.    MONTELUKAST (SINGULAIR) 10 MG TABLET    TAKE 1 TABLET BY MOUTH ONCE DAILY FOR 30 DAYS    PEN NEEDLE, DIABETIC (BD ULTRA-FINE SHORT PEN NEEDLE) 31 GAUGE X 5/16" NDLE    Use 1 needle 5 times/day    TAMSULOSIN (FLOMAX) 0.4 MG CAP    Take 1 capsule by mouth once daily.    TIMOLOL MALEATE 0.5% (TIMOPTIC) 0.5 % DROP    1 drop 2 (two) times daily.   Changed and/or Refilled Medications    Modified Medication Previous Medication    ASPIRIN 81 MG CHEW aspirin 81 MG Chew       Take 1 tablet (81 mg total) by mouth once daily.    81 mg.    INSULIN (LANTUS SOLOSTAR U-100 INSULIN) GLARGINE 100 UNITS/ML SUBQ PEN insulin (LANTUS SOLOSTAR U-100 INSULIN) glargine 100 units/mL (3mL) SubQ pen       Inject 20 Units into the skin 2 (two) times a day.    Inject 20 Units into the skin 2 (two) times a day.    OMEPRAZOLE (PRILOSEC) 40 MG CAPSULE omeprazole (PRILOSEC) 40 MG capsule       Take 1 capsule (40 mg total) by mouth once daily.    Take 1 capsule (40 mg total) by mouth once daily.   Discontinued Medications    TRAMADOL (ULTRAM) 50 MG TABLET    Take 1 tablet (50 mg total) by mouth every 6 (six) hours.                                "

## 2022-07-29 LAB
ALBUMIN SERPL-MCNC: 3.2 G/DL (ref 3.6–5.1)
ALBUMIN/GLOB SERPL: 0.8 (CALC) (ref 1–2.5)
ALP SERPL-CCNC: 180 U/L (ref 37–153)
ALT SERPL-CCNC: 17 U/L (ref 6–29)
AST SERPL-CCNC: 19 U/L (ref 10–35)
BASOPHILS # BLD AUTO: 32 CELLS/UL (ref 0–200)
BASOPHILS NFR BLD AUTO: 0.4 %
BILIRUB SERPL-MCNC: 0.4 MG/DL (ref 0.2–1.2)
BNP SERPL-MCNC: 529 PG/ML
BUN SERPL-MCNC: 18 MG/DL (ref 7–25)
BUN/CREAT SERPL: 13 (CALC) (ref 6–22)
CALCIUM SERPL-MCNC: 9.2 MG/DL (ref 8.6–10.4)
CHLORIDE SERPL-SCNC: 107 MMOL/L (ref 98–110)
CHOLEST SERPL-MCNC: 85 MG/DL
CHOLEST/HDLC SERPL: 5.3 (CALC)
CO2 SERPL-SCNC: 28 MMOL/L (ref 20–32)
CREAT SERPL-MCNC: 1.43 MG/DL (ref 0.5–1.05)
EGFR: 41 ML/MIN/1.73M2
EOSINOPHIL # BLD AUTO: 72 CELLS/UL (ref 15–500)
EOSINOPHIL NFR BLD AUTO: 0.9 %
ERYTHROCYTE [DISTWIDTH] IN BLOOD BY AUTOMATED COUNT: 14.3 % (ref 11–15)
GLOBULIN SER CALC-MCNC: 4 G/DL (CALC) (ref 1.9–3.7)
GLUCOSE SERPL-MCNC: 153 MG/DL (ref 65–99)
HCT VFR BLD AUTO: 26.8 % (ref 35–45)
HDLC SERPL-MCNC: 16 MG/DL
HGB BLD-MCNC: 8.6 G/DL (ref 11.7–15.5)
LDLC SERPL CALC-MCNC: 44 MG/DL (CALC)
LYMPHOCYTES # BLD AUTO: 2024 CELLS/UL (ref 850–3900)
LYMPHOCYTES NFR BLD AUTO: 25.3 %
MCH RBC QN AUTO: 27.8 PG (ref 27–33)
MCHC RBC AUTO-ENTMCNC: 32.1 G/DL (ref 32–36)
MCV RBC AUTO: 86.7 FL (ref 80–100)
MONOCYTES # BLD AUTO: 416 CELLS/UL (ref 200–950)
MONOCYTES NFR BLD AUTO: 5.2 %
NEUTROPHILS # BLD AUTO: 5456 CELLS/UL (ref 1500–7800)
NEUTROPHILS NFR BLD AUTO: 68.2 %
NONHDLC SERPL-MCNC: 69 MG/DL (CALC)
PLATELET # BLD AUTO: 448 THOUSAND/UL (ref 140–400)
PMV BLD REES-ECKER: 9.8 FL (ref 7.5–12.5)
POTASSIUM SERPL-SCNC: 4 MMOL/L (ref 3.5–5.3)
PROT SERPL-MCNC: 7.2 G/DL (ref 6.1–8.1)
RBC # BLD AUTO: 3.09 MILLION/UL (ref 3.8–5.1)
SODIUM SERPL-SCNC: 144 MMOL/L (ref 135–146)
TRIGL SERPL-MCNC: 167 MG/DL
TSH SERPL-ACNC: 1.32 MIU/L (ref 0.4–4.5)
WBC # BLD AUTO: 8 THOUSAND/UL (ref 3.8–10.8)

## 2022-08-02 ENCOUNTER — CLINICAL SUPPORT (OUTPATIENT)
Dept: PRIMARY CARE CLINIC | Facility: CLINIC | Age: 62
End: 2022-08-02
Payer: MEDICAID

## 2022-08-02 VITALS — HEART RATE: 61 BPM | DIASTOLIC BLOOD PRESSURE: 73 MMHG | SYSTOLIC BLOOD PRESSURE: 180 MMHG

## 2022-08-02 DIAGNOSIS — D64.9 ANEMIA, UNSPECIFIED TYPE: Primary | ICD-10-CM

## 2022-08-02 DIAGNOSIS — R60.9 EDEMA, UNSPECIFIED TYPE: ICD-10-CM

## 2022-08-02 DIAGNOSIS — I10 BENIGN ESSENTIAL HTN: Primary | ICD-10-CM

## 2022-08-02 PROCEDURE — 99211 PR OFFICE/OUTPT VISIT, EST, LEVL I: ICD-10-PCS | Mod: S$GLB,,, | Performed by: INTERNAL MEDICINE

## 2022-08-02 PROCEDURE — 99211 OFF/OP EST MAY X REQ PHY/QHP: CPT | Mod: S$GLB,,, | Performed by: INTERNAL MEDICINE

## 2022-08-02 RX ORDER — POTASSIUM CHLORIDE 20 MEQ/1
20 TABLET, EXTENDED RELEASE ORAL 2 TIMES DAILY
Qty: 60 TABLET | Refills: 2 | Status: SHIPPED | OUTPATIENT
Start: 2022-08-02 | End: 2023-02-01

## 2022-08-02 RX ORDER — HYDRALAZINE HYDROCHLORIDE 100 MG/1
100 TABLET, FILM COATED ORAL 2 TIMES DAILY
Qty: 60 TABLET | Refills: 11 | Status: SHIPPED | OUTPATIENT
Start: 2022-08-02 | End: 2022-10-19

## 2022-08-02 NOTE — PROGRESS NOTES
Blood pressure still running high.  Patient reports compliance with medication  Will increase hydralazine to 100 mg  Patient still has edema and weighs the same..  Will increase Lasix to 80 mg in the morning  Will prescribe potassium 20 mEq twice a day

## 2022-08-11 ENCOUNTER — OFFICE VISIT (OUTPATIENT)
Dept: UROLOGY | Facility: CLINIC | Age: 62
End: 2022-08-11
Payer: MEDICAID

## 2022-08-11 ENCOUNTER — OFFICE VISIT (OUTPATIENT)
Dept: PRIMARY CARE CLINIC | Facility: CLINIC | Age: 62
End: 2022-08-11
Payer: MEDICAID

## 2022-08-11 ENCOUNTER — TELEPHONE (OUTPATIENT)
Dept: PRIMARY CARE CLINIC | Facility: CLINIC | Age: 62
End: 2022-08-11

## 2022-08-11 VITALS
OXYGEN SATURATION: 96 % | WEIGHT: 139 LBS | HEART RATE: 57 BPM | DIASTOLIC BLOOD PRESSURE: 64 MMHG | BODY MASS INDEX: 28.02 KG/M2 | RESPIRATION RATE: 16 BRPM | TEMPERATURE: 97 F | HEIGHT: 59 IN | SYSTOLIC BLOOD PRESSURE: 132 MMHG

## 2022-08-11 VITALS
DIASTOLIC BLOOD PRESSURE: 64 MMHG | HEART RATE: 61 BPM | BODY MASS INDEX: 28.22 KG/M2 | SYSTOLIC BLOOD PRESSURE: 122 MMHG | WEIGHT: 140 LBS | HEIGHT: 59 IN | RESPIRATION RATE: 16 BRPM

## 2022-08-11 DIAGNOSIS — Z79.4 TYPE 2 DIABETES MELLITUS WITH DIABETIC POLYNEUROPATHY, WITH LONG-TERM CURRENT USE OF INSULIN: ICD-10-CM

## 2022-08-11 DIAGNOSIS — E11.42 TYPE 2 DIABETES MELLITUS WITH DIABETIC POLYNEUROPATHY, WITH LONG-TERM CURRENT USE OF INSULIN: ICD-10-CM

## 2022-08-11 DIAGNOSIS — D64.9 ANEMIA, UNSPECIFIED TYPE: Primary | ICD-10-CM

## 2022-08-11 DIAGNOSIS — I10 BENIGN ESSENTIAL HTN: ICD-10-CM

## 2022-08-11 DIAGNOSIS — I25.118 CORONARY ARTERY DISEASE OF NATIVE ARTERY OF NATIVE HEART WITH STABLE ANGINA PECTORIS: ICD-10-CM

## 2022-08-11 DIAGNOSIS — R33.9 URINARY RETENTION: Primary | ICD-10-CM

## 2022-08-11 LAB — POC RESIDUAL URINE VOLUME: 7 ML (ref 0–100)

## 2022-08-11 PROCEDURE — 3078F DIAST BP <80 MM HG: CPT | Mod: CPTII,S$GLB,, | Performed by: INTERNAL MEDICINE

## 2022-08-11 PROCEDURE — 3008F PR BODY MASS INDEX (BMI) DOCUMENTED: ICD-10-PCS | Mod: CPTII,S$GLB,, | Performed by: NURSE PRACTITIONER

## 2022-08-11 PROCEDURE — 3078F PR MOST RECENT DIASTOLIC BLOOD PRESSURE < 80 MM HG: ICD-10-PCS | Mod: CPTII,S$GLB,, | Performed by: NURSE PRACTITIONER

## 2022-08-11 PROCEDURE — 3046F HEMOGLOBIN A1C LEVEL >9.0%: CPT | Mod: CPTII,S$GLB,, | Performed by: NURSE PRACTITIONER

## 2022-08-11 PROCEDURE — 4010F PR ACE/ARB THEARPY RXD/TAKEN: ICD-10-PCS | Mod: CPTII,S$GLB,, | Performed by: INTERNAL MEDICINE

## 2022-08-11 PROCEDURE — 3066F PR DOCUMENTATION OF TREATMENT FOR NEPHROPATHY: ICD-10-PCS | Mod: CPTII,S$GLB,, | Performed by: INTERNAL MEDICINE

## 2022-08-11 PROCEDURE — 4010F ACE/ARB THERAPY RXD/TAKEN: CPT | Mod: CPTII,S$GLB,, | Performed by: INTERNAL MEDICINE

## 2022-08-11 PROCEDURE — 3066F NEPHROPATHY DOC TX: CPT | Mod: CPTII,S$GLB,, | Performed by: INTERNAL MEDICINE

## 2022-08-11 PROCEDURE — 99203 PR OFFICE/OUTPT VISIT, NEW, LEVL III, 30-44 MIN: ICD-10-PCS | Mod: S$GLB,,, | Performed by: NURSE PRACTITIONER

## 2022-08-11 PROCEDURE — 3046F PR MOST RECENT HEMOGLOBIN A1C LEVEL > 9.0%: ICD-10-PCS | Mod: CPTII,S$GLB,, | Performed by: NURSE PRACTITIONER

## 2022-08-11 PROCEDURE — 3046F PR MOST RECENT HEMOGLOBIN A1C LEVEL > 9.0%: ICD-10-PCS | Mod: CPTII,S$GLB,, | Performed by: INTERNAL MEDICINE

## 2022-08-11 PROCEDURE — 3075F PR MOST RECENT SYSTOLIC BLOOD PRESS GE 130-139MM HG: ICD-10-PCS | Mod: CPTII,S$GLB,, | Performed by: INTERNAL MEDICINE

## 2022-08-11 PROCEDURE — 3008F BODY MASS INDEX DOCD: CPT | Mod: CPTII,S$GLB,, | Performed by: INTERNAL MEDICINE

## 2022-08-11 PROCEDURE — 1160F RVW MEDS BY RX/DR IN RCRD: CPT | Mod: CPTII,S$GLB,, | Performed by: INTERNAL MEDICINE

## 2022-08-11 PROCEDURE — 3060F POS MICROALBUMINURIA REV: CPT | Mod: CPTII,S$GLB,, | Performed by: INTERNAL MEDICINE

## 2022-08-11 PROCEDURE — 1159F PR MEDICATION LIST DOCUMENTED IN MEDICAL RECORD: ICD-10-PCS | Mod: CPTII,S$GLB,, | Performed by: INTERNAL MEDICINE

## 2022-08-11 PROCEDURE — 1159F MED LIST DOCD IN RCRD: CPT | Mod: CPTII,S$GLB,, | Performed by: NURSE PRACTITIONER

## 2022-08-11 PROCEDURE — 3066F NEPHROPATHY DOC TX: CPT | Mod: CPTII,S$GLB,, | Performed by: NURSE PRACTITIONER

## 2022-08-11 PROCEDURE — 3046F HEMOGLOBIN A1C LEVEL >9.0%: CPT | Mod: CPTII,S$GLB,, | Performed by: INTERNAL MEDICINE

## 2022-08-11 PROCEDURE — 3008F PR BODY MASS INDEX (BMI) DOCUMENTED: ICD-10-PCS | Mod: CPTII,S$GLB,, | Performed by: INTERNAL MEDICINE

## 2022-08-11 PROCEDURE — 4010F ACE/ARB THERAPY RXD/TAKEN: CPT | Mod: CPTII,S$GLB,, | Performed by: NURSE PRACTITIONER

## 2022-08-11 PROCEDURE — 1160F RVW MEDS BY RX/DR IN RCRD: CPT | Mod: CPTII,S$GLB,, | Performed by: NURSE PRACTITIONER

## 2022-08-11 PROCEDURE — 1160F PR REVIEW ALL MEDS BY PRESCRIBER/CLIN PHARMACIST DOCUMENTED: ICD-10-PCS | Mod: CPTII,S$GLB,, | Performed by: NURSE PRACTITIONER

## 2022-08-11 PROCEDURE — 3060F PR POS MICROALBUMINURIA RESULT DOCUMENTED/REVIEW: ICD-10-PCS | Mod: CPTII,S$GLB,, | Performed by: INTERNAL MEDICINE

## 2022-08-11 PROCEDURE — 1159F MED LIST DOCD IN RCRD: CPT | Mod: CPTII,S$GLB,, | Performed by: INTERNAL MEDICINE

## 2022-08-11 PROCEDURE — 4010F PR ACE/ARB THEARPY RXD/TAKEN: ICD-10-PCS | Mod: CPTII,S$GLB,, | Performed by: NURSE PRACTITIONER

## 2022-08-11 PROCEDURE — 1159F PR MEDICATION LIST DOCUMENTED IN MEDICAL RECORD: ICD-10-PCS | Mod: CPTII,S$GLB,, | Performed by: NURSE PRACTITIONER

## 2022-08-11 PROCEDURE — 3060F PR POS MICROALBUMINURIA RESULT DOCUMENTED/REVIEW: ICD-10-PCS | Mod: CPTII,S$GLB,, | Performed by: NURSE PRACTITIONER

## 2022-08-11 PROCEDURE — 51798 US URINE CAPACITY MEASURE: CPT | Mod: S$GLB,,, | Performed by: NURSE PRACTITIONER

## 2022-08-11 PROCEDURE — 3074F SYST BP LT 130 MM HG: CPT | Mod: CPTII,S$GLB,, | Performed by: NURSE PRACTITIONER

## 2022-08-11 PROCEDURE — 1160F PR REVIEW ALL MEDS BY PRESCRIBER/CLIN PHARMACIST DOCUMENTED: ICD-10-PCS | Mod: CPTII,S$GLB,, | Performed by: INTERNAL MEDICINE

## 2022-08-11 PROCEDURE — 3074F PR MOST RECENT SYSTOLIC BLOOD PRESSURE < 130 MM HG: ICD-10-PCS | Mod: CPTII,S$GLB,, | Performed by: NURSE PRACTITIONER

## 2022-08-11 PROCEDURE — 3078F PR MOST RECENT DIASTOLIC BLOOD PRESSURE < 80 MM HG: ICD-10-PCS | Mod: CPTII,S$GLB,, | Performed by: INTERNAL MEDICINE

## 2022-08-11 PROCEDURE — 51798 POCT BLADDER SCAN: ICD-10-PCS | Mod: S$GLB,,, | Performed by: NURSE PRACTITIONER

## 2022-08-11 PROCEDURE — 3008F BODY MASS INDEX DOCD: CPT | Mod: CPTII,S$GLB,, | Performed by: NURSE PRACTITIONER

## 2022-08-11 PROCEDURE — 3078F DIAST BP <80 MM HG: CPT | Mod: CPTII,S$GLB,, | Performed by: NURSE PRACTITIONER

## 2022-08-11 PROCEDURE — 99203 OFFICE O/P NEW LOW 30 MIN: CPT | Mod: S$GLB,,, | Performed by: NURSE PRACTITIONER

## 2022-08-11 PROCEDURE — 3075F SYST BP GE 130 - 139MM HG: CPT | Mod: CPTII,S$GLB,, | Performed by: INTERNAL MEDICINE

## 2022-08-11 PROCEDURE — 3060F POS MICROALBUMINURIA REV: CPT | Mod: CPTII,S$GLB,, | Performed by: NURSE PRACTITIONER

## 2022-08-11 PROCEDURE — 3066F PR DOCUMENTATION OF TREATMENT FOR NEPHROPATHY: ICD-10-PCS | Mod: CPTII,S$GLB,, | Performed by: NURSE PRACTITIONER

## 2022-08-11 PROCEDURE — 99214 PR OFFICE/OUTPT VISIT, EST, LEVL IV, 30-39 MIN: ICD-10-PCS | Mod: S$GLB,,, | Performed by: INTERNAL MEDICINE

## 2022-08-11 PROCEDURE — 99214 OFFICE O/P EST MOD 30 MIN: CPT | Mod: S$GLB,,, | Performed by: INTERNAL MEDICINE

## 2022-08-11 RX ORDER — INSULIN GLARGINE 100 [IU]/ML
20 INJECTION, SOLUTION SUBCUTANEOUS 2 TIMES DAILY
Qty: 36 ML | Refills: 3 | Status: SHIPPED | OUTPATIENT
Start: 2022-08-11 | End: 2023-06-28 | Stop reason: SDUPTHER

## 2022-08-11 RX ORDER — NAPROXEN SODIUM 220 MG/1
TABLET, FILM COATED ORAL
COMMUNITY
Start: 2022-07-27 | End: 2023-05-24 | Stop reason: SDUPTHER

## 2022-08-11 RX ORDER — LOPERAMIDE HYDROCHLORIDE 2 MG/1
2 CAPSULE ORAL 4 TIMES DAILY PRN
COMMUNITY
End: 2023-04-17 | Stop reason: ALTCHOICE

## 2022-08-11 RX ORDER — ACETAMINOPHEN 500 MG
500 TABLET ORAL EVERY 6 HOURS PRN
COMMUNITY

## 2022-08-11 RX ORDER — GUAIFENESIN 400 MG/1
400 TABLET ORAL
COMMUNITY
End: 2022-09-14

## 2022-08-11 NOTE — PROGRESS NOTES
"Subjective:       Patient ID: Radha Hoffman is a 62 y.o. female.    Chief Complaint: Urinary Tract Infection      HPI: 62-year-old female new to the service referred for evaluation.  She was recently in the hospital diagnosed with septic shock and aspiration pneumonia which was believed to be secondary to initially starting his UTI.  Daughter is present and states that there were no outward symptoms i.e. dysuria frequency urgency incontinence or gross hematuria.  They were shopping when the woman fell ill acutely and brought to the hospital.  Since hospital discharge she remains asymptomatic denying again dysuria, frequency, urgency, incontinence or gross hematuria.  Bowels are working well.  She has been afebrile.  Appetite is good.  Strength is good.       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 Abdalla    Stomach ulcer     Urinary retention        Past Surgical Historical:   Past Surgical History:   Procedure Laterality Date    CATARACT EXTRACTION, BILATERAL      Catherization of heart      endo scopy      HYSTERECTOMY          Medications:   Medication List with Changes/Refills   Current Medications    ACETAMINOPHEN (TYLENOL) 500 MG TABLET    Take 500 mg by mouth every 6 (six) hours as needed for Pain.    ASPIRIN 81 MG CHEW    CHEW AND SWALLOW 1 TABLET BY MOUTH ONCE DAILY    ATORVASTATIN (LIPITOR) 40 MG TABLET    Take 1 tablet (40 mg total) by mouth once daily.    BD VEO INSULIN SYRINGE UF 1 ML 31 GAUGE X 15/64" SYRG    USE AS DIRECTED    BLOOD SUGAR DIAGNOSTIC (ONETOUCH ULTRA TEST) [facility]    CHECK BLOOD GLUCOSE " 3 TIMES A DAY    BLOOD-GLUCOSE METER KIT    Use as instructed    DULOXETINE (CYMBALTA) 30 MG CAPSULE    Take 30 mg by mouth once daily.    FLASH GLUCOSE SCANNING READER (FREESTYLE CRISTOBAL 2 READER) MISC    1 each by Misc.(Non-Drug; Combo Route) route once daily at 6am.    FLASH GLUCOSE SENSOR (FREESTYLE CRISTOBAL 2 SENSOR) KIT    1 each by Misc.(Non-Drug; Combo Route) route every 14 (fourteen) days.    FUROSEMIDE (LASIX) 40 MG TABLET    Take 1 tablet (40 mg total) by mouth once daily.    GABAPENTIN (NEURONTIN) 300 MG CAPSULE    TAKE 1 CAPSULE BY MOUTH THREE TIMES DAILY    GUAIFENESIN (HUMIBID E) 400 MG TAB    Take 400 mg by mouth.    HUMALOG KWIKPEN INSULIN 100 UNIT/ML PEN    16 units before breakfast and lunch and 20 units before dinner.    HYDRALAZINE (APRESOLINE) 100 MG TABLET    Take 1 tablet (100 mg total) by mouth 2 (two) times daily.    INSULIN (LANTUS SOLOSTAR U-100 INSULIN) GLARGINE 100 UNITS/ML SUBQ PEN    Inject 20 Units into the skin 2 (two) times a day.    ISOSORBIDE MONONITRATE (IMDUR) 30 MG 24 HR TABLET    Take 1 tablet (30 mg total) by mouth once daily.    LANCETS 32 GAUGE MISC    1 lancet by Misc.(Non-Drug; Combo Route) route 3 (three) times daily.    LEVOFLOXACIN (LEVAQUIN) 500 MG TABLET    Take 500 mg by mouth once daily.    LOPERAMIDE (IMODIUM) 2 MG CAPSULE    Take 2 mg by mouth 4 (four) times daily as needed for Diarrhea.    LORATADINE 10 MG CAP    1 tablet    LOSARTAN (COZAAR) 100 MG TABLET    TAKE 1 TABLET BY MOUTH ONCE DAILY FOR 30 DAYS    METOCLOPRAMIDE HCL (REGLAN) 10 MG TABLET    Take 10 mg by mouth 4 (four) times daily before meals and nightly.    METOPROLOL TARTRATE (LOPRESSOR) 50 MG TABLET    50 mg 2 (two) times daily.    MIRTAZAPINE (REMERON) 15 MG TABLET    Take 15 mg by mouth every evening.    MONTELUKAST (SINGULAIR) 10 MG TABLET    TAKE 1 TABLET BY MOUTH ONCE DAILY FOR 30 DAYS    OMEPRAZOLE (PRILOSEC) 40 MG CAPSULE    Take 1 capsule (40 mg total) by mouth once daily.    PEN NEEDLE,  "DIABETIC (BD ULTRA-FINE SHORT PEN NEEDLE) 31 GAUGE X 5/16" NDLE    Use 1 needle 5 times/day    POTASSIUM CHLORIDE SA (K-DUR,KLOR-CON) 20 MEQ TABLET    Take 1 tablet (20 mEq total) by mouth 2 (two) times daily.    TAMSULOSIN (FLOMAX) 0.4 MG CAP    Take 1 capsule by mouth once daily.    TIMOLOL MALEATE 0.5% (TIMOPTIC) 0.5 % DROP    1 drop 2 (two) times daily.   Discontinued Medications    ASPIRIN 81 MG CHEW    Take 1 tablet (81 mg total) by mouth once daily.        Past Social History:   Social History     Socioeconomic History    Marital status: Single    Number of children: 3   Occupational History    Occupation: nurses aide   Tobacco Use    Smoking status: Never Smoker    Smokeless tobacco: Never Used   Substance and Sexual Activity    Alcohol use: No    Drug use: No    Sexual activity: Never       Allergies:   Review of patient's allergies indicates:   Allergen Reactions    Metformin     Phenazopyridine     Sulfa (sulfonamide antibiotics)         Family History:   Family History   Problem Relation Age of Onset    Arthritis Mother     Depression Mother     Diabetes Mother     Heart disease Mother     Hypertension Mother     Arthritis Father     Depression Father     Arthritis Sister     Diabetes Sister     Arthritis Brother         Review of Systems:  Review of Systems   Constitutional: Negative for activity change and appetite change.   HENT: Negative for congestion and dental problem.    Respiratory: Negative for chest tightness and shortness of breath.    Cardiovascular: Negative for chest pain.   Gastrointestinal: Negative for abdominal distention and abdominal pain.   Genitourinary: Negative for decreased urine volume, difficulty urinating, dyspareunia, dysuria, enuresis, flank pain, frequency, genital sores, hematuria, pelvic pain and urgency.   Musculoskeletal: Negative for back pain and neck pain.   Allergic/Immunologic: Negative for immunocompromised state.   Neurological: Negative for " dizziness.   Hematological: Negative for adenopathy.   Psychiatric/Behavioral: Negative for agitation, behavioral problems and confusion.       Physical Exam:  Physical Exam  Constitutional:       Appearance: She is well-developed.   HENT:      Head: Normocephalic and atraumatic.   Eyes:      General: No scleral icterus.  Pulmonary:      Effort: Pulmonary effort is normal.      Breath sounds: Normal breath sounds.   Abdominal:      General: There is no distension.      Palpations: Abdomen is soft.      Tenderness: There is no abdominal tenderness.   Genitourinary:     Exam position: Supine.      Labia:         Right: No rash, tenderness or lesion.         Left: No rash, tenderness or lesion.       Vagina: Normal.      Rectum: Normal.   Musculoskeletal:      Cervical back: Normal range of motion.   Skin:     General: Skin is warm and dry.   Neurological:      Mental Status: She is alert and oriented to person, place, and time.         Assessment/Plan:   UTI--PVR today 7 mL.  Patient voided prior to arrival unable to give a specimen.  Discussed with patient and daughter she has no outward symptoms of UTI i.e. low back pain dysuria gross hematuria frequency urgency except her.  I asked her to watch for subtle clue such as decrease in appetite change in demeanor ex cetera.  Understanding was verbalized.  I will see her back in 3 months at that time I would like to check a urine and sooner with any signs or symptoms are proceed thought of a possible UTI.    Problem List Items Addressed This Visit        Renal/    Urinary retention - Primary    Relevant Orders    POCT Bladder Scan

## 2022-08-11 NOTE — PROGRESS NOTES
Subjective:      Patient ID: Radha Hoffman is a 62 y.o. female.    Chief Complaint: Hypertension (Discuss referral to cardio and Physical Therapy)     Patient with h/o DM with last A1c-= 9.4 improved from 14.  Patient reports better adherence with diet and medication. Patient fasting BS are running 120-140 and non fasting are running 150-150  Patient is taking Lantus 20 units q.h.s. and Humalog 20 units before each meal.    Patient was recently admitted to [facility] secondary to non ST elevation MI.   CTA was positive for RCA/lad disease along with 4 vessel disease.  Angiogram was done by Dr. Barrios, that showed moderate 3 vessel coronary artery disease.  No stents were placed and medical management was recommended.  Patient is on aspirin, beta blocker, ARB, statin + isosorbide. Patient reports she is not being followed by Cardiologist as he does not take her insurance. Patient to call her insurance and Physician office to find anyone who takes her insurance.  Patient blood pressures are better controlled than before but still running high patient reports compliance with medication    Was admitted to ICU secondary to urosepsis and pneumonia.  At that time patient was found to have anemia with hemoglobin of 7. Patient was transfused 1 unit of PRBCs.  Patient reports feeling better her last hemoglobin was 8.6.  Patient is also found to have hypergammaglobulinemia +  decreased kidney function.  Iron studies, serum protein and urine protein electrophoresis is ordered but not yet done.          Review of Systems   Constitutional: Positive for malaise/fatigue and weight loss (6 lb weight loss). Negative for chills, diaphoresis and fever.   HENT: Negative for congestion, ear pain, sinus pain, sore throat and tinnitus.    Eyes: Negative for blurred vision and photophobia.   Respiratory: Negative for cough, hemoptysis, shortness of breath and wheezing.    Cardiovascular: Positive for leg swelling.  Negative for chest pain, palpitations, orthopnea and PND.   Gastrointestinal: Negative for abdominal pain, blood in stool, constipation, diarrhea, heartburn, melena, nausea and vomiting.   Genitourinary: Negative for dysuria, frequency and urgency.   Musculoskeletal: Negative for back pain, myalgias and neck pain.   Skin: Negative for rash.   Neurological: Negative for dizziness, tremors, seizures, loss of consciousness and weakness.   Endo/Heme/Allergies: Negative for polydipsia.   Psychiatric/Behavioral: Negative for depression and hallucinations. The patient does not have insomnia.      Objective:     Physical Exam  Constitutional:       General: She is not in acute distress.     Appearance: She is not diaphoretic.   Neck:      Thyroid: No thyromegaly.   Cardiovascular:      Rate and Rhythm: Normal rate and regular rhythm.      Heart sounds: Normal heart sounds. No murmur heard.  Pulmonary:      Effort: Pulmonary effort is normal. No respiratory distress.      Breath sounds: Normal breath sounds. No wheezing.      Comments: Bilateral rib pain as well.   No pain when patient is distracted.  Abdominal:      General: Bowel sounds are normal. There is no distension.      Palpations: Abdomen is soft.      Tenderness: There is no abdominal tenderness (Epigastric tenderness).   Musculoskeletal:      Right lower leg: Edema (2+ upto Knee) present.      Left lower leg: Edema (2+ upto knee) present.   Lymphadenopathy:      Cervical: No cervical adenopathy.   Neurological:      Mental Status: She is alert and oriented to person, place, and time.   Psychiatric:         Behavior: Behavior normal.         Thought Content: Thought content normal.         Judgment: Judgment normal.       Assessment:       ICD-10-CM ICD-9-CM   1. Anemia, unspecified type  D64.9 285.9   2. Type 2 diabetes mellitus with diabetic polyneuropathy, with long-term current use of insulin  E11.42 250.60    Z79.4 357.2     V58.67   3. Benign essential HTN   I10 401.1   4. Coronary artery disease of native artery of native heart with stable angina pectoris  I25.118 414.01     413.9       Plan:     Patient last A1c 9.4 improved from 14  Patient data suggest that fasting blood sugars are under good control but post meal blood sugars specially after lunch and dinner are high  Will increase Humalog before lunch and dinner to 24 units  Will continue Humalog 20 units before breakfast  Will continue Lantus 20 units at night.  Patient last labs suggested anemia with hemoglobin of 8.6, increased platelets, decreased kidney function, increased gammaglobulin  Will check iron studies, his serum and urine protein electrophoresis  Will check CBC and CMP again  Patient had decreased kidney function after hospital discharge.  Will repeat kidney functions  Patient blood pressure still running high.  Will repeat blood pressures  Patient has a edema bilateral leg and Lasix is increased to 80 mg in the morning  Patient does not have any volume overload symptoms.  Will not change medication at this time  Will continue potassium supplement  Patient has coronary artery disease and was following Dr. Barrios   Cardiologist is not taking her insurance.  Advised patient to call around and see if any other cardiologist take her insurance  Also patient can call insurance to find any cardiologist who takes her insurance  Patient has gastroparesis and is on Reglan with some help with abdominal pain  Patient is quite deconditioned after ICU admission and need physical therapy  Unfortunately nobody in town takes Medicaid for physical therapy.  Patient to call her insurance to inquire any  Physical therapist who takes her insurance      Medication List with Changes/Refills   Current Medications    ACETAMINOPHEN (TYLENOL) 500 MG TABLET    Take 500 mg by mouth every 6 (six) hours as needed for Pain.    ASPIRIN 81 MG CHEW    CHEW AND SWALLOW 1 TABLET BY MOUTH ONCE DAILY    ATORVASTATIN (LIPITOR) 40 MG  "TABLET    Take 1 tablet (40 mg total) by mouth once daily.    BD VEO INSULIN SYRINGE UF 1 ML 31 GAUGE X 15/64" SYRG    USE AS DIRECTED    BLOOD SUGAR DIAGNOSTIC (ONETOUCH ULTRA TEST) [facility]    CHECK BLOOD GLUCOSE 3 TIMES A DAY    BLOOD-GLUCOSE METER KIT    Use as instructed    DULOXETINE (CYMBALTA) 30 MG CAPSULE    Take 30 mg by mouth once daily.    FLASH GLUCOSE SCANNING READER (FREESTYLE CRISTOBAL 2 READER) MISC    1 each by Misc.(Non-Drug; Combo Route) route once daily at 6am.    FLASH GLUCOSE SENSOR (FREESTYLE CRISTOBAL 2 SENSOR) KIT    1 each by Misc.(Non-Drug; Combo Route) route every 14 (fourteen) days.    FUROSEMIDE (LASIX) 40 MG TABLET    Take 1 tablet (40 mg total) by mouth once daily.    GABAPENTIN (NEURONTIN) 300 MG CAPSULE    TAKE 1 CAPSULE BY MOUTH THREE TIMES DAILY    GUAIFENESIN (HUMIBID E) 400 MG TAB    Take 400 mg by mouth.    HYDRALAZINE (APRESOLINE) 100 MG TABLET    Take 1 tablet (100 mg total) by mouth 2 (two) times daily.    ISOSORBIDE MONONITRATE (IMDUR) 30 MG 24 HR TABLET    Take 1 tablet (30 mg total) by mouth once daily.    LANCETS 32 GAUGE MISC    1 lancet by Misc.(Non-Drug; Combo Route) route 3 (three) times daily.    LOPERAMIDE (IMODIUM) 2 MG CAPSULE    Take 2 mg by mouth 4 (four) times daily as needed for Diarrhea.    LORATADINE 10 MG CAP    1 tablet    LOSARTAN (COZAAR) 100 MG TABLET    TAKE 1 TABLET BY MOUTH ONCE DAILY FOR 30 DAYS    METOCLOPRAMIDE HCL (REGLAN) 10 MG TABLET    Take 10 mg by mouth 4 (four) times daily before meals and nightly.    METOPROLOL TARTRATE (LOPRESSOR) 50 MG TABLET    50 mg 2 (two) times daily.    MIRTAZAPINE (REMERON) 15 MG TABLET    Take 15 mg by mouth every evening.    MONTELUKAST (SINGULAIR) 10 MG TABLET    TAKE 1 TABLET BY MOUTH ONCE DAILY FOR 30 DAYS    PEN NEEDLE, DIABETIC (BD ULTRA-FINE SHORT PEN NEEDLE) 31 GAUGE X 5/16" NDLE    Use 1 needle 5 times/day    POTASSIUM CHLORIDE SA (K-DUR,KLOR-CON) 20 MEQ TABLET    Take 1 tablet (20 mEq total) by mouth 2 (two) times " daily.    TAMSULOSIN (FLOMAX) 0.4 MG CAP    Take 1 capsule by mouth once daily.    TIMOLOL MALEATE 0.5% (TIMOPTIC) 0.5 % DROP    1 drop 2 (two) times daily.   Changed and/or Refilled Medications    Modified Medication Previous Medication    INSULIN (LANTUS SOLOSTAR U-100 INSULIN) GLARGINE 100 UNITS/ML SUBQ PEN insulin (LANTUS SOLOSTAR U-100 INSULIN) glargine 100 units/mL SubQ pen       Inject 20 Units into the skin 2 (two) times a day.    Inject 20 Units into the skin 2 (two) times a day.

## 2022-08-11 NOTE — TELEPHONE ENCOUNTER
Pt informed that her ins stated no pa required but when call was placed to her pharmacy --pharmacy stated they only do medical and pt has been using a discount card so instead of $148 she can get the senors for a month supply--2 for $86--does not have top buy a reader each month --forms have been filled out twice which her ins has sent to be completed and office notes also sent along with lab valves--all information they had required to see if they would approve

## 2022-08-12 DIAGNOSIS — N17.9 ACUTE RENAL FAILURE, UNSPECIFIED ACUTE RENAL FAILURE TYPE: Primary | ICD-10-CM

## 2022-08-12 LAB
ALBUMIN SERPL-MCNC: 3.5 G/DL (ref 3.6–5.1)
ALBUMIN/GLOB SERPL: 0.8 (CALC) (ref 1–2.5)
ALP SERPL-CCNC: 226 U/L (ref 37–153)
ALT SERPL-CCNC: 26 U/L (ref 6–29)
AST SERPL-CCNC: 26 U/L (ref 10–35)
BASOPHILS # BLD AUTO: 52 CELLS/UL (ref 0–200)
BASOPHILS NFR BLD AUTO: 0.5 %
BILIRUB SERPL-MCNC: 0.5 MG/DL (ref 0.2–1.2)
BUN SERPL-MCNC: 17 MG/DL (ref 7–25)
BUN/CREAT SERPL: 13 (CALC) (ref 6–22)
CALCIUM SERPL-MCNC: 9.3 MG/DL (ref 8.6–10.4)
CHLORIDE SERPL-SCNC: 101 MMOL/L (ref 98–110)
CO2 SERPL-SCNC: 27 MMOL/L (ref 20–32)
CREAT SERPL-MCNC: 1.34 MG/DL (ref 0.5–1.05)
EGFR: 44 ML/MIN/1.73M2
EOSINOPHIL # BLD AUTO: 258 CELLS/UL (ref 15–500)
EOSINOPHIL NFR BLD AUTO: 2.5 %
ERYTHROCYTE [DISTWIDTH] IN BLOOD BY AUTOMATED COUNT: 14.5 % (ref 11–15)
FERRITIN SERPL-MCNC: 121 NG/ML (ref 16–288)
GLOBULIN SER CALC-MCNC: 4.2 G/DL (CALC) (ref 1.9–3.7)
GLUCOSE SERPL-MCNC: 227 MG/DL (ref 65–99)
HCT VFR BLD AUTO: 27.3 % (ref 35–45)
HGB BLD-MCNC: 8.5 G/DL (ref 11.7–15.5)
IRON SATN MFR SERPL: 8 % (CALC) (ref 16–45)
IRON SERPL-MCNC: 21 MCG/DL (ref 45–160)
LYMPHOCYTES # BLD AUTO: 1926 CELLS/UL (ref 850–3900)
LYMPHOCYTES NFR BLD AUTO: 18.7 %
MCH RBC QN AUTO: 27.4 PG (ref 27–33)
MCHC RBC AUTO-ENTMCNC: 31.1 G/DL (ref 32–36)
MCV RBC AUTO: 88.1 FL (ref 80–100)
MONOCYTES # BLD AUTO: 628 CELLS/UL (ref 200–950)
MONOCYTES NFR BLD AUTO: 6.1 %
NEUTROPHILS # BLD AUTO: 7437 CELLS/UL (ref 1500–7800)
NEUTROPHILS NFR BLD AUTO: 72.2 %
PLATELET # BLD AUTO: 411 THOUSAND/UL (ref 140–400)
PMV BLD REES-ECKER: 10.4 FL (ref 7.5–12.5)
POTASSIUM SERPL-SCNC: 5.1 MMOL/L (ref 3.5–5.3)
PROT SERPL-MCNC: 7.7 G/DL (ref 6.1–8.1)
RBC # BLD AUTO: 3.1 MILLION/UL (ref 3.8–5.1)
SODIUM SERPL-SCNC: 139 MMOL/L (ref 135–146)
TIBC SERPL-MCNC: 264 MCG/DL (CALC) (ref 250–450)
WBC # BLD AUTO: 10.3 THOUSAND/UL (ref 3.8–10.8)

## 2022-08-13 LAB
ALBUMIN SERPL ELPH-MCNC: 3.4 G/DL (ref 3.8–4.8)
ALPHA1 GLOB SERPL ELPH-MCNC: 0.5 G/DL (ref 0.2–0.3)
ALPHA2 GLOB SERPL ELPH-MCNC: 1 G/DL (ref 0.5–0.9)
BETA1 GLOB SERPL ELPH-MCNC: 0.4 G/DL (ref 0.4–0.6)
BETA2 GLOB SERPL ELPH-MCNC: 0.5 G/DL (ref 0.2–0.5)
GAMMA GLOB SERPL ELPH-MCNC: 2.1 G/DL (ref 0.8–1.7)
PROT PATTERN SERPL ELPH-IMP: ABNORMAL
PROT SERPL-MCNC: 8 G/DL (ref 6.1–8.1)
RETICS #: ABNORMAL CELLS/UL (ref 20000–80000)
RETICS/RBC NFR AUTO: 2.6 %

## 2022-09-14 ENCOUNTER — OFFICE VISIT (OUTPATIENT)
Dept: PRIMARY CARE CLINIC | Facility: CLINIC | Age: 62
End: 2022-09-14
Payer: MEDICAID

## 2022-09-14 VITALS
HEART RATE: 73 BPM | OXYGEN SATURATION: 96 % | SYSTOLIC BLOOD PRESSURE: 164 MMHG | TEMPERATURE: 97 F | WEIGHT: 130 LBS | BODY MASS INDEX: 26.21 KG/M2 | HEIGHT: 59 IN | DIASTOLIC BLOOD PRESSURE: 78 MMHG | RESPIRATION RATE: 16 BRPM

## 2022-09-14 DIAGNOSIS — I25.118 CORONARY ARTERY DISEASE OF NATIVE ARTERY OF NATIVE HEART WITH STABLE ANGINA PECTORIS: ICD-10-CM

## 2022-09-14 DIAGNOSIS — I10 BENIGN ESSENTIAL HTN: ICD-10-CM

## 2022-09-14 DIAGNOSIS — E11.42 TYPE 2 DIABETES MELLITUS WITH DIABETIC POLYNEUROPATHY, WITH LONG-TERM CURRENT USE OF INSULIN: Primary | ICD-10-CM

## 2022-09-14 DIAGNOSIS — D50.9 IRON DEFICIENCY ANEMIA, UNSPECIFIED IRON DEFICIENCY ANEMIA TYPE: ICD-10-CM

## 2022-09-14 DIAGNOSIS — Z79.4 TYPE 2 DIABETES MELLITUS WITH DIABETIC POLYNEUROPATHY, WITH LONG-TERM CURRENT USE OF INSULIN: Primary | ICD-10-CM

## 2022-09-14 PROCEDURE — 3066F NEPHROPATHY DOC TX: CPT | Mod: CPTII,S$GLB,, | Performed by: INTERNAL MEDICINE

## 2022-09-14 PROCEDURE — 3046F HEMOGLOBIN A1C LEVEL >9.0%: CPT | Mod: CPTII,S$GLB,, | Performed by: INTERNAL MEDICINE

## 2022-09-14 PROCEDURE — 99214 OFFICE O/P EST MOD 30 MIN: CPT | Mod: S$GLB,,, | Performed by: INTERNAL MEDICINE

## 2022-09-14 PROCEDURE — 4010F ACE/ARB THERAPY RXD/TAKEN: CPT | Mod: CPTII,S$GLB,, | Performed by: INTERNAL MEDICINE

## 2022-09-14 PROCEDURE — 1160F PR REVIEW ALL MEDS BY PRESCRIBER/CLIN PHARMACIST DOCUMENTED: ICD-10-PCS | Mod: CPTII,S$GLB,, | Performed by: INTERNAL MEDICINE

## 2022-09-14 PROCEDURE — 3077F PR MOST RECENT SYSTOLIC BLOOD PRESSURE >= 140 MM HG: ICD-10-PCS | Mod: CPTII,S$GLB,, | Performed by: INTERNAL MEDICINE

## 2022-09-14 PROCEDURE — 99214 PR OFFICE/OUTPT VISIT, EST, LEVL IV, 30-39 MIN: ICD-10-PCS | Mod: S$GLB,,, | Performed by: INTERNAL MEDICINE

## 2022-09-14 PROCEDURE — 3008F PR BODY MASS INDEX (BMI) DOCUMENTED: ICD-10-PCS | Mod: CPTII,S$GLB,, | Performed by: INTERNAL MEDICINE

## 2022-09-14 PROCEDURE — 3078F DIAST BP <80 MM HG: CPT | Mod: CPTII,S$GLB,, | Performed by: INTERNAL MEDICINE

## 2022-09-14 PROCEDURE — 1159F PR MEDICATION LIST DOCUMENTED IN MEDICAL RECORD: ICD-10-PCS | Mod: CPTII,S$GLB,, | Performed by: INTERNAL MEDICINE

## 2022-09-14 PROCEDURE — 3060F POS MICROALBUMINURIA REV: CPT | Mod: CPTII,S$GLB,, | Performed by: INTERNAL MEDICINE

## 2022-09-14 PROCEDURE — 1160F RVW MEDS BY RX/DR IN RCRD: CPT | Mod: CPTII,S$GLB,, | Performed by: INTERNAL MEDICINE

## 2022-09-14 PROCEDURE — 3046F PR MOST RECENT HEMOGLOBIN A1C LEVEL > 9.0%: ICD-10-PCS | Mod: CPTII,S$GLB,, | Performed by: INTERNAL MEDICINE

## 2022-09-14 PROCEDURE — 4010F PR ACE/ARB THEARPY RXD/TAKEN: ICD-10-PCS | Mod: CPTII,S$GLB,, | Performed by: INTERNAL MEDICINE

## 2022-09-14 PROCEDURE — 3066F PR DOCUMENTATION OF TREATMENT FOR NEPHROPATHY: ICD-10-PCS | Mod: CPTII,S$GLB,, | Performed by: INTERNAL MEDICINE

## 2022-09-14 PROCEDURE — 3008F BODY MASS INDEX DOCD: CPT | Mod: CPTII,S$GLB,, | Performed by: INTERNAL MEDICINE

## 2022-09-14 PROCEDURE — 3077F SYST BP >= 140 MM HG: CPT | Mod: CPTII,S$GLB,, | Performed by: INTERNAL MEDICINE

## 2022-09-14 PROCEDURE — 3078F PR MOST RECENT DIASTOLIC BLOOD PRESSURE < 80 MM HG: ICD-10-PCS | Mod: CPTII,S$GLB,, | Performed by: INTERNAL MEDICINE

## 2022-09-14 PROCEDURE — 3060F PR POS MICROALBUMINURIA RESULT DOCUMENTED/REVIEW: ICD-10-PCS | Mod: CPTII,S$GLB,, | Performed by: INTERNAL MEDICINE

## 2022-09-14 PROCEDURE — 1159F MED LIST DOCD IN RCRD: CPT | Mod: CPTII,S$GLB,, | Performed by: INTERNAL MEDICINE

## 2022-09-14 RX ORDER — METOPROLOL TARTRATE 50 MG/1
50 TABLET ORAL 2 TIMES DAILY
Qty: 180 TABLET | Refills: 3 | Status: SHIPPED | OUTPATIENT
Start: 2022-09-14 | End: 2022-11-16

## 2022-09-14 RX ORDER — DICLOFENAC SODIUM 30 MG/G
GEL TOPICAL 2 TIMES DAILY
COMMUNITY
End: 2023-05-24

## 2022-09-14 RX ORDER — FERROUS SULFATE 325(65) MG
325 TABLET, DELAYED RELEASE (ENTERIC COATED) ORAL DAILY
Qty: 90 TABLET | Refills: 1 | Status: SHIPPED | OUTPATIENT
Start: 2022-09-14 | End: 2023-04-17 | Stop reason: ALTCHOICE

## 2022-09-14 RX ORDER — PEN NEEDLE, DIABETIC 30 GX3/16"
1 NEEDLE, DISPOSABLE MISCELLANEOUS
COMMUNITY
End: 2022-09-14 | Stop reason: SDUPTHER

## 2022-09-14 RX ORDER — PEN NEEDLE, DIABETIC 30 GX3/16"
1 NEEDLE, DISPOSABLE MISCELLANEOUS
Qty: 500 EACH | Refills: 3 | Status: SHIPPED | OUTPATIENT
Start: 2022-09-14 | End: 2023-11-13

## 2022-09-14 NOTE — PROGRESS NOTES
Subjective:      Patient ID: Radha Hoffman is a 62 y.o. female.    Chief Complaint: Diabetes (1 month f/u)     Patient with h/o DM with last A1c-= 9.4 improved from 14.  Patient reports better adherence with diet and medication.  Patient has CGM and the data is downloaded and average blood sugar is 124 with 75% of the time blood sugar is under okay control.  Blood sugars are running low 10% of the time and running high 15% of the time.  Patient blood sugar ranged from .  Low blood sugar are seen in the mornings.  Patient is taking Lantus 20 units twice a day and Humalog 20 units before each meal.       Patient with h/o non ST elevation MI + CTA was positive for RCA/lad disease along with 4 vessel disease.  Angiogram was done by Dr. Barrios, that showed moderate 3 vessel coronary artery disease.  No stents were placed and medical management was recommended.  Patient is on aspirin, beta blocker, ARB, statin + isosorbide. Patient has an appointment with Cardiologist in 2 days. Patient is on hydralazine but has not picked up the medication x 2 days.     Patient was admitted in ICU with urosepsis and pneumonia..  After that mission patient was feeling weak and is using walker for mobility.  Patient recently started physical therapy and that seems to be helping.   Patient was recently found to have anemia with hemoglobin of 8.5 improved from 7 when patient was in hospital.  Patient was given 1 unit of PRBC.  Lab suggested iron deficiency anemia.  Serum protein electrophoresis was normal.  Patient previous labs suggested hypergammaglobulinemia, decreased kidney function.  Patient is referred to nephrologist but has not yet received an appointment.  Patient daughter reports having EGD and colonoscopy done in hospital...  Will get reports         Review of Systems   Constitutional:  Positive for malaise/fatigue and weight loss (9 lb weight loss). Negative for chills, diaphoresis and fever.   HENT:  Negative  for congestion, ear pain, sinus pain, sore throat and tinnitus.    Eyes:  Negative for blurred vision and photophobia.   Respiratory:  Negative for cough, hemoptysis, shortness of breath and wheezing.    Cardiovascular:  Negative for chest pain, palpitations, orthopnea, leg swelling (ankle swelling is imrpoved) and PND.   Gastrointestinal:  Negative for abdominal pain, blood in stool, constipation, diarrhea, heartburn, melena, nausea and vomiting.   Genitourinary:  Negative for dysuria, frequency and urgency.   Musculoskeletal:  Negative for back pain, myalgias and neck pain.   Skin:  Negative for rash.   Neurological:  Negative for dizziness, tremors, seizures, loss of consciousness and weakness.   Endo/Heme/Allergies:  Negative for polydipsia.   Psychiatric/Behavioral:  Negative for depression and hallucinations. The patient does not have insomnia.    Objective:     Physical Exam  Constitutional:       General: She is not in acute distress.     Appearance: She is not diaphoretic.   Neck:      Thyroid: No thyromegaly.   Cardiovascular:      Rate and Rhythm: Normal rate and regular rhythm.      Heart sounds: Normal heart sounds. No murmur heard.  Pulmonary:      Effort: Pulmonary effort is normal. No respiratory distress.      Breath sounds: Normal breath sounds. No wheezing.      Comments: Bilateral rib pain as well.   No pain when patient is distracted.  Abdominal:      General: Bowel sounds are normal. There is no distension.      Palpations: Abdomen is soft.      Tenderness: There is no abdominal tenderness.   Musculoskeletal:      Right lower leg: No edema.      Left lower leg: No edema.   Lymphadenopathy:      Cervical: No cervical adenopathy.   Neurological:      Mental Status: She is alert and oriented to person, place, and time.   Psychiatric:         Behavior: Behavior normal.         Thought Content: Thought content normal.         Judgment: Judgment normal.     Assessment:       ICD-10-CM ICD-9-CM   1.  Type 2 diabetes mellitus with diabetic polyneuropathy, with long-term current use of insulin  E11.42 250.60    Z79.4 357.2     V58.67   2. Coronary artery disease of native artery of native heart with stable angina pectoris  I25.118 414.01     413.9   3. Benign essential HTN  I10 401.1   4. Iron deficiency anemia, unspecified iron deficiency anemia type  D50.9 280.9       Plan:       Patient last A1c of 9.4 improved from 14  Patient CGM is downloaded and it suggest patient is having some hypoglycemic episode early in the morning  Patient is taking Lantus 20 units twice a day decreased to 18 units twice a day  Will check A1c and CMP today  Patient previous labs suggested anemia with hemoglobin of 8.6.  Iron studies suggested iron deficiency with iron saturation of 8% only  Will start patient on iron supplementation  Will repeat labs.  Review of records from hospital suggest patient had an EGD but not colonoscopy  Will refer to GI for possible colonoscopy  Patient has decreased kidney function and is referred to nephrologist  Patient has not yet received the appointment Will refer again  Patient blood pressures are running high today.  Will repeat blood pressure.  Repeat BP is still high.. Advised patient to take BP medication as prescribed.   Patient is off of hydralazine and that may be contributing to high blood pressure.  Patient has appointment with cardiologist  Advised patient to keep appointment  During hospital stay patient had deconditioning and has started physical therapy with some help  Patient is still using walker for mobility  Will request disability parking for patient        Medication List with Changes/Refills   Current Medications    ACETAMINOPHEN (TYLENOL) 500 MG TABLET    Take 500 mg by mouth every 6 (six) hours as needed for Pain.    ASPIRIN 81 MG CHEW    CHEW AND SWALLOW 1 TABLET BY MOUTH ONCE DAILY    ATORVASTATIN (LIPITOR) 40 MG TABLET    Take 1 tablet (40 mg total) by mouth once daily.     BLOOD SUGAR DIAGNOSTIC (ONETOUCH ULTRA TEST) [facility]    CHECK BLOOD GLUCOSE 3 TIMES A DAY    BLOOD-GLUCOSE METER KIT    Use as instructed    DICLOFENAC SODIUM (SOLARAZE) 3 % GEL    Apply topically 2 (two) times daily.    DULOXETINE (CYMBALTA) 30 MG CAPSULE    Take 30 mg by mouth once daily.    FLASH GLUCOSE SCANNING READER (FREESTYLE CRISTOBAL 2 READER) MISC    1 each by Misc.(Non-Drug; Combo Route) route once daily at 6am.    FLASH GLUCOSE SENSOR (FREESTYLE CRISTOBAL 2 SENSOR) KIT    1 each by Misc.(Non-Drug; Combo Route) route every 14 (fourteen) days.    FUROSEMIDE (LASIX) 40 MG TABLET    Take 1 tablet (40 mg total) by mouth once daily.    GABAPENTIN (NEURONTIN) 300 MG CAPSULE    TAKE 1 CAPSULE BY MOUTH THREE TIMES DAILY    HYDRALAZINE (APRESOLINE) 100 MG TABLET    Take 1 tablet (100 mg total) by mouth 2 (two) times daily.    INSULIN (LANTUS SOLOSTAR U-100 INSULIN) GLARGINE 100 UNITS/ML SUBQ PEN    Inject 20 Units into the skin 2 (two) times a day.    ISOSORBIDE MONONITRATE (IMDUR) 30 MG 24 HR TABLET    Take 1 tablet (30 mg total) by mouth once daily.    LANCETS 32 GAUGE MISC    1 lancet by Misc.(Non-Drug; Combo Route) route 3 (three) times daily.    LOPERAMIDE (IMODIUM) 2 MG CAPSULE    Take 2 mg by mouth 4 (four) times daily as needed for Diarrhea.    LORATADINE 10 MG CAP    1 tablet    LOSARTAN (COZAAR) 100 MG TABLET    TAKE 1 TABLET BY MOUTH ONCE DAILY FOR 30 DAYS    METOCLOPRAMIDE HCL (REGLAN) 10 MG TABLET    Take 10 mg by mouth 4 (four) times daily before meals and nightly.    METOPROLOL TARTRATE (LOPRESSOR) 50 MG TABLET    50 mg 2 (two) times daily.    MIRTAZAPINE (REMERON) 15 MG TABLET    Take 15 mg by mouth every evening.    MONTELUKAST (SINGULAIR) 10 MG TABLET    TAKE 1 TABLET BY MOUTH ONCE DAILY FOR 30 DAYS    POTASSIUM CHLORIDE SA (K-DUR,KLOR-CON) 20 MEQ TABLET    Take 1 tablet (20 mEq total) by mouth 2 (two) times daily.    TIMOLOL MALEATE 0.5% (TIMOPTIC) 0.5 % DROP    1 drop 2 (two) times daily.   Changed  "and/or Refilled Medications    Modified Medication Previous Medication    PEN NEEDLE, DIABETIC (BD ULTRA-FINE SHORT PEN NEEDLE) 31 GAUGE X 5/16" NDLE pen needle, diabetic (BD ULTRA-FINE SHORT PEN NEEDLE) 31 gauge x 5/16" Ndle       1 each by Misc.(Non-Drug; Combo Route) route 5 (five) times daily.    1 each by Misc.(Non-Drug; Combo Route) route.   Discontinued Medications    BD VEO INSULIN SYRINGE UF 1 ML 31 GAUGE X 15/64" SYRG    USE AS DIRECTED    GUAIFENESIN (HUMIBID E) 400 MG TAB    Take 400 mg by mouth.    PEN NEEDLE, DIABETIC (BD ULTRA-FINE SHORT PEN NEEDLE) 31 GAUGE X 5/16" NDLE    Use 1 needle 5 times/day    TAMSULOSIN (FLOMAX) 0.4 MG CAP    Take 1 capsule by mouth once daily.                                    "

## 2022-09-15 LAB
ALBUMIN SERPL-MCNC: 3.6 G/DL (ref 3.6–5.1)
ALBUMIN/GLOB SERPL: 0.9 (CALC) (ref 1–2.5)
ALP SERPL-CCNC: 79 U/L (ref 37–153)
ALT SERPL-CCNC: 14 U/L (ref 6–29)
AST SERPL-CCNC: 15 U/L (ref 10–35)
BASOPHILS # BLD AUTO: 38 CELLS/UL (ref 0–200)
BASOPHILS NFR BLD AUTO: 0.5 %
BILIRUB SERPL-MCNC: 0.4 MG/DL (ref 0.2–1.2)
BUN SERPL-MCNC: 18 MG/DL (ref 7–25)
BUN/CREAT SERPL: 15 (CALC) (ref 6–22)
CALCIUM SERPL-MCNC: 9.8 MG/DL (ref 8.6–10.4)
CHLORIDE SERPL-SCNC: 106 MMOL/L (ref 98–110)
CO2 SERPL-SCNC: 29 MMOL/L (ref 20–32)
CREAT SERPL-MCNC: 1.17 MG/DL (ref 0.5–1.05)
EGFR: 52 ML/MIN/1.73M2
EOSINOPHIL # BLD AUTO: 143 CELLS/UL (ref 15–500)
EOSINOPHIL NFR BLD AUTO: 1.9 %
ERYTHROCYTE [DISTWIDTH] IN BLOOD BY AUTOMATED COUNT: 14.8 % (ref 11–15)
GLOBULIN SER CALC-MCNC: 4.1 G/DL (CALC) (ref 1.9–3.7)
GLUCOSE SERPL-MCNC: 168 MG/DL (ref 65–139)
HBA1C MFR BLD: 7.3 % OF TOTAL HGB
HCT VFR BLD AUTO: 29.1 % (ref 35–45)
HGB BLD-MCNC: 9.5 G/DL (ref 11.7–15.5)
LYMPHOCYTES # BLD AUTO: 2108 CELLS/UL (ref 850–3900)
LYMPHOCYTES NFR BLD AUTO: 28.1 %
MCH RBC QN AUTO: 28.3 PG (ref 27–33)
MCHC RBC AUTO-ENTMCNC: 32.6 G/DL (ref 32–36)
MCV RBC AUTO: 86.6 FL (ref 80–100)
MONOCYTES # BLD AUTO: 555 CELLS/UL (ref 200–950)
MONOCYTES NFR BLD AUTO: 7.4 %
NEUTROPHILS # BLD AUTO: 4658 CELLS/UL (ref 1500–7800)
NEUTROPHILS NFR BLD AUTO: 62.1 %
PLATELET # BLD AUTO: 325 THOUSAND/UL (ref 140–400)
PMV BLD REES-ECKER: 9.5 FL (ref 7.5–12.5)
POTASSIUM SERPL-SCNC: 5 MMOL/L (ref 3.5–5.3)
PROT SERPL-MCNC: 7.7 G/DL (ref 6.1–8.1)
RBC # BLD AUTO: 3.36 MILLION/UL (ref 3.8–5.1)
SODIUM SERPL-SCNC: 139 MMOL/L (ref 135–146)
WBC # BLD AUTO: 7.5 THOUSAND/UL (ref 3.8–10.8)

## 2022-10-11 ENCOUNTER — TELEPHONE (OUTPATIENT)
Dept: PRIMARY CARE CLINIC | Facility: CLINIC | Age: 62
End: 2022-10-11
Payer: MEDICAID

## 2022-10-11 NOTE — TELEPHONE ENCOUNTER
----- Message from Juliette Matute LPN sent at 9/27/2022  4:46 PM CDT -----  Contact: Piedad @ [facility]    ----- Message -----  From: Rebekah Lemos  Sent: 9/27/2022   4:07 PM CDT  To: Priyanka Nowak Staff    Type: Staff Message    Who called:  Piedad @ [facility]  Reason for the call: Needs to have Dr Mathew complete the form she sent for patient's physical therapy orders.   Call back number: [phone]  Fax number: Please fax back completed form to [phone]  Patient name and/or MRN:  Radha Hoffman / [id number]   Additional information: n/a

## 2022-10-12 NOTE — TELEPHONE ENCOUNTER
Patient requested order for physical therapy.   Problem: Activity Intolerance  Intervention: Support Encourage use of existing support systems  Intervention Status  Done

## 2022-10-12 NOTE — TELEPHONE ENCOUNTER
----- Message from Juliette Matute LPN sent at 10/6/2022  4:43 PM CDT -----  Contact: rodriguez    ----- Message -----  From: Carli Lau  Sent: 10/6/2022   3:02 PM CDT  To: Priyanka Nowak Staff    .Type:  Patient Returning Call    Who Called:[facility] pt therapy   Who Left Message for Patient:  Does the patient know what this is regarding?:physical therapy order   Would the patient rather a call back or a response via [facility]?   Best Call Back Number:[phone] fax  [phone]  Additional Information:

## 2022-10-14 NOTE — TELEPHONE ENCOUNTER
Staff attempted to call [facility] [phone] and go no answer...staff faxed req for last note to [phone]

## 2022-10-19 ENCOUNTER — OFFICE VISIT (OUTPATIENT)
Dept: PRIMARY CARE CLINIC | Facility: CLINIC | Age: 62
End: 2022-10-19
Payer: MEDICAID

## 2022-10-19 VITALS
SYSTOLIC BLOOD PRESSURE: 101 MMHG | HEIGHT: 59 IN | RESPIRATION RATE: 16 BRPM | WEIGHT: 131.81 LBS | DIASTOLIC BLOOD PRESSURE: 58 MMHG | TEMPERATURE: 97 F | OXYGEN SATURATION: 97 % | BODY MASS INDEX: 26.57 KG/M2 | HEART RATE: 54 BPM

## 2022-10-19 DIAGNOSIS — G89.29 CHRONIC RIGHT SHOULDER PAIN: ICD-10-CM

## 2022-10-19 DIAGNOSIS — M25.511 CHRONIC RIGHT SHOULDER PAIN: ICD-10-CM

## 2022-10-19 DIAGNOSIS — Z12.11 COLON CANCER SCREENING: ICD-10-CM

## 2022-10-19 DIAGNOSIS — E11.42 TYPE 2 DIABETES MELLITUS WITH DIABETIC POLYNEUROPATHY, WITH LONG-TERM CURRENT USE OF INSULIN: Primary | ICD-10-CM

## 2022-10-19 DIAGNOSIS — I10 BENIGN ESSENTIAL HTN: ICD-10-CM

## 2022-10-19 DIAGNOSIS — D50.9 IRON DEFICIENCY ANEMIA, UNSPECIFIED IRON DEFICIENCY ANEMIA TYPE: ICD-10-CM

## 2022-10-19 DIAGNOSIS — Z79.4 TYPE 2 DIABETES MELLITUS WITH DIABETIC POLYNEUROPATHY, WITH LONG-TERM CURRENT USE OF INSULIN: Primary | ICD-10-CM

## 2022-10-19 DIAGNOSIS — I25.118 CORONARY ARTERY DISEASE OF NATIVE ARTERY OF NATIVE HEART WITH STABLE ANGINA PECTORIS: ICD-10-CM

## 2022-10-19 PROCEDURE — 3051F HG A1C>EQUAL 7.0%<8.0%: CPT | Mod: CPTII,S$GLB,, | Performed by: INTERNAL MEDICINE

## 2022-10-19 PROCEDURE — 3078F PR MOST RECENT DIASTOLIC BLOOD PRESSURE < 80 MM HG: ICD-10-PCS | Mod: CPTII,S$GLB,, | Performed by: INTERNAL MEDICINE

## 2022-10-19 PROCEDURE — 95251 PR GLUCOSE MONITOR, 72 HOUR, PHYS INTERP: ICD-10-PCS | Mod: S$GLB,,, | Performed by: INTERNAL MEDICINE

## 2022-10-19 PROCEDURE — 3078F DIAST BP <80 MM HG: CPT | Mod: CPTII,S$GLB,, | Performed by: INTERNAL MEDICINE

## 2022-10-19 PROCEDURE — 3074F SYST BP LT 130 MM HG: CPT | Mod: CPTII,S$GLB,, | Performed by: INTERNAL MEDICINE

## 2022-10-19 PROCEDURE — 3066F NEPHROPATHY DOC TX: CPT | Mod: CPTII,S$GLB,, | Performed by: INTERNAL MEDICINE

## 2022-10-19 PROCEDURE — 3066F PR DOCUMENTATION OF TREATMENT FOR NEPHROPATHY: ICD-10-PCS | Mod: CPTII,S$GLB,, | Performed by: INTERNAL MEDICINE

## 2022-10-19 PROCEDURE — 3074F PR MOST RECENT SYSTOLIC BLOOD PRESSURE < 130 MM HG: ICD-10-PCS | Mod: CPTII,S$GLB,, | Performed by: INTERNAL MEDICINE

## 2022-10-19 PROCEDURE — 1159F PR MEDICATION LIST DOCUMENTED IN MEDICAL RECORD: ICD-10-PCS | Mod: CPTII,S$GLB,, | Performed by: INTERNAL MEDICINE

## 2022-10-19 PROCEDURE — 3051F PR MOST RECENT HEMOGLOBIN A1C LEVEL 7.0 - < 8.0%: ICD-10-PCS | Mod: CPTII,S$GLB,, | Performed by: INTERNAL MEDICINE

## 2022-10-19 PROCEDURE — 1160F PR REVIEW ALL MEDS BY PRESCRIBER/CLIN PHARMACIST DOCUMENTED: ICD-10-PCS | Mod: CPTII,S$GLB,, | Performed by: INTERNAL MEDICINE

## 2022-10-19 PROCEDURE — 99214 OFFICE O/P EST MOD 30 MIN: CPT | Mod: S$GLB,,, | Performed by: INTERNAL MEDICINE

## 2022-10-19 PROCEDURE — 3060F PR POS MICROALBUMINURIA RESULT DOCUMENTED/REVIEW: ICD-10-PCS | Mod: CPTII,S$GLB,, | Performed by: INTERNAL MEDICINE

## 2022-10-19 PROCEDURE — 3060F POS MICROALBUMINURIA REV: CPT | Mod: CPTII,S$GLB,, | Performed by: INTERNAL MEDICINE

## 2022-10-19 PROCEDURE — 99214 PR OFFICE/OUTPT VISIT, EST, LEVL IV, 30-39 MIN: ICD-10-PCS | Mod: S$GLB,,, | Performed by: INTERNAL MEDICINE

## 2022-10-19 PROCEDURE — 1160F RVW MEDS BY RX/DR IN RCRD: CPT | Mod: CPTII,S$GLB,, | Performed by: INTERNAL MEDICINE

## 2022-10-19 PROCEDURE — 1159F MED LIST DOCD IN RCRD: CPT | Mod: CPTII,S$GLB,, | Performed by: INTERNAL MEDICINE

## 2022-10-19 PROCEDURE — 4010F PR ACE/ARB THEARPY RXD/TAKEN: ICD-10-PCS | Mod: CPTII,S$GLB,, | Performed by: INTERNAL MEDICINE

## 2022-10-19 PROCEDURE — 4010F ACE/ARB THERAPY RXD/TAKEN: CPT | Mod: CPTII,S$GLB,, | Performed by: INTERNAL MEDICINE

## 2022-10-19 PROCEDURE — 95251 CONT GLUC MNTR ANALYSIS I&R: CPT | Mod: S$GLB,,, | Performed by: INTERNAL MEDICINE

## 2022-10-19 NOTE — PROGRESS NOTES
Subjective:      Patient ID: Radha Hoffman is a 62 y.o. female.    Chief Complaint: Follow-up     Patient with h/o DM with last A1c-= 7.3 improved from 9.4.  Patient reports better adherence with diet and medication.  Patient has CGM and the data is downloaded and average blood sugar is  97 with 65% of the time blood sugar is in target range.  Blood sugars are running high 6% of the time but has lows 14% of the time (between 54 and 69) and very low less than 54 15% of the time.  Patient is taking Lantus 20 units twice a day and Humalog 20 units before each meal.  Multiple hypoglycemic episodes are probably secondary to moderate tenderness with diet and medication.  Patient takes gabapentin for neuropathy involving bilateral feet      Patient with h/o non ST elevation MI + CTA was positive for RCA/lad disease along with 4 vessel disease.  Angiogram was done by Dr. Barrios, that showed moderate 3 vessel coronary artery disease.  No stents were placed and medical management was recommended.  Patient is on aspirin, beta blocker, ARB, statin + isosorbide.  Blood pressures are running low probably better tenderness with medication.  Patient denies fatigue, dizziness or lightheadedness    Patient reports depression is under okay control.  Patient is not being followed by a psychiatrist.  Patient is on Cymbalta and Mirtazipine. Patient reports feeling sleepy through out the day and has multiple naps.     Patient complains of right shoulder pain x long.  Pain is intermittent, mild-to-moderate intensity, exacerbating or relieving factors known.  Patient reports since she had a PICC line she is having pain in the right arm and shoulder.  Patient has been to ER for short-term pain and had x-rays done that was negative.        Review of Systems   Constitutional:  Positive for malaise/fatigue and weight loss (9 lb weight loss). Negative for chills, diaphoresis and fever.   HENT:  Negative for congestion, ear pain,  sinus pain, sore throat and tinnitus.    Eyes:  Negative for blurred vision and photophobia.   Respiratory:  Negative for cough, hemoptysis, shortness of breath and wheezing.    Cardiovascular:  Negative for chest pain, palpitations, orthopnea, leg swelling (ankle swelling is imrpoved) and PND.   Gastrointestinal:  Negative for abdominal pain, blood in stool, constipation, diarrhea, heartburn, melena, nausea and vomiting.   Genitourinary:  Negative for dysuria, frequency and urgency.   Musculoskeletal:  Negative for back pain, myalgias and neck pain.   Skin:  Negative for rash.   Neurological:  Negative for dizziness, tremors, seizures, loss of consciousness and weakness.   Endo/Heme/Allergies:  Negative for polydipsia.   Psychiatric/Behavioral:  Negative for depression and hallucinations. The patient does not have insomnia.    Objective:     Physical Exam  Constitutional:       General: She is not in acute distress.     Appearance: She is not diaphoretic.   Neck:      Thyroid: No thyromegaly.   Cardiovascular:      Rate and Rhythm: Normal rate and regular rhythm.      Heart sounds: Normal heart sounds. No murmur heard.  Pulmonary:      Effort: Pulmonary effort is normal. No respiratory distress.      Breath sounds: Normal breath sounds. No wheezing.      Comments: Previously reported her pain is much improved.  Abdominal:      General: Bowel sounds are normal. There is no distension.      Palpations: Abdomen is soft.      Tenderness: There is no abdominal tenderness.   Musculoskeletal:      Right lower leg: No edema.      Left lower leg: No edema.      Comments: Right shoulder is nontender to palpation range of motion is restricted especially on abduction.  Bilateral mid Arms 28 cm   Lymphadenopathy:      Cervical: No cervical adenopathy.   Neurological:      Mental Status: She is alert and oriented to person, place, and time.   Psychiatric:         Behavior: Behavior normal.         Thought Content: Thought  content normal.         Judgment: Judgment normal.     Assessment:       ICD-10-CM ICD-9-CM   1. Type 2 diabetes mellitus with diabetic polyneuropathy, with long-term current use of insulin  E11.42 250.60    Z79.4 357.2     V58.67   2. Chronic right shoulder pain  M25.511 719.41    G89.29 338.29   3. Colon cancer screening  Z12.11 V76.51       Plan:     Patient last A1c of 7.3 is markedly improved from 9.4  Patient has CGM place and the data suggests that patient is having multiple hypoglycemic episodes  Will decrease Lantus to 36 units once a day.   Will also decrease pre meal insulin to 16 units.   Patient blood pressures are on the lower side.   Will stop Hydralazine + take Lasix only as needed for edema  Patient last hemoglobin improved from 8.6-9.5.  Patient has iron deficiency and is on iron supplement and tolerating it well  Will repeat labs on return.  Patient was referred for screening colonoscopy but has not yet received an appointment  Patient last GFR is normal.  Patient is being followed by nephrologist.  Who continued to monitor  Patient has depression that seems under okay control.   Patient is too sleepy and is on multiple sedative medication.  Will hold Mirtazipine.   Patient takes multiple naps during the daytime  Discussed sleep hygiene with the patient.  Will get x-ray right shoulder + referred to Orthopedic for possible intra-articular injection.         Medication List with Changes/Refills   Current Medications    ACETAMINOPHEN (TYLENOL) 500 MG TABLET    Take 500 mg by mouth every 6 (six) hours as needed for Pain.    ASPIRIN 81 MG CHEW    CHEW AND SWALLOW 1 TABLET BY MOUTH ONCE DAILY    ATORVASTATIN (LIPITOR) 40 MG TABLET    Take 1 tablet (40 mg total) by mouth once daily.    BLOOD SUGAR DIAGNOSTIC (ONETOUCH ULTRA TEST) [facility]    CHECK BLOOD GLUCOSE 3 TIMES A DAY    BLOOD-GLUCOSE METER KIT    Use as instructed    DICLOFENAC SODIUM (SOLARAZE) 3 % GEL    Apply topically 2 (two) times daily.     "DULOXETINE (CYMBALTA) 30 MG CAPSULE    Take 30 mg by mouth once daily.    FERROUS SULFATE 325 (65 FE) MG EC TABLET    Take 1 tablet (325 mg total) by mouth once daily.    FLASH GLUCOSE SCANNING READER (FREESTYLE CRISTOBAL 2 READER) MISC    1 each by Misc.(Non-Drug; Combo Route) route once daily at 6am.    FLASH GLUCOSE SENSOR (FREESTYLE CRISTOBAL 2 SENSOR) KIT    USE 1 SENSOR EVERY 14 DAYS    FUROSEMIDE (LASIX) 40 MG TABLET    Take 1 tablet (40 mg total) by mouth once daily.    GABAPENTIN (NEURONTIN) 300 MG CAPSULE    TAKE 1 CAPSULE BY MOUTH THREE TIMES DAILY    INSULIN (LANTUS SOLOSTAR U-100 INSULIN) GLARGINE 100 UNITS/ML SUBQ PEN    Inject 20 Units into the skin 2 (two) times a day.    ISOSORBIDE MONONITRATE (IMDUR) 30 MG 24 HR TABLET    Take 1 tablet (30 mg total) by mouth once daily.    LANCETS 32 GAUGE MISC    1 lancet by Misc.(Non-Drug; Combo Route) route 3 (three) times daily.    LOPERAMIDE (IMODIUM) 2 MG CAPSULE    Take 2 mg by mouth 4 (four) times daily as needed for Diarrhea.    LOSARTAN (COZAAR) 100 MG TABLET    TAKE 1 TABLET BY MOUTH ONCE DAILY FOR 30 DAYS    METOCLOPRAMIDE HCL (REGLAN) 10 MG TABLET    Take 10 mg by mouth 4 (four) times daily before meals and nightly.    METOPROLOL TARTRATE (LOPRESSOR) 50 MG TABLET    Take 1 tablet (50 mg total) by mouth 2 (two) times daily.    MIRTAZAPINE (REMERON) 15 MG TABLET    Take 15 mg by mouth every evening.    MONTELUKAST (SINGULAIR) 10 MG TABLET    TAKE 1 TABLET BY MOUTH ONCE DAILY FOR 30 DAYS    PEN NEEDLE, DIABETIC (BD ULTRA-FINE SHORT PEN NEEDLE) 31 GAUGE X 5/16" NDLE    1 each by Misc.(Non-Drug; Combo Route) route 5 (five) times daily.    POTASSIUM CHLORIDE SA (K-DUR,KLOR-CON) 20 MEQ TABLET    Take 1 tablet (20 mEq total) by mouth 2 (two) times daily.    TIMOLOL MALEATE 0.5% (TIMOPTIC) 0.5 % DROP    1 drop 2 (two) times daily.   Discontinued Medications    HYDRALAZINE (APRESOLINE) 100 MG TABLET    Take 1 tablet (100 mg total) by mouth 2 (two) times daily.    " LORATADINE 10 MG CAP    1 tablet

## 2022-10-21 ENCOUNTER — CLINICAL SUPPORT (OUTPATIENT)
Dept: PRIMARY CARE CLINIC | Facility: CLINIC | Age: 62
End: 2022-10-21
Payer: MEDICAID

## 2022-10-21 VITALS — HEART RATE: 56 BPM | DIASTOLIC BLOOD PRESSURE: 65 MMHG | SYSTOLIC BLOOD PRESSURE: 142 MMHG

## 2022-10-30 LAB — HBA1C MFR BLD: 6.2 % (ref 4.2–6.3)

## 2022-10-31 LAB
ALBUMIN SERPL BCP-MCNC: 2.8 G/DL (ref 3.4–5)
ALP SERPL-CCNC: 60 U/L (ref 45–117)
ALT SERPL W P-5'-P-CCNC: 20 U/L (ref 13–56)
ANION GAP SERPL CALC-SCNC: 4 MMOL/L (ref 3–11)
AST SERPL-CCNC: 13 U/L (ref 15–37)
BILIRUB SERPL-MCNC: 0.2 MG/DL (ref 0.2–1)
BUN SERPL-MCNC: 23 MG/DL (ref 7–18)
BUN/CREAT RATIO: 17.42
CALCIUM SERPL-MCNC: 9.4 MG/DL (ref 8.5–10.1)
CHLORIDE SERPL-SCNC: 109 MMOL/L (ref 98–107)
CO2 SERPL-SCNC: 26 MMOL/L (ref 21–32)
CREAT SERPL-MCNC: 1.3 MG/DL (ref 0.6–1)
GFR ESTIMATION: 49 >60
GLUCOSE SERPL-MCNC: 175 MG/DL (ref 74–106)
POTASSIUM SERPL-SCNC: 4.5 MMOL/L (ref 3.5–5.1)
PROT SERPL-MCNC: 6.9 G/DL (ref 6.4–8.2)
SODIUM BLD-SCNC: 139 MMOL/L (ref 131–143)

## 2022-11-01 ENCOUNTER — TELEPHONE (OUTPATIENT)
Dept: PRIMARY CARE CLINIC | Facility: CLINIC | Age: 62
End: 2022-11-01
Payer: MEDICAID

## 2022-11-01 DIAGNOSIS — G72.81 CRITICAL ILLNESS MYOPATHY: Primary | ICD-10-CM

## 2022-11-01 NOTE — TELEPHONE ENCOUNTER
----- Message from Paz Payne sent at 11/1/2022  9:07 AM CDT -----  Regarding: referral request  Patient's daughter came into office requesting a referral to PT

## 2022-11-02 NOTE — TELEPHONE ENCOUNTER
----- Message from Tracy Henning RN sent at 10/31/2022  4:44 PM CDT -----  Contact: Malaika (daughter)    ----- Message -----  From: Rebekah Lemos  Sent: 10/31/2022   9:12 AM CDT  To: Priy[facility] Nowak Staff    Requesting a call back regarding referral for physical therapy. Please call back at [phone].

## 2022-11-02 NOTE — TELEPHONE ENCOUNTER
Returned call to patient's daughter, Malaika and she states that patient needs new referral for PT at [facility] (formerly [facility]3].

## 2022-11-03 ENCOUNTER — TELEPHONE (OUTPATIENT)
Dept: PRIMARY CARE CLINIC | Facility: CLINIC | Age: 62
End: 2022-11-03
Payer: MEDICAID

## 2022-11-16 ENCOUNTER — OFFICE VISIT (OUTPATIENT)
Dept: UROLOGY | Facility: CLINIC | Age: 62
End: 2022-11-16
Payer: MEDICAID

## 2022-11-16 ENCOUNTER — PATIENT MESSAGE (OUTPATIENT)
Dept: ADMINISTRATIVE | Facility: HOSPITAL | Age: 62
End: 2022-11-16
Payer: MEDICAID

## 2022-11-16 VITALS
HEIGHT: 59 IN | BODY MASS INDEX: 26.41 KG/M2 | HEART RATE: 62 BPM | SYSTOLIC BLOOD PRESSURE: 93 MMHG | DIASTOLIC BLOOD PRESSURE: 54 MMHG | WEIGHT: 131 LBS

## 2022-11-16 DIAGNOSIS — R33.9 URINARY RETENTION: Primary | ICD-10-CM

## 2022-11-16 PROCEDURE — 3066F PR DOCUMENTATION OF TREATMENT FOR NEPHROPATHY: ICD-10-PCS | Mod: CPTII,S$GLB,, | Performed by: NURSE PRACTITIONER

## 2022-11-16 PROCEDURE — 4010F ACE/ARB THERAPY RXD/TAKEN: CPT | Mod: CPTII,S$GLB,, | Performed by: NURSE PRACTITIONER

## 2022-11-16 PROCEDURE — 3074F SYST BP LT 130 MM HG: CPT | Mod: CPTII,S$GLB,, | Performed by: NURSE PRACTITIONER

## 2022-11-16 PROCEDURE — 3008F PR BODY MASS INDEX (BMI) DOCUMENTED: ICD-10-PCS | Mod: CPTII,S$GLB,, | Performed by: NURSE PRACTITIONER

## 2022-11-16 PROCEDURE — 1159F PR MEDICATION LIST DOCUMENTED IN MEDICAL RECORD: ICD-10-PCS | Mod: CPTII,S$GLB,, | Performed by: NURSE PRACTITIONER

## 2022-11-16 PROCEDURE — 3060F PR POS MICROALBUMINURIA RESULT DOCUMENTED/REVIEW: ICD-10-PCS | Mod: CPTII,S$GLB,, | Performed by: NURSE PRACTITIONER

## 2022-11-16 PROCEDURE — 1160F PR REVIEW ALL MEDS BY PRESCRIBER/CLIN PHARMACIST DOCUMENTED: ICD-10-PCS | Mod: CPTII,S$GLB,, | Performed by: NURSE PRACTITIONER

## 2022-11-16 PROCEDURE — 3078F PR MOST RECENT DIASTOLIC BLOOD PRESSURE < 80 MM HG: ICD-10-PCS | Mod: CPTII,S$GLB,, | Performed by: NURSE PRACTITIONER

## 2022-11-16 PROCEDURE — 3008F BODY MASS INDEX DOCD: CPT | Mod: CPTII,S$GLB,, | Performed by: NURSE PRACTITIONER

## 2022-11-16 PROCEDURE — 51701 INSERT BLADDER CATHETER: CPT | Mod: S$GLB,,, | Performed by: NURSE PRACTITIONER

## 2022-11-16 PROCEDURE — 3051F HG A1C>EQUAL 7.0%<8.0%: CPT | Mod: CPTII,S$GLB,, | Performed by: NURSE PRACTITIONER

## 2022-11-16 PROCEDURE — 1160F RVW MEDS BY RX/DR IN RCRD: CPT | Mod: CPTII,S$GLB,, | Performed by: NURSE PRACTITIONER

## 2022-11-16 PROCEDURE — 3051F PR MOST RECENT HEMOGLOBIN A1C LEVEL 7.0 - < 8.0%: ICD-10-PCS | Mod: CPTII,S$GLB,, | Performed by: NURSE PRACTITIONER

## 2022-11-16 PROCEDURE — 99214 OFFICE O/P EST MOD 30 MIN: CPT | Mod: 25,S$GLB,, | Performed by: NURSE PRACTITIONER

## 2022-11-16 PROCEDURE — 4010F PR ACE/ARB THEARPY RXD/TAKEN: ICD-10-PCS | Mod: CPTII,S$GLB,, | Performed by: NURSE PRACTITIONER

## 2022-11-16 PROCEDURE — 3066F NEPHROPATHY DOC TX: CPT | Mod: CPTII,S$GLB,, | Performed by: NURSE PRACTITIONER

## 2022-11-16 PROCEDURE — 99214 PR OFFICE/OUTPT VISIT, EST, LEVL IV, 30-39 MIN: ICD-10-PCS | Mod: 25,S$GLB,, | Performed by: NURSE PRACTITIONER

## 2022-11-16 PROCEDURE — 3074F PR MOST RECENT SYSTOLIC BLOOD PRESSURE < 130 MM HG: ICD-10-PCS | Mod: CPTII,S$GLB,, | Performed by: NURSE PRACTITIONER

## 2022-11-16 PROCEDURE — 51701 PR INSERTION OF NON-INDWELLING BLADDER CATHETERIZATION FOR RESIDUAL UR: ICD-10-PCS | Mod: S$GLB,,, | Performed by: NURSE PRACTITIONER

## 2022-11-16 PROCEDURE — 1159F MED LIST DOCD IN RCRD: CPT | Mod: CPTII,S$GLB,, | Performed by: NURSE PRACTITIONER

## 2022-11-16 PROCEDURE — 3060F POS MICROALBUMINURIA REV: CPT | Mod: CPTII,S$GLB,, | Performed by: NURSE PRACTITIONER

## 2022-11-16 PROCEDURE — 3078F DIAST BP <80 MM HG: CPT | Mod: CPTII,S$GLB,, | Performed by: NURSE PRACTITIONER

## 2022-11-16 RX ORDER — NITROFURANTOIN MACROCRYSTALS 50 MG/1
50 CAPSULE ORAL DAILY
Qty: 30 CAPSULE | Refills: 5 | Status: SHIPPED | OUTPATIENT
Start: 2022-11-16 | End: 2022-11-18

## 2022-11-16 RX ORDER — ESTRADIOL 0.1 MG/G
1 CREAM VAGINAL
Qty: 60 G | Refills: 2 | Status: SHIPPED | OUTPATIENT
Start: 2022-11-16 | End: 2022-11-18

## 2022-11-16 RX ORDER — LABETALOL 100 MG/1
100 TABLET, FILM COATED ORAL 2 TIMES DAILY
COMMUNITY
Start: 2022-11-02 | End: 2022-12-22

## 2022-11-16 RX ORDER — HYDRALAZINE HYDROCHLORIDE 100 MG/1
TABLET, FILM COATED ORAL
COMMUNITY
Start: 2022-11-14 | End: 2022-11-18

## 2022-11-16 RX ORDER — PANTOPRAZOLE SODIUM 40 MG/1
40 TABLET, DELAYED RELEASE ORAL DAILY
COMMUNITY
Start: 2022-11-02 | End: 2023-01-11

## 2022-11-16 NOTE — PROGRESS NOTES
Subjective:       Patient ID: Radha Hoffman is a 62 y.o. female.    Chief Complaint: Other (3 mth fu, recent uti while at [facility] for low sugar and bp)      HPI: 62-year-old female history of UTIs.  Reports she just completed antibiotics for UTI.  Her symptoms were dysuria.  She feels well today.  Although her voided urine specimen looks contaminated, will need a cath urine.  She is got numerous white cells skin cells and bacteria.  No blood.  Present she denies any flank pain frequency urgency dysuria or gross hematuria.       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 Abdalla    Stomach ulcer     Urinary retention     Urinary tract infection        Past Surgical Historical:   Past Surgical History:   Procedure Laterality Date    CATARACT EXTRACTION, BILATERAL      Catherization of heart      endo scopy      HYSTERECTOMY          Medications:   Medication List with Changes/Refills   Current Medications    ACETAMINOPHEN (TYLENOL) 500 MG TABLET    Take 500 mg by mouth every 6 (six) hours as needed for Pain.    ASPIRIN 81 MG CHEW    CHEW AND SWALLOW 1 TABLET BY MOUTH ONCE DAILY    ATORVASTATIN (LIPITOR) 40 MG TABLET    Take 1 tablet (40 mg total) by mouth once daily.    BLOOD SUGAR DIAGNOSTIC (ONETOUCH ULTRA TEST) [facility]    CHECK BLOOD GLUCOSE 3 TIMES A DAY    BLOOD-GLUCOSE METER KIT    Use as instructed    DICLOFENAC SODIUM (SOLARAZE) 3 % GEL    Apply topically 2 (two) times daily.    DULOXETINE (CYMBALTA) 30 MG CAPSULE    Take 30 mg by mouth once daily.    FERROUS SULFATE 325 (65 FE) MG EC TABLET     "Take 1 tablet (325 mg total) by mouth once daily.    FLASH GLUCOSE SCANNING READER (FREESTYLE CRISTOBAL 2 READER) MISC    1 each by Misc.(Non-Drug; Combo Route) route once daily at 6am.    FLASH GLUCOSE SENSOR (FREESTYLE CRISTOBAL 2 SENSOR) KIT    USE 1 SENSOR EVERY 14 DAYS    FUROSEMIDE (LASIX) 40 MG TABLET    Take 1 tablet (40 mg total) by mouth once daily.    GABAPENTIN (NEURONTIN) 300 MG CAPSULE    TAKE 1 CAPSULE BY MOUTH THREE TIMES DAILY    HYDRALAZINE (APRESOLINE) 100 MG TABLET        INSULIN (LANTUS SOLOSTAR U-100 INSULIN) GLARGINE 100 UNITS/ML SUBQ PEN    Inject 20 Units into the skin 2 (two) times a day.    ISOSORBIDE MONONITRATE (IMDUR) 30 MG 24 HR TABLET    Take 1 tablet (30 mg total) by mouth once daily.    LABETALOL (NORMODYNE) 100 MG TABLET    Take 100 mg by mouth 2 (two) times daily.    LANCETS 32 GAUGE MISC    1 lancet by Misc.(Non-Drug; Combo Route) route 3 (three) times daily.    LOPERAMIDE (IMODIUM) 2 MG CAPSULE    Take 2 mg by mouth 4 (four) times daily as needed for Diarrhea.    LOSARTAN (COZAAR) 100 MG TABLET    TAKE 1 TABLET BY MOUTH ONCE DAILY FOR 30 DAYS    METOCLOPRAMIDE HCL (REGLAN) 10 MG TABLET    Take 10 mg by mouth 4 (four) times daily before meals and nightly.    MIRTAZAPINE (REMERON) 15 MG TABLET    Take 15 mg by mouth every evening.    MONTELUKAST (SINGULAIR) 10 MG TABLET    TAKE 1 TABLET BY MOUTH ONCE DAILY FOR 30 DAYS    PANTOPRAZOLE (PROTONIX) 40 MG TABLET    Take 40 mg by mouth once daily.    PEN NEEDLE, DIABETIC (BD ULTRA-FINE SHORT PEN NEEDLE) 31 GAUGE X 5/16" NDLE    1 each by Misc.(Non-Drug; Combo Route) route 5 (five) times daily.    POTASSIUM CHLORIDE SA (K-DUR,KLOR-CON) 20 MEQ TABLET    Take 1 tablet (20 mEq total) by mouth 2 (two) times daily.    TIMOLOL MALEATE 0.5% (TIMOPTIC) 0.5 % DROP    1 drop 2 (two) times daily.   Discontinued Medications    METOPROLOL TARTRATE (LOPRESSOR) 50 MG TABLET    Take 1 tablet (50 mg total) by mouth 2 (two) times daily.        Past Social " History:   Social History     Socioeconomic History    Marital status: Single    Number of children: 3   Occupational History    Occupation: nurses aide   Tobacco Use    Smoking status: Never    Smokeless tobacco: Never   Substance and Sexual Activity    Alcohol use: No    Drug use: No    Sexual activity: Never       Allergies:   Review of patient's allergies indicates:   Allergen Reactions    Metformin     Phenazopyridine     Sulfa (sulfonamide antibiotics)         Family History:   Family History   Problem Relation Age of Onset    Arthritis Mother     Depression Mother     Diabetes Mother     Heart disease Mother     Hypertension Mother     Arthritis Father     Depression Father     Arthritis Sister     Diabetes Sister     Arthritis Brother         Review of Systems:  Review of Systems   Constitutional:  Negative for activity change and appetite change.   HENT:  Negative for congestion and dental problem.    Respiratory:  Negative for chest tightness and shortness of breath.    Cardiovascular:  Negative for chest pain.   Gastrointestinal:  Negative for abdominal distention and abdominal pain.   Genitourinary:  Negative for decreased urine volume, difficulty urinating, dyspareunia, dysuria, enuresis, flank pain, frequency, genital sores, hematuria, pelvic pain and urgency.   Musculoskeletal:  Negative for back pain and neck pain.   Allergic/Immunologic: Negative for immunocompromised state.   Neurological:  Negative for dizziness.   Hematological:  Negative for adenopathy.   Psychiatric/Behavioral:  Negative for agitation, behavioral problems and confusion.      Physical Exam:  Physical Exam  Constitutional:       Appearance: She is well-developed.   HENT:      Head: Normocephalic and atraumatic.   Eyes:      General: No scleral icterus.  Pulmonary:      Effort: Pulmonary effort is normal.      Breath sounds: Normal breath sounds.   Abdominal:      General: There is no distension.      Palpations: Abdomen is soft.       Tenderness: There is no abdominal tenderness.   Genitourinary:     Exam position: Supine.      Labia:         Right: No rash, tenderness or lesion.         Left: No rash, tenderness or lesion.       Vagina: Normal.      Rectum: Normal.   Musculoskeletal:      Cervical back: Normal range of motion.   Skin:     General: Skin is warm and dry.   Neurological:      Mental Status: She is alert and oriented to person, place, and time.       Assessment/Plan:   Frequent UTI--voided specimen today numerous white cells, skin cells and bacteria with no blood.  Cath UA--3-5 white cells, no red cells no skin cells no bacteria.  cath PVR--40 mL.  Patient has no the female cancers IE breast uterine ovarian or cervical cancer.  We are going to start her on Estrace vaginal cream risk versus benefits discussed she is in agreement with.  Will also put her on low-dose maintenance antibiotic till the cream has time to take effect and see her back in 3 months  Problem List Items Addressed This Visit          Renal/    Urinary retention - Primary    Relevant Orders    POCT Urinalysis (w/Micro Option)

## 2022-11-18 ENCOUNTER — OFFICE VISIT (OUTPATIENT)
Dept: PRIMARY CARE CLINIC | Facility: CLINIC | Age: 62
End: 2022-11-18
Payer: MEDICAID

## 2022-11-18 VITALS
SYSTOLIC BLOOD PRESSURE: 103 MMHG | TEMPERATURE: 97 F | WEIGHT: 128 LBS | HEART RATE: 57 BPM | RESPIRATION RATE: 16 BRPM | OXYGEN SATURATION: 97 % | DIASTOLIC BLOOD PRESSURE: 56 MMHG | HEIGHT: 55 IN | BODY MASS INDEX: 29.62 KG/M2

## 2022-11-18 DIAGNOSIS — E11.42 TYPE 2 DIABETES MELLITUS WITH DIABETIC POLYNEUROPATHY, WITH LONG-TERM CURRENT USE OF INSULIN: Primary | ICD-10-CM

## 2022-11-18 DIAGNOSIS — R19.7 DIARRHEA OF PRESUMED INFECTIOUS ORIGIN: ICD-10-CM

## 2022-11-18 DIAGNOSIS — I25.118 CORONARY ARTERY DISEASE OF NATIVE ARTERY OF NATIVE HEART WITH STABLE ANGINA PECTORIS: ICD-10-CM

## 2022-11-18 DIAGNOSIS — Z79.4 TYPE 2 DIABETES MELLITUS WITH DIABETIC POLYNEUROPATHY, WITH LONG-TERM CURRENT USE OF INSULIN: Primary | ICD-10-CM

## 2022-11-18 DIAGNOSIS — I10 BENIGN ESSENTIAL HTN: ICD-10-CM

## 2022-11-18 PROCEDURE — 3060F PR POS MICROALBUMINURIA RESULT DOCUMENTED/REVIEW: ICD-10-PCS | Mod: CPTII,S$GLB,, | Performed by: INTERNAL MEDICINE

## 2022-11-18 PROCEDURE — 95251 PR GLUCOSE MONITOR, 72 HOUR, PHYS INTERP: ICD-10-PCS | Mod: S$GLB,,, | Performed by: INTERNAL MEDICINE

## 2022-11-18 PROCEDURE — 3051F HG A1C>EQUAL 7.0%<8.0%: CPT | Mod: CPTII,S$GLB,, | Performed by: INTERNAL MEDICINE

## 2022-11-18 PROCEDURE — 3060F POS MICROALBUMINURIA REV: CPT | Mod: CPTII,S$GLB,, | Performed by: INTERNAL MEDICINE

## 2022-11-18 PROCEDURE — 3078F DIAST BP <80 MM HG: CPT | Mod: CPTII,S$GLB,, | Performed by: INTERNAL MEDICINE

## 2022-11-18 PROCEDURE — 3074F SYST BP LT 130 MM HG: CPT | Mod: CPTII,S$GLB,, | Performed by: INTERNAL MEDICINE

## 2022-11-18 PROCEDURE — 99214 PR OFFICE/OUTPT VISIT, EST, LEVL IV, 30-39 MIN: ICD-10-PCS | Mod: S$GLB,,, | Performed by: INTERNAL MEDICINE

## 2022-11-18 PROCEDURE — 3066F NEPHROPATHY DOC TX: CPT | Mod: CPTII,S$GLB,, | Performed by: INTERNAL MEDICINE

## 2022-11-18 PROCEDURE — 3008F BODY MASS INDEX DOCD: CPT | Mod: CPTII,S$GLB,, | Performed by: INTERNAL MEDICINE

## 2022-11-18 PROCEDURE — 1160F PR REVIEW ALL MEDS BY PRESCRIBER/CLIN PHARMACIST DOCUMENTED: ICD-10-PCS | Mod: CPTII,S$GLB,, | Performed by: INTERNAL MEDICINE

## 2022-11-18 PROCEDURE — 95251 CONT GLUC MNTR ANALYSIS I&R: CPT | Mod: S$GLB,,, | Performed by: INTERNAL MEDICINE

## 2022-11-18 PROCEDURE — 3074F PR MOST RECENT SYSTOLIC BLOOD PRESSURE < 130 MM HG: ICD-10-PCS | Mod: CPTII,S$GLB,, | Performed by: INTERNAL MEDICINE

## 2022-11-18 PROCEDURE — 4010F PR ACE/ARB THEARPY RXD/TAKEN: ICD-10-PCS | Mod: CPTII,S$GLB,, | Performed by: INTERNAL MEDICINE

## 2022-11-18 PROCEDURE — 3078F PR MOST RECENT DIASTOLIC BLOOD PRESSURE < 80 MM HG: ICD-10-PCS | Mod: CPTII,S$GLB,, | Performed by: INTERNAL MEDICINE

## 2022-11-18 PROCEDURE — 1159F MED LIST DOCD IN RCRD: CPT | Mod: CPTII,S$GLB,, | Performed by: INTERNAL MEDICINE

## 2022-11-18 PROCEDURE — 3051F PR MOST RECENT HEMOGLOBIN A1C LEVEL 7.0 - < 8.0%: ICD-10-PCS | Mod: CPTII,S$GLB,, | Performed by: INTERNAL MEDICINE

## 2022-11-18 PROCEDURE — 1160F RVW MEDS BY RX/DR IN RCRD: CPT | Mod: CPTII,S$GLB,, | Performed by: INTERNAL MEDICINE

## 2022-11-18 PROCEDURE — 3066F PR DOCUMENTATION OF TREATMENT FOR NEPHROPATHY: ICD-10-PCS | Mod: CPTII,S$GLB,, | Performed by: INTERNAL MEDICINE

## 2022-11-18 PROCEDURE — 4010F ACE/ARB THERAPY RXD/TAKEN: CPT | Mod: CPTII,S$GLB,, | Performed by: INTERNAL MEDICINE

## 2022-11-18 PROCEDURE — 3008F PR BODY MASS INDEX (BMI) DOCUMENTED: ICD-10-PCS | Mod: CPTII,S$GLB,, | Performed by: INTERNAL MEDICINE

## 2022-11-18 PROCEDURE — 1159F PR MEDICATION LIST DOCUMENTED IN MEDICAL RECORD: ICD-10-PCS | Mod: CPTII,S$GLB,, | Performed by: INTERNAL MEDICINE

## 2022-11-18 PROCEDURE — 99214 OFFICE O/P EST MOD 30 MIN: CPT | Mod: S$GLB,,, | Performed by: INTERNAL MEDICINE

## 2022-11-18 RX ORDER — HYDRALAZINE HYDROCHLORIDE 50 MG/1
50 TABLET, FILM COATED ORAL EVERY 12 HOURS
Qty: 60 TABLET | Refills: 11 | Status: SHIPPED | OUTPATIENT
Start: 2022-11-18 | End: 2022-12-22

## 2022-11-18 NOTE — PROGRESS NOTES
Subjective:      Patient ID: Radha Hoffman is a 62 y.o. female.    Chief Complaint: Follow-up (F/u from [facility] D/c on 10/30/22 for UTI, hypoglycemia) and Diabetes (1month f/u )     Patient with h/o DM with last A1c-= 7.3 improved from 9.4.  Patient reports better adherence with diet and medication.  Patient has CGM and the data is downloaded and average blood sugar is  97 with 65% of the time blood sugar is in target range.  Blood sugars are running high 6% of the time but has lows 14% of the time (between 54 and 69) and very low less than 54 15% of the time.  Patient was recently taken to ER secondary to hypoglycemia with blood sugar reaching 37.  Patient is currently taking Lantus 10 units twice a day and Humalog 3 units before each meal.  Patient blood sugars seem to be running on the lower side in the morning reaching 70s and running high after meals.  Patient is better adherent with diet    In-hospital patient was also diagnosed to have UTI and was treated with ciprofloxacin.  Patient daughter reports patient having diarrhea after completing antibiotics.  Diarrhea is mild watery but frequent, no fever or chills.       Patient is taking Lantus 20 units twice a day and Humalog 20 units before each meal.  Multiple hypoglycemic episodes are probably secondary to moderate tenderness with diet and medication.  Patient takes gabapentin for neuropathy involving bilateral feet      Patient with h/o non ST elevation MI + CTA was positive for RCA/lad disease along with 4 vessel disease.  Angiogram was done by Dr. Barrios, that showed moderate 3 vessel coronary artery disease.  No stents were placed and medical management was recommended.  Patient is on aspirin, beta blocker, ARB, statin + isosorbide.  Blood pressures are running low probably better tenderness with medication.  Patient denies fatigue, dizziness or lightheadedness    Patient reports depression is under okay control.  Patient is not being followed  by a psychiatrist.  Patient is on Cymbalta and Mirtazipine. Patient reports feeling sleepy through out the day and has multiple naps.        Review of Systems   Constitutional:  Positive for malaise/fatigue and weight loss (3 lb weight loss). Negative for chills, diaphoresis and fever.   HENT:  Negative for congestion, ear pain, sinus pain, sore throat and tinnitus.    Eyes:  Negative for blurred vision and photophobia.   Respiratory:  Negative for cough, hemoptysis, shortness of breath and wheezing.    Cardiovascular:  Negative for chest pain, palpitations, orthopnea, leg swelling (ankle swelling is imrpoved) and PND.   Gastrointestinal:  Positive for diarrhea. Negative for abdominal pain, blood in stool, constipation, heartburn, melena, nausea and vomiting.   Genitourinary:  Negative for dysuria, frequency and urgency.   Musculoskeletal:  Negative for back pain, myalgias and neck pain.   Skin:  Negative for rash.   Neurological:  Negative for dizziness, tremors, seizures, loss of consciousness and weakness.   Endo/Heme/Allergies:  Negative for polydipsia.   Psychiatric/Behavioral:  Negative for depression and hallucinations. The patient does not have insomnia.    Objective:     Physical Exam  Constitutional:       General: She is not in acute distress.     Appearance: She is not diaphoretic.   Neck:      Thyroid: No thyromegaly.   Cardiovascular:      Rate and Rhythm: Normal rate and regular rhythm.      Heart sounds: Normal heart sounds. No murmur heard.  Pulmonary:      Effort: Pulmonary effort is normal. No respiratory distress.      Breath sounds: Normal breath sounds. No wheezing.   Abdominal:      General: Bowel sounds are normal. There is no distension.      Palpations: Abdomen is soft.      Tenderness: There is no abdominal tenderness.   Musculoskeletal:      Right lower leg: No edema.      Left lower leg: No edema.   Lymphadenopathy:      Cervical: No cervical adenopathy.   Neurological:      Mental  Status: She is alert and oriented to person, place, and time.   Psychiatric:         Behavior: Behavior normal.         Thought Content: Thought content normal.         Judgment: Judgment normal.     Assessment:       ICD-10-CM ICD-9-CM   1. Type 2 diabetes mellitus with diabetic polyneuropathy, with long-term current use of insulin  E11.42 250.60    Z79.4 357.2     V58.67   2. Benign essential HTN  I10 401.1   3. Diarrhea of presumed infectious origin  R19.7 009.3         Plan:     Patient last A1c of 7.3 is markedly improved from 9.4  Patient CGM data suggest that patient blood sugars are running low reaching 70s from midnight to 6:00 a.m.  Blood sugar slightly high after lunch and dinner  Will decrease Lantus from 20 units a day to 16 units  Advised to monitor blood sugars at home and if blood sugar specially fasting stay less than 100 decrease Lantus to 14 units  Will increase Humalog to 5 units before lunch  Continue 3 units before dinner.  Patient has multiple hypoglycemic episode resulting in multiple ER visits + admission  I think patient will be helped by dex com markedly..  Will try to get dex com for patient  Patient blood pressures are running on lower side will stop hydralazine  Patient is given 50 units of hydralazine as previously reported high blood pressure after stopping this medicine  Advised to take the medicine if blood pressure go higher than 140/90.  Patient was recently admitted with UTI and was treated with ciprofloxacin  Patient daughter reports diarrhea after antibiotics.  Will check for C diff  Patient has depression that seems to be doing better  Patient wants to continue same dose of Cymbalta at this time.   Patient is using walker for mobility and is undergoing physical therapy  Patient requested for wheelchair..  Advised patient to avoid wheelchair and try to walk more with walker  She is young and should be able to get off of walker.  Patient has history of coronary artery disease  medical management is planned        Medication List with Changes/Refills   New Medications    HYDRALAZINE (APRESOLINE) 50 MG TABLET    Take 1 tablet (50 mg total) by mouth every 12 (twelve) hours.   Current Medications    ACETAMINOPHEN (TYLENOL) 500 MG TABLET    Take 500 mg by mouth every 6 (six) hours as needed for Pain.    ASPIRIN 81 MG CHEW    CHEW AND SWALLOW 1 TABLET BY MOUTH ONCE DAILY    ATORVASTATIN (LIPITOR) 40 MG TABLET    Take 1 tablet (40 mg total) by mouth once daily.    BLOOD SUGAR DIAGNOSTIC (ONETOUCH ULTRA TEST) [facility]    CHECK BLOOD GLUCOSE 3 TIMES A DAY    BLOOD-GLUCOSE METER KIT    Use as instructed    DICLOFENAC SODIUM (SOLARAZE) 3 % GEL    Apply topically 2 (two) times daily.    DULOXETINE (CYMBALTA) 30 MG CAPSULE    Take 30 mg by mouth once daily.    FERROUS SULFATE 325 (65 FE) MG EC TABLET    Take 1 tablet (325 mg total) by mouth once daily.    FLASH GLUCOSE SCANNING READER (FREESTYLE CRISTOBAL 2 READER) MISC    1 each by Misc.(Non-Drug; Combo Route) route once daily at 6am.    FLASH GLUCOSE SENSOR (FREESTYLE CRISTOBAL 2 SENSOR) KIT    USE 1 SENSOR EVERY 14 DAYS    FUROSEMIDE (LASIX) 40 MG TABLET    Take 1 tablet (40 mg total) by mouth once daily.    GABAPENTIN (NEURONTIN) 300 MG CAPSULE    TAKE 1 CAPSULE BY MOUTH THREE TIMES DAILY    INSULIN (LANTUS SOLOSTAR U-100 INSULIN) GLARGINE 100 UNITS/ML SUBQ PEN    Inject 20 Units into the skin 2 (two) times a day.    ISOSORBIDE MONONITRATE (IMDUR) 30 MG 24 HR TABLET    Take 1 tablet (30 mg total) by mouth once daily.    LABETALOL (NORMODYNE) 100 MG TABLET    Take 100 mg by mouth 2 (two) times daily.    LANCETS 32 GAUGE MISC    1 lancet by Misc.(Non-Drug; Combo Route) route 3 (three) times daily.    LOPERAMIDE (IMODIUM) 2 MG CAPSULE    Take 2 mg by mouth 4 (four) times daily as needed for Diarrhea.    LOSARTAN (COZAAR) 100 MG TABLET    TAKE 1 TABLET BY MOUTH ONCE DAILY FOR 30 DAYS    METOCLOPRAMIDE HCL (REGLAN) 10 MG TABLET    Take 10 mg by mouth 4 (four)  "times daily before meals and nightly.    MIRTAZAPINE (REMERON) 15 MG TABLET    Take 15 mg by mouth every evening.    MONTELUKAST (SINGULAIR) 10 MG TABLET    TAKE 1 TABLET BY MOUTH ONCE DAILY FOR 30 DAYS    PANTOPRAZOLE (PROTONIX) 40 MG TABLET    Take 40 mg by mouth once daily.    PEN NEEDLE, DIABETIC (BD ULTRA-FINE SHORT PEN NEEDLE) 31 GAUGE X 5/16" NDLE    1 each by Misc.(Non-Drug; Combo Route) route 5 (five) times daily.    POTASSIUM CHLORIDE SA (K-DUR,KLOR-CON) 20 MEQ TABLET    Take 1 tablet (20 mEq total) by mouth 2 (two) times daily.    TIMOLOL MALEATE 0.5% (TIMOPTIC) 0.5 % DROP    1 drop 2 (two) times daily.   Discontinued Medications    ESTRADIOL (ESTRACE) 0.01 % (0.1 MG/GRAM) VAGINAL CREAM    Place 1 g vaginally every Mon, Wed, Fri.    HYDRALAZINE (APRESOLINE) 100 MG TABLET        NITROFURANTOIN (MACRODANTIN) 50 MG CAPSULE    Take 1 capsule (50 mg total) by mouth Daily.                                      "

## 2022-11-22 LAB — C DIFF TOX GENS STL QL NAA+PROBE: NOT DETECTED

## 2022-11-28 ENCOUNTER — TELEPHONE (OUTPATIENT)
Dept: PRIMARY CARE CLINIC | Facility: CLINIC | Age: 62
End: 2022-11-28

## 2022-11-28 NOTE — TELEPHONE ENCOUNTER
----- Message from Ivory Pruitt sent at 11/28/2022  9:41 AM CST -----  Type:  Needs Medical Advice    Who Called: Radha Hoffman ( pt's dtr, Leanna )     Symptoms (please be specific): -   How long has patient had these symptoms:  -  Pharmacy name and phone #:  -  Would the patient rather a call back or a response via [facility]?    Best Call Back Number: [phone]  Additional Information: says they are still waiting for the orders for outpatient PT to be sent to [facility]

## 2022-12-08 ENCOUNTER — TELEPHONE (OUTPATIENT)
Dept: PRIMARY CARE CLINIC | Facility: CLINIC | Age: 62
End: 2022-12-08
Payer: MEDICAID

## 2022-12-08 NOTE — TELEPHONE ENCOUNTER
----- Message from Tara Garibay MA sent at 12/7/2022 11:54 AM CST -----  Referral to PT.  ----- Message -----  From: Juliette Matute LPN  Sent: 12/5/2022   4:54 PM CST  To: Vasquez Shelton MA, Tara Garibay MA, #      ----- Message -----  From: Ivory Pruitt  Sent: 12/5/2022   9:31 AM CST  To: Priyanka Nowak Staff    Type:  Needs Medical Advice    Who Called: Radha Hoffman  ( pt's dtr Leanna )     Symptoms (please be specific): -   How long has patient had these symptoms:  -  Pharmacy name and phone #:  -  Would the patient rather a call back or a response via [facility]? -  Best Call Back Number: [phone]  Additional Information: the correct number to send the orders for PT/ OT to is [phone]

## 2022-12-12 ENCOUNTER — TELEPHONE (OUTPATIENT)
Dept: UROLOGY | Facility: CLINIC | Age: 62
End: 2022-12-12
Payer: MEDICAID

## 2022-12-12 NOTE — TELEPHONE ENCOUNTER
----- Message from Kathy Medina sent at 12/12/2022  3:49 PM CST -----  Contact: Leanna Hoffman (Daughter)  Type:  Same Day Appointment Request    Caller is requesting a same day appointment.  Caller declined first available appointment listed below.    Name of Caller:Leanna Hoffman (Daughter) of Radha Dillon  When is the first available appointment?1/2023  Symptoms: feeling ill with back pain , cloudy urine , can barley walk      Best Call Back Number: [phone]     Additional Information: na

## 2022-12-13 ENCOUNTER — CLINICAL SUPPORT (OUTPATIENT)
Dept: UROLOGY | Facility: CLINIC | Age: 62
End: 2022-12-13
Payer: MEDICAID

## 2022-12-13 DIAGNOSIS — R82.90 ABNORMAL URINALYSIS: Primary | ICD-10-CM

## 2022-12-14 ENCOUNTER — TELEPHONE (OUTPATIENT)
Dept: UROLOGY | Facility: CLINIC | Age: 62
End: 2022-12-14
Payer: MEDICAID

## 2022-12-14 NOTE — TELEPHONE ENCOUNTER
This daughter not on involvment of care nor POA, I advised I would call pt but she reports pt at PT and does not have vm.

## 2022-12-14 NOTE — TELEPHONE ENCOUNTER
----- Message from Rebekah Lemos sent at 12/14/2022  1:55 PM CST -----  Contact: Leanna (Daughter)  Type:  Test Results    Who Called: Leanna (Daughter)  Name of Test (Lab/Mammo/Etc):  Urine drop off  Date of Test: 12/13/2022  Ordering Provider:  Sam  Where the test was performed:  At the office - drop off  Would the patient rather a call back or a response via [facility]? Call back   Best Call Back Number: [phone]  Additional Information:  N/a

## 2022-12-16 ENCOUNTER — TELEPHONE (OUTPATIENT)
Dept: UROLOGY | Facility: CLINIC | Age: 62
End: 2022-12-16
Payer: MEDICAID

## 2022-12-16 RX ORDER — NITROFURANTOIN 25; 75 MG/1; MG/1
100 CAPSULE ORAL 2 TIMES DAILY
Qty: 28 CAPSULE | Refills: 0 | Status: SHIPPED | OUTPATIENT
Start: 2022-12-16 | End: 2022-12-22

## 2022-12-19 LAB — URINE CULTURE, ROUTINE: NORMAL

## 2022-12-22 ENCOUNTER — OFFICE VISIT (OUTPATIENT)
Dept: PRIMARY CARE CLINIC | Facility: CLINIC | Age: 62
End: 2022-12-22
Payer: MEDICAID

## 2022-12-22 VITALS
BODY MASS INDEX: 31.06 KG/M2 | WEIGHT: 134.19 LBS | OXYGEN SATURATION: 98 % | HEIGHT: 55 IN | TEMPERATURE: 97 F | HEART RATE: 54 BPM | SYSTOLIC BLOOD PRESSURE: 102 MMHG | DIASTOLIC BLOOD PRESSURE: 64 MMHG

## 2022-12-22 DIAGNOSIS — I10 BENIGN ESSENTIAL HTN: ICD-10-CM

## 2022-12-22 DIAGNOSIS — E11.42 TYPE 2 DIABETES MELLITUS WITH DIABETIC POLYNEUROPATHY, WITH LONG-TERM CURRENT USE OF INSULIN: Primary | ICD-10-CM

## 2022-12-22 DIAGNOSIS — N39.0 RECURRENT UTI: ICD-10-CM

## 2022-12-22 DIAGNOSIS — Z79.4 TYPE 2 DIABETES MELLITUS WITH DIABETIC POLYNEUROPATHY, WITH LONG-TERM CURRENT USE OF INSULIN: Primary | ICD-10-CM

## 2022-12-22 PROCEDURE — 3078F PR MOST RECENT DIASTOLIC BLOOD PRESSURE < 80 MM HG: ICD-10-PCS | Mod: CPTII,S$GLB,, | Performed by: INTERNAL MEDICINE

## 2022-12-22 PROCEDURE — 3074F SYST BP LT 130 MM HG: CPT | Mod: CPTII,S$GLB,, | Performed by: INTERNAL MEDICINE

## 2022-12-22 PROCEDURE — 3051F HG A1C>EQUAL 7.0%<8.0%: CPT | Mod: CPTII,S$GLB,, | Performed by: INTERNAL MEDICINE

## 2022-12-22 PROCEDURE — 3066F NEPHROPATHY DOC TX: CPT | Mod: CPTII,S$GLB,, | Performed by: INTERNAL MEDICINE

## 2022-12-22 PROCEDURE — 1159F MED LIST DOCD IN RCRD: CPT | Mod: CPTII,S$GLB,, | Performed by: INTERNAL MEDICINE

## 2022-12-22 PROCEDURE — 99214 OFFICE O/P EST MOD 30 MIN: CPT | Mod: S$GLB,,, | Performed by: INTERNAL MEDICINE

## 2022-12-22 PROCEDURE — 3008F BODY MASS INDEX DOCD: CPT | Mod: CPTII,S$GLB,, | Performed by: INTERNAL MEDICINE

## 2022-12-22 PROCEDURE — 3051F PR MOST RECENT HEMOGLOBIN A1C LEVEL 7.0 - < 8.0%: ICD-10-PCS | Mod: CPTII,S$GLB,, | Performed by: INTERNAL MEDICINE

## 2022-12-22 PROCEDURE — 1159F PR MEDICATION LIST DOCUMENTED IN MEDICAL RECORD: ICD-10-PCS | Mod: CPTII,S$GLB,, | Performed by: INTERNAL MEDICINE

## 2022-12-22 PROCEDURE — 3008F PR BODY MASS INDEX (BMI) DOCUMENTED: ICD-10-PCS | Mod: CPTII,S$GLB,, | Performed by: INTERNAL MEDICINE

## 2022-12-22 PROCEDURE — 3060F PR POS MICROALBUMINURIA RESULT DOCUMENTED/REVIEW: ICD-10-PCS | Mod: CPTII,S$GLB,, | Performed by: INTERNAL MEDICINE

## 2022-12-22 PROCEDURE — 3066F PR DOCUMENTATION OF TREATMENT FOR NEPHROPATHY: ICD-10-PCS | Mod: CPTII,S$GLB,, | Performed by: INTERNAL MEDICINE

## 2022-12-22 PROCEDURE — 1160F RVW MEDS BY RX/DR IN RCRD: CPT | Mod: CPTII,S$GLB,, | Performed by: INTERNAL MEDICINE

## 2022-12-22 PROCEDURE — 95251 CONT GLUC MNTR ANALYSIS I&R: CPT | Mod: S$GLB,,, | Performed by: INTERNAL MEDICINE

## 2022-12-22 PROCEDURE — 3078F DIAST BP <80 MM HG: CPT | Mod: CPTII,S$GLB,, | Performed by: INTERNAL MEDICINE

## 2022-12-22 PROCEDURE — 4010F ACE/ARB THERAPY RXD/TAKEN: CPT | Mod: CPTII,S$GLB,, | Performed by: INTERNAL MEDICINE

## 2022-12-22 PROCEDURE — 3060F POS MICROALBUMINURIA REV: CPT | Mod: CPTII,S$GLB,, | Performed by: INTERNAL MEDICINE

## 2022-12-22 PROCEDURE — 4010F PR ACE/ARB THEARPY RXD/TAKEN: ICD-10-PCS | Mod: CPTII,S$GLB,, | Performed by: INTERNAL MEDICINE

## 2022-12-22 PROCEDURE — 3074F PR MOST RECENT SYSTOLIC BLOOD PRESSURE < 130 MM HG: ICD-10-PCS | Mod: CPTII,S$GLB,, | Performed by: INTERNAL MEDICINE

## 2022-12-22 PROCEDURE — 95251 PR GLUCOSE MONITOR, 72 HOUR, PHYS INTERP: ICD-10-PCS | Mod: S$GLB,,, | Performed by: INTERNAL MEDICINE

## 2022-12-22 PROCEDURE — 1160F PR REVIEW ALL MEDS BY PRESCRIBER/CLIN PHARMACIST DOCUMENTED: ICD-10-PCS | Mod: CPTII,S$GLB,, | Performed by: INTERNAL MEDICINE

## 2022-12-22 PROCEDURE — 99214 PR OFFICE/OUTPT VISIT, EST, LEVL IV, 30-39 MIN: ICD-10-PCS | Mod: S$GLB,,, | Performed by: INTERNAL MEDICINE

## 2022-12-22 RX ORDER — METOPROLOL TARTRATE 50 MG/1
50 TABLET ORAL 2 TIMES DAILY
COMMUNITY
Start: 2022-12-15 | End: 2023-01-11

## 2022-12-22 RX ORDER — CEPHALEXIN 250 MG/1
250 CAPSULE ORAL
COMMUNITY
Start: 2022-12-02 | End: 2022-12-29 | Stop reason: SDUPTHER

## 2022-12-22 NOTE — PROGRESS NOTES
Subjective:      Patient ID: Radha Hoffman is a 62 y.o. female.    Chief Complaint: Hospital Follow Up and Hypotension    HPI: Patient with h/o DM with last A1c-= 7.3 improved from 9.4.  Patient reports better adherence with diet and medication.  Patient is checking blood sugar frequently and reports fasting blood sugars =   but reports time when blood sugar early in the morning dropped to 60s.  Patient reports taking Lantus 20 units once a day.  Patient blood sugar after breakfast and lunch running are under okay control but after dinner blood sugars seem to be running high reaching 250.  Patient is taking 5 units of Humalog before each meal.  Patient reports better adherence with diet and medication.  Patient has CGM on and that data is downloaded and it suggest that patient blood sugars are under good control 67% of the time and are running high 26% of the time and very high more than 257% of the time.  A few hypoglycemic episodes are noted early in the morning.  High blood sugars are seen after dinner    Patient was recently admitted to hospital secondary to hypotension, urosepsis, and acute kidney injury.  Patient was referred by Dr. Florez to hospital secondary to hypotension and confusion.  Patient was treated with broad-spectrum antibiotics and IV hydration with much improvement in symptoms.  Patient had CT that showed right-sided hydronephrosis similar to the previous study.  Also showed left-sided hydronephrosis with dilation of left ureter, no stones noted at this time.  Patient has an appointment with urologist next week.      Review of Systems   Constitutional:  Negative for chills, diaphoresis, fever, malaise/fatigue and weight loss.   HENT:  Negative for congestion, ear pain, sinus pain, sore throat and tinnitus.    Eyes:  Negative for blurred vision and photophobia.   Respiratory:  Negative for cough, hemoptysis, shortness of breath and wheezing.    Cardiovascular:  Negative for chest  pain, palpitations, orthopnea, leg swelling and PND.   Gastrointestinal:  Negative for abdominal pain, blood in stool, constipation, diarrhea, heartburn, melena, nausea and vomiting.   Genitourinary:  Negative for dysuria, frequency and urgency.   Musculoskeletal:  Positive for neck pain (Mild on and off). Negative for back pain and myalgias.   Skin:  Negative for rash.   Neurological:  Negative for dizziness, tremors, seizures, loss of consciousness and weakness.   Endo/Heme/Allergies:  Negative for polydipsia.   Psychiatric/Behavioral:  Negative for depression and hallucinations. The patient does not have insomnia.    Objective:     Physical Exam  Constitutional:       General: She is not in acute distress.     Appearance: She is not diaphoretic.   Neck:      Thyroid: No thyromegaly.   Cardiovascular:      Rate and Rhythm: Normal rate and regular rhythm.      Heart sounds: Normal heart sounds. No murmur heard.  Pulmonary:      Effort: Pulmonary effort is normal. No respiratory distress.      Breath sounds: Normal breath sounds. No wheezing.   Abdominal:      General: Bowel sounds are normal. There is no distension.      Palpations: Abdomen is soft.      Tenderness: There is no abdominal tenderness.   Lymphadenopathy:      Cervical: No cervical adenopathy.   Neurological:      Mental Status: She is alert and oriented to person, place, and time.   Psychiatric:         Behavior: Behavior normal.         Thought Content: Thought content normal.         Judgment: Judgment normal.     Assessment:       ICD-10-CM ICD-9-CM   1. Type 2 diabetes mellitus with diabetic polyneuropathy, with long-term current use of insulin  E11.42 250.60    Z79.4 357.2     V58.67       Plan:     Patient blood pressures are on lower side and so is the heart rate.  Patient labetalol was stopped in the hospital.  Will stop hydralazine as well  Patient blood sugars seem better control but still has some low blood sugars early in the morning  Will  decrease Lantus to 18 units.  Patient has some high blood sugar after dinner, will increase pre meal insulin to 8 units before dinner  Will continue 5 units before lunch and breakfast  Patient has recurrent UTI possibly secondary to hydronephrosis involving the kidney  Patient has appointment with urologist advised patient to keep the appointment.    Medication List with Changes/Refills   Current Medications    ACETAMINOPHEN (TYLENOL) 500 MG TABLET    Take 500 mg by mouth every 6 (six) hours as needed for Pain.    ASPIRIN 81 MG CHEW    CHEW AND SWALLOW 1 TABLET BY MOUTH ONCE DAILY    ATORVASTATIN (LIPITOR) 40 MG TABLET    Take 1 tablet (40 mg total) by mouth once daily.    BLOOD SUGAR DIAGNOSTIC (ONETOUCH ULTRA TEST) [facility]    CHECK BLOOD GLUCOSE 3 TIMES A DAY    BLOOD-GLUCOSE METER KIT    Use as instructed    CEPHALEXIN (KEFLEX) 250 MG CAPSULE    Take 250 mg by mouth.    DICLOFENAC SODIUM (SOLARAZE) 3 % GEL    Apply topically 2 (two) times daily.    DULOXETINE (CYMBALTA) 30 MG CAPSULE    Take 30 mg by mouth once daily.    FERROUS SULFATE 325 (65 FE) MG EC TABLET    Take 1 tablet (325 mg total) by mouth once daily.    FLASH GLUCOSE SCANNING READER (FREESTYLE CRISTOBAL 2 READER) MISC    1 each by Misc.(Non-Drug; Combo Route) route once daily at 6am.    FLASH GLUCOSE SENSOR (FREESTYLE CRISTOBAL 2 SENSOR) KIT    USE 1 SENSOR EVERY 14 DAYS    FUROSEMIDE (LASIX) 40 MG TABLET    Take 1 tablet (40 mg total) by mouth once daily.    GABAPENTIN (NEURONTIN) 300 MG CAPSULE    TAKE 1 CAPSULE BY MOUTH THREE TIMES DAILY    HYDRALAZINE (APRESOLINE) 50 MG TABLET    Take 1 tablet (50 mg total) by mouth every 12 (twelve) hours.    INSULIN (LANTUS SOLOSTAR U-100 INSULIN) GLARGINE 100 UNITS/ML SUBQ PEN    Inject 20 Units into the skin 2 (two) times a day.    ISOSORBIDE MONONITRATE (IMDUR) 30 MG 24 HR TABLET    Take 1 tablet (30 mg total) by mouth once daily.    LANCETS 32 GAUGE MISC    1 lancet by Misc.(Non-Drug; Combo Route) route 3 (three)  "times daily.    LOPERAMIDE (IMODIUM) 2 MG CAPSULE    Take 2 mg by mouth 4 (four) times daily as needed for Diarrhea.    LOSARTAN (COZAAR) 100 MG TABLET    TAKE 1 TABLET BY MOUTH ONCE DAILY FOR 30 DAYS    METOCLOPRAMIDE HCL (REGLAN) 10 MG TABLET    Take 10 mg by mouth 4 (four) times daily before meals and nightly.    METOPROLOL TARTRATE (LOPRESSOR) 50 MG TABLET    Take 50 mg by mouth 2 (two) times daily.    MIRTAZAPINE (REMERON) 15 MG TABLET    Take 15 mg by mouth every evening.    MONTELUKAST (SINGULAIR) 10 MG TABLET    TAKE 1 TABLET BY MOUTH ONCE DAILY FOR 30 DAYS    PANTOPRAZOLE (PROTONIX) 40 MG TABLET    Take 40 mg by mouth once daily.    PEN NEEDLE, DIABETIC (BD ULTRA-FINE SHORT PEN NEEDLE) 31 GAUGE X 5/16" NDLE    1 each by Misc.(Non-Drug; Combo Route) route 5 (five) times daily.    POTASSIUM CHLORIDE SA (K-DUR,KLOR-CON) 20 MEQ TABLET    Take 1 tablet (20 mEq total) by mouth 2 (two) times daily.    TIMOLOL MALEATE 0.5% (TIMOPTIC) 0.5 % DROP    1 drop 2 (two) times daily.   Discontinued Medications    LABETALOL (NORMODYNE) 100 MG TABLET    Take 100 mg by mouth 2 (two) times daily.    NITROFURANTOIN, MACROCRYSTAL-MONOHYDRATE, (MACROBID) 100 MG CAPSULE    Take 1 capsule (100 mg total) by mouth 2 (two) times a day. for 14 days        "

## 2022-12-27 ENCOUNTER — TELEPHONE (OUTPATIENT)
Dept: PRIMARY CARE CLINIC | Facility: CLINIC | Age: 62
End: 2022-12-27

## 2022-12-27 DIAGNOSIS — R92.8 ABNORMAL MAMMOGRAM: Primary | ICD-10-CM

## 2022-12-29 ENCOUNTER — OFFICE VISIT (OUTPATIENT)
Dept: UROLOGY | Facility: CLINIC | Age: 62
End: 2022-12-29
Payer: MEDICAID

## 2022-12-29 ENCOUNTER — HOSPITAL ENCOUNTER (OUTPATIENT)
Dept: RADIOLOGY | Facility: CLINIC | Age: 62
Discharge: HOME | End: 2022-12-29
Attending: NURSE PRACTITIONER
Payer: MEDICAID

## 2022-12-29 VITALS
HEART RATE: 56 BPM | HEIGHT: 59 IN | SYSTOLIC BLOOD PRESSURE: 112 MMHG | DIASTOLIC BLOOD PRESSURE: 53 MMHG | WEIGHT: 134 LBS | BODY MASS INDEX: 27.01 KG/M2

## 2022-12-29 DIAGNOSIS — N30.00 ACUTE CYSTITIS WITHOUT HEMATURIA: ICD-10-CM

## 2022-12-29 DIAGNOSIS — N20.0 RENAL STONE: Primary | ICD-10-CM

## 2022-12-29 DIAGNOSIS — N20.0 RENAL STONE: ICD-10-CM

## 2022-12-29 PROCEDURE — 3060F POS MICROALBUMINURIA REV: CPT | Mod: CPTII,S$GLB,, | Performed by: NURSE PRACTITIONER

## 2022-12-29 PROCEDURE — 99214 PR OFFICE/OUTPT VISIT, EST, LEVL IV, 30-39 MIN: ICD-10-PCS | Mod: S$GLB,,, | Performed by: NURSE PRACTITIONER

## 2022-12-29 PROCEDURE — 3078F PR MOST RECENT DIASTOLIC BLOOD PRESSURE < 80 MM HG: ICD-10-PCS | Mod: CPTII,S$GLB,, | Performed by: NURSE PRACTITIONER

## 2022-12-29 PROCEDURE — 74018 RADEX ABDOMEN 1 VIEW: CPT | Mod: 26,,, | Performed by: STUDENT IN AN ORGANIZED HEALTH CARE EDUCATION/TRAINING PROGRAM

## 2022-12-29 PROCEDURE — 3078F DIAST BP <80 MM HG: CPT | Mod: CPTII,S$GLB,, | Performed by: NURSE PRACTITIONER

## 2022-12-29 PROCEDURE — 3066F NEPHROPATHY DOC TX: CPT | Mod: CPTII,S$GLB,, | Performed by: NURSE PRACTITIONER

## 2022-12-29 PROCEDURE — 3008F BODY MASS INDEX DOCD: CPT | Mod: CPTII,S$GLB,, | Performed by: NURSE PRACTITIONER

## 2022-12-29 PROCEDURE — 74018 RADEX ABDOMEN 1 VIEW: CPT | Mod: TC,,, | Performed by: UROLOGY

## 2022-12-29 PROCEDURE — 1160F RVW MEDS BY RX/DR IN RCRD: CPT | Mod: CPTII,S$GLB,, | Performed by: NURSE PRACTITIONER

## 2022-12-29 PROCEDURE — 74018 XR ABDOMEN AP 1 VIEW: ICD-10-PCS | Mod: 26,,, | Performed by: STUDENT IN AN ORGANIZED HEALTH CARE EDUCATION/TRAINING PROGRAM

## 2022-12-29 PROCEDURE — 3008F PR BODY MASS INDEX (BMI) DOCUMENTED: ICD-10-PCS | Mod: CPTII,S$GLB,, | Performed by: NURSE PRACTITIONER

## 2022-12-29 PROCEDURE — 1160F PR REVIEW ALL MEDS BY PRESCRIBER/CLIN PHARMACIST DOCUMENTED: ICD-10-PCS | Mod: CPTII,S$GLB,, | Performed by: NURSE PRACTITIONER

## 2022-12-29 PROCEDURE — 74018 XR ABDOMEN AP 1 VIEW: ICD-10-PCS | Mod: TC,,, | Performed by: UROLOGY

## 2022-12-29 PROCEDURE — 3051F PR MOST RECENT HEMOGLOBIN A1C LEVEL 7.0 - < 8.0%: ICD-10-PCS | Mod: CPTII,S$GLB,, | Performed by: NURSE PRACTITIONER

## 2022-12-29 PROCEDURE — 3051F HG A1C>EQUAL 7.0%<8.0%: CPT | Mod: CPTII,S$GLB,, | Performed by: NURSE PRACTITIONER

## 2022-12-29 PROCEDURE — 3060F PR POS MICROALBUMINURIA RESULT DOCUMENTED/REVIEW: ICD-10-PCS | Mod: CPTII,S$GLB,, | Performed by: NURSE PRACTITIONER

## 2022-12-29 PROCEDURE — 3074F PR MOST RECENT SYSTOLIC BLOOD PRESSURE < 130 MM HG: ICD-10-PCS | Mod: CPTII,S$GLB,, | Performed by: NURSE PRACTITIONER

## 2022-12-29 PROCEDURE — 99214 OFFICE O/P EST MOD 30 MIN: CPT | Mod: S$GLB,,, | Performed by: NURSE PRACTITIONER

## 2022-12-29 PROCEDURE — 1159F PR MEDICATION LIST DOCUMENTED IN MEDICAL RECORD: ICD-10-PCS | Mod: CPTII,S$GLB,, | Performed by: NURSE PRACTITIONER

## 2022-12-29 PROCEDURE — 4010F PR ACE/ARB THEARPY RXD/TAKEN: ICD-10-PCS | Mod: CPTII,S$GLB,, | Performed by: NURSE PRACTITIONER

## 2022-12-29 PROCEDURE — 3074F SYST BP LT 130 MM HG: CPT | Mod: CPTII,S$GLB,, | Performed by: NURSE PRACTITIONER

## 2022-12-29 PROCEDURE — 3066F PR DOCUMENTATION OF TREATMENT FOR NEPHROPATHY: ICD-10-PCS | Mod: CPTII,S$GLB,, | Performed by: NURSE PRACTITIONER

## 2022-12-29 PROCEDURE — 1159F MED LIST DOCD IN RCRD: CPT | Mod: CPTII,S$GLB,, | Performed by: NURSE PRACTITIONER

## 2022-12-29 PROCEDURE — 4010F ACE/ARB THERAPY RXD/TAKEN: CPT | Mod: CPTII,S$GLB,, | Performed by: NURSE PRACTITIONER

## 2022-12-29 RX ORDER — ESTRADIOL 0.1 MG/G
CREAM VAGINAL
COMMUNITY
Start: 2022-12-14 | End: 2023-03-13 | Stop reason: SDUPTHER

## 2022-12-29 RX ORDER — ONDANSETRON 4 MG/1
8 TABLET, FILM COATED ORAL 2 TIMES DAILY
COMMUNITY
End: 2023-01-11

## 2022-12-29 RX ORDER — DOCUSATE SODIUM 100 MG/1
100 CAPSULE, LIQUID FILLED ORAL 2 TIMES DAILY
COMMUNITY
End: 2023-04-17 | Stop reason: ALTCHOICE

## 2022-12-29 RX ORDER — INSULIN LISPRO 100 [IU]/ML
INJECTION, SOLUTION INTRAVENOUS; SUBCUTANEOUS
COMMUNITY
End: 2023-02-28

## 2022-12-29 RX ORDER — CEPHALEXIN 250 MG/1
250 CAPSULE ORAL DAILY
Qty: 30 CAPSULE | Refills: 2 | Status: SHIPPED | OUTPATIENT
Start: 2022-12-29 | End: 2023-03-20 | Stop reason: SDUPTHER

## 2022-12-29 NOTE — PROGRESS NOTES
Subjective:       Patient ID: Radha Hoffman is a 62 y.o. female.    Chief Complaint: Other (Hosp fu, septic, dehydration, bp drop, and kidney stone)      HPI: 62-year-old female known service returns today history recurring UTIs she was last in the hospital November 29, 2022 with sepsis.  I have no paperwork to ascertain if this was actually secondary to a UTI but according to the family this was mentioned to them they also found left-sided hydronephrosis hydroureter with secondary to possible passage of stone versus infection.  She did show some calcified stones in the right renal collecting system.  Per daughter she is had 5 or 6 UTIs since June of this year.  Currently today she feels well.       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 Abdalla    Stomach ulcer     Urinary retention     Urinary tract infection        Past Surgical Historical:   Past Surgical History:   Procedure Laterality Date    CATARACT EXTRACTION, BILATERAL      Catherization of heart      endo scopy      HYSTERECTOMY          Medications:   Medication List with Changes/Refills   Current Medications    ACETAMINOPHEN (TYLENOL) 500 MG TABLET    Take 500 mg by mouth every 6 (six) hours as needed for Pain.    ASPIRIN 81 MG CHEW    CHEW AND SWALLOW 1 TABLET BY MOUTH ONCE DAILY    ATORVASTATIN (LIPITOR) 40 MG TABLET    Take 1 tablet (40 mg total) by mouth once daily.    BLOOD SUGAR DIAGNOSTIC (ONETOUCH ULTRA TEST) [facility]    CHECK BLOOD GLUCOSE 3 TIMES A DAY    BLOOD-GLUCOSE METER KIT    Use as instructed    CEPHALEXIN (KEFLEX)  250 MG CAPSULE    Take 250 mg by mouth.    DICLOFENAC SODIUM (SOLARAZE) 3 % GEL    Apply topically 2 (two) times daily.    DOCUSATE SODIUM (COLACE) 100 MG CAPSULE    Take 100 mg by mouth 2 (two) times daily.    DULOXETINE (CYMBALTA) 30 MG CAPSULE    Take 30 mg by mouth once daily.    ESTRADIOL (ESTRACE) 0.01 % (0.1 MG/GRAM) VAGINAL CREAM    PLACE 1 GRAM VAGINALLY EVERY MONDAY, WEDNESDAY, FRIDAY    FERROUS SULFATE 325 (65 FE) MG EC TABLET    Take 1 tablet (325 mg total) by mouth once daily.    FLASH GLUCOSE SCANNING READER (FREESTYLE CRISTOBAL 2 READER) MISC    1 each by Misc.(Non-Drug; Combo Route) route once daily at 6am.    FLASH GLUCOSE SENSOR (FREESTYLE CRISTOBAL 2 SENSOR) KIT    USE 1 SENSOR EVERY 14 DAYS    FUROSEMIDE (LASIX) 40 MG TABLET    Take 1 tablet (40 mg total) by mouth once daily.    GABAPENTIN (NEURONTIN) 300 MG CAPSULE    TAKE 1 CAPSULE BY MOUTH THREE TIMES DAILY    INSULIN (LANTUS SOLOSTAR U-100 INSULIN) GLARGINE 100 UNITS/ML SUBQ PEN    Inject 20 Units into the skin 2 (two) times a day.    INSULIN LISPRO 100 UNIT/ML INJECTION    Inject into the skin 3 (three) times daily before meals.    ISOSORBIDE MONONITRATE (IMDUR) 30 MG 24 HR TABLET    Take 1 tablet (30 mg total) by mouth once daily.    LANCETS 32 GAUGE MISC    1 lancet by Misc.(Non-Drug; Combo Route) route 3 (three) times daily.    LOPERAMIDE (IMODIUM) 2 MG CAPSULE    Take 2 mg by mouth 4 (four) times daily as needed for Diarrhea.    LOSARTAN (COZAAR) 100 MG TABLET    TAKE 1 TABLET BY MOUTH ONCE DAILY FOR 30 DAYS    METOCLOPRAMIDE HCL (REGLAN) 10 MG TABLET    Take 10 mg by mouth 4 (four) times daily before meals and nightly.    METOPROLOL TARTRATE (LOPRESSOR) 50 MG TABLET    Take 50 mg by mouth 2 (two) times daily.    MIRTAZAPINE (REMERON) 15 MG TABLET    Take 15 mg by mouth every evening.    MONTELUKAST (SINGULAIR) 10 MG TABLET    TAKE 1 TABLET BY MOUTH ONCE DAILY FOR 30 DAYS    ONDANSETRON (ZOFRAN) 4 MG TABLET    Take 8 mg by mouth 2 (two) times  "daily.    PANTOPRAZOLE (PROTONIX) 40 MG TABLET    Take 40 mg by mouth once daily.    PEN NEEDLE, DIABETIC (BD ULTRA-FINE SHORT PEN NEEDLE) 31 GAUGE X 5/16" NDLE    1 each by Misc.(Non-Drug; Combo Route) route 5 (five) times daily.    POTASSIUM CHLORIDE SA (K-DUR,KLOR-CON) 20 MEQ TABLET    Take 1 tablet (20 mEq total) by mouth 2 (two) times daily.    TIMOLOL MALEATE 0.5% (TIMOPTIC) 0.5 % DROP    1 drop 2 (two) times daily.    TURMERIC ORAL    Take by mouth.        Past Social History:   Social History     Socioeconomic History    Marital status: Single    Number of children: 3   Occupational History    Occupation: nurses aide   Tobacco Use    Smoking status: Never    Smokeless tobacco: Never   Substance and Sexual Activity    Alcohol use: No    Drug use: No    Sexual activity: Never       Allergies:   Review of patient's allergies indicates:   Allergen Reactions    Metformin     Phenazopyridine     Sulfa (sulfonamide antibiotics)         Family History:   Family History   Problem Relation Age of Onset    Arthritis Mother     Depression Mother     Diabetes Mother     Heart disease Mother     Hypertension Mother     Arthritis Father     Depression Father     Arthritis Sister     Diabetes Sister     Arthritis Brother         Review of Systems:  Review of Systems   Constitutional:  Negative for activity change and appetite change.   HENT:  Negative for congestion and dental problem.    Respiratory:  Negative for chest tightness and shortness of breath.    Cardiovascular:  Negative for chest pain.   Gastrointestinal:  Negative for abdominal distention and abdominal pain.   Genitourinary:  Negative for decreased urine volume, difficulty urinating, dyspareunia, dysuria, enuresis, flank pain, frequency, genital sores, hematuria, pelvic pain and urgency.   Musculoskeletal:  Negative for back pain and neck pain.   Allergic/Immunologic: Negative for immunocompromised state.   Neurological:  Negative for dizziness. "   Hematological:  Negative for adenopathy.   Psychiatric/Behavioral:  Negative for agitation, behavioral problems and confusion.      Physical Exam:  Physical Exam  Constitutional:       Appearance: She is well-developed.   HENT:      Head: Normocephalic and atraumatic.   Eyes:      General: No scleral icterus.  Pulmonary:      Effort: Pulmonary effort is normal.      Breath sounds: Normal breath sounds.   Abdominal:      General: There is no distension.      Palpations: Abdomen is soft.      Tenderness: There is no abdominal tenderness.   Genitourinary:     Exam position: Supine.      Labia:         Right: No rash, tenderness or lesion.         Left: No rash, tenderness or lesion.       Vagina: Normal.      Rectum: Normal.   Musculoskeletal:      Cervical back: Normal range of motion.   Skin:     General: Skin is warm and dry.   Neurological:      Mental Status: She is alert and oriented to person, place, and time.       Assessment/Plan:   Recurring UTI by history--patient feels well today is asymptomatic.  Voided prior to arrival.  Bladder scan--0 mL.  Patient is on Keflex 250 mg daily low-dose for maintenance antibiotic.  I will refill that keep her on that we see her back after her constipation has resolved.  For can see the stone that was picked up on CT imaging of the right intrarenal may consider lithotripsy to rule that out as a cause of the recurrent UTIs.    Right intrarenal kidney stone--KUB today--no obvious stone is seen in the upper tracts although the patient's upper silhouettes are obscured by large volume of stool and bowel gas.  Patient does report constipation.    Constipation--over-the-counter MiraLax 17 g daily for now at Colace 150 mg in the a.m. and p.m. until stools are soft and easy to move then discontinue Colace but continue the MiraLax daily.  Problem List Items Addressed This Visit    None

## 2023-01-03 DIAGNOSIS — R92.8 ABNORMAL MAMMOGRAM: Primary | ICD-10-CM

## 2023-01-03 LAB — BCS RECOMMENDATION EXT: NORMAL

## 2023-01-11 ENCOUNTER — OFFICE VISIT (OUTPATIENT)
Dept: SURGERY | Facility: CLINIC | Age: 63
End: 2023-01-11
Payer: MEDICARE

## 2023-01-11 DIAGNOSIS — R92.8 ABNORMAL MAMMOGRAM: ICD-10-CM

## 2023-01-11 DIAGNOSIS — N63.21 MASS OF UPPER OUTER QUADRANT OF LEFT BREAST: Primary | ICD-10-CM

## 2023-01-11 PROCEDURE — 99213 OFFICE O/P EST LOW 20 MIN: CPT | Mod: S$GLB,,, | Performed by: SURGERY

## 2023-01-11 PROCEDURE — 99213 PR OFFICE/OUTPT VISIT, EST, LEVL III, 20-29 MIN: ICD-10-PCS | Mod: S$GLB,,, | Performed by: SURGERY

## 2023-01-11 NOTE — PROGRESS NOTES
History & Physical    SUBJECTIVE:     History of Present Illness:    62-year-old female accompanied by her family is here today for an abnormal left breast ultrasound.  She is referred by Dr. Mathew.  Patient had mammogram and ultrasound 6 months ago that showed a 4 mm benign-appearing mass in the left breast 3 o'clock position.  Follow-up ultrasound done recently shows a 6 mm hypoechoic mass with angular margins.  The lesion is wider than tall.  It was interpreted as a suspicious abnormality BI-RADS for an ultrasound-guided left breast biopsy was recommended.  Patient takes no hormonal replacement therapy.  No family history of breast cancer.  Patient is postmenopausal.    Chief Complaint   Patient presents with    Lump    Breast Problem     Left breast         Review of patient's allergies indicates:  Review of patient's allergies indicates:   Allergen Reactions    Metformin     Phenazopyridine     Sulfa (sulfonamide antibiotics)        Current Outpatient Medications on File Prior to Visit   Medication Sig Dispense Refill    acetaminophen (TYLENOL) 500 MG tablet Take 500 mg by mouth every 6 (six) hours as needed for Pain.      aspirin 81 MG Chew CHEW AND SWALLOW 1 TABLET BY MOUTH ONCE DAILY      atorvastatin (LIPITOR) 40 MG tablet Take 1 tablet (40 mg total) by mouth once daily. 90 tablet 3    blood sugar diagnostic (ONETOUCH ULTRA TEST) [facility] CHECK BLOOD GLUCOSE 3 TIMES A DAY 50 each 0    blood-glucose meter kit Use as instructed 1 each 0    cephALEXin (KEFLEX) 250 MG capsule Take 1 capsule (250 mg total) by mouth Daily. 30 capsule 2    diclofenac sodium (SOLARAZE) 3 % gel Apply topically 2 (two) times daily.      docusate sodium (COLACE) 100 MG capsule Take 100 mg by mouth 2 (two) times daily.      DULoxetine (CYMBALTA) 30 MG capsule Take 30 mg by mouth once daily.      estradioL (ESTRACE) 0.01 % (0.1 mg/gram) vaginal cream PLACE 1 GRAM VAGINALLY EVERY MONDAY, WEDNESDAY, FRIDAY      ferrous sulfate 325 (65 FE) MG  "EC tablet Take 1 tablet (325 mg total) by mouth once daily. 90 tablet 1    flash glucose scanning reader (FREESTYLE CRISTOBAL 2 READER) Misc 1 each by Misc.(Non-Drug; Combo Route) route once daily at 6am. 1 each 0    flash glucose sensor (FREESTYLE CRISTOBAL 2 SENSOR) Kit USE 1 SENSOR EVERY 14 DAYS 6 kit 3    furosemide (LASIX) 40 MG tablet Take 1 tablet (40 mg total) by mouth once daily. 30 tablet 11    gabapentin (NEURONTIN) 300 MG capsule TAKE 1 CAPSULE BY MOUTH THREE TIMES DAILY 90 capsule 0    HUMALOG KWIKPEN INSULIN 100 unit/mL pen 5 units before each meal. 15 mL 0    insulin (LANTUS SOLOSTAR U-100 INSULIN) glargine 100 units/mL SubQ pen Inject 20 Units into the skin 2 (two) times a day. (Patient taking differently: Inject 10 Units into the skin 2 (two) times a day.) 36 mL 3    insulin lispro 100 unit/mL injection Inject into the skin 3 (three) times daily before meals.      isosorbide mononitrate (IMDUR) 30 MG 24 hr tablet Take 1 tablet (30 mg total) by mouth once daily. 30 tablet 11    lancets 32 gauge Misc 1 lancet by Misc.(Non-Drug; Combo Route) route 3 (three) times daily. 100 each 3    loperamide (IMODIUM) 2 mg capsule Take 2 mg by mouth 4 (four) times daily as needed for Diarrhea.      losartan (COZAAR) 100 MG tablet TAKE 1 TABLET BY MOUTH ONCE DAILY FOR 30 DAYS 90 tablet 3    mirtazapine (REMERON) 15 MG tablet Take 15 mg by mouth every evening.      montelukast (SINGULAIR) 10 mg tablet TAKE 1 TABLET BY MOUTH ONCE DAILY FOR 30 DAYS 90 tablet 3    pen needle, diabetic (BD ULTRA-FINE SHORT PEN NEEDLE) 31 gauge x 5/16" Ndle 1 each by Misc.(Non-Drug; Combo Route) route 5 (five) times daily. 500 each 3    potassium chloride SA (K-DUR,KLOR-CON) 20 MEQ tablet Take 1 tablet (20 mEq total) by mouth 2 (two) times daily. 60 tablet 2    TURMERIC ORAL Take by mouth.      [DISCONTINUED] metoclopramide HCl (REGLAN) 10 MG tablet Take 10 mg by mouth 4 (four) times daily before meals and nightly.      [DISCONTINUED] metoprolol " tartrate (LOPRESSOR) 50 MG tablet Take 50 mg by mouth 2 (two) times daily.      [DISCONTINUED] ondansetron (ZOFRAN) 4 MG tablet Take 8 mg by mouth 2 (two) times daily.      [DISCONTINUED] pantoprazole (PROTONIX) 40 MG tablet Take 40 mg by mouth once daily.      [DISCONTINUED] timolol maleate 0.5% (TIMOPTIC) 0.5 % Drop 1 drop 2 (two) times daily.       No current facility-administered medications on file prior to visit.       Past Medical History:   Diagnosis Date    Allergy     Arthritis     Coronary artery disease     Depression     Diabetes mellitus, type 2     Esophageal hernia     GERD (gastroesophageal reflux disease)     Glaucoma     Hypercholesterolemia     Hypertension     Implantation cyst of iris of both eyes     approx 3-4 years ago Dr Hi     Recurrent major depressive disorder, in full remission 04/30/2021    Patient being followed by Dr. Abdalla    Stomach ulcer     Urinary retention     Urinary tract infection      Past Surgical History:   Procedure Laterality Date    CATARACT EXTRACTION, BILATERAL      Catherization of heart      endo scopy      HYSTERECTOMY       Family History   Problem Relation Age of Onset    Arthritis Mother     Depression Mother     Diabetes Mother     Heart disease Mother     Hypertension Mother     Arthritis Father     Depression Father     Arthritis Sister     Diabetes Sister     Arthritis Brother        Social History     Socioeconomic History    Marital status: Single    Number of children: 3   Occupational History    Occupation: nurses aide   Tobacco Use    Smoking status: Never    Smokeless tobacco: Never   Substance and Sexual Activity    Alcohol use: No    Drug use: No    Sexual activity: Never          Review of Systems   Constitutional: Negative.    Cardiovascular: Negative.    Genitourinary: Negative.    Musculoskeletal:  Positive for joint pain.   Neurological:  Positive for weakness.   Endo/Heme/Allergies: Negative.    Psychiatric/Behavioral: Negative.        OBJECTIVE:     There were no vitals filed for this visit.              Physical Exam:  Physical Exam  Constitutional:       Appearance: Normal appearance.   Cardiovascular:      Rate and Rhythm: Normal rate.   Pulmonary:      Effort: Pulmonary effort is normal.   Chest:      Comments: Bilateral breast examination performed.  Left breast:  No palpable abnormalities in left breast noted.  Skin of left breast normal.  Nipple areola complex normal.  Axilla negative.  Right breast:  No palpable abnormal masses noted in right breast.  Nipple areola complex normal.  Skin of right breast normal.  Axilla negative.  Abdominal:      General: Abdomen is flat.      Palpations: Abdomen is soft.   Skin:     General: Skin is dry.   Neurological:      General: No focal deficit present.      Mental Status: She is alert and oriented to person, place, and time.           ASSESSMENT/PLAN:   6 mm left breast mass 3:00 a.m.  PLAN:  Recommend ultrasound-guided left breast biopsy.  Further treatment pending pathological diagnosis.  All questions answered today

## 2023-01-13 ENCOUNTER — PATIENT OUTREACH (OUTPATIENT)
Dept: ADMINISTRATIVE | Facility: HOSPITAL | Age: 63
End: 2023-01-13
Payer: MEDICARE

## 2023-01-27 LAB — SPECIMEN TO PATHOLOGY: NORMAL

## 2023-01-30 ENCOUNTER — TELEPHONE (OUTPATIENT)
Dept: SURGERY | Facility: CLINIC | Age: 63
End: 2023-01-30
Payer: MEDICARE

## 2023-01-30 NOTE — TELEPHONE ENCOUNTER
Patient notified of recent left breast biopsy results done in Radiology showing this to be fibrocystic changes as well as fibroadenoma it was changes.  This is benign and no further surgical intervention is necessary.

## 2023-02-02 ENCOUNTER — OFFICE VISIT (OUTPATIENT)
Dept: UROLOGY | Facility: CLINIC | Age: 63
End: 2023-02-02
Payer: MEDICARE

## 2023-02-02 VITALS — HEART RATE: 53 BPM | SYSTOLIC BLOOD PRESSURE: 161 MMHG | DIASTOLIC BLOOD PRESSURE: 73 MMHG

## 2023-02-02 DIAGNOSIS — N30.00 ACUTE CYSTITIS WITHOUT HEMATURIA: Primary | ICD-10-CM

## 2023-02-02 PROCEDURE — 99214 OFFICE O/P EST MOD 30 MIN: CPT | Mod: S$GLB,,, | Performed by: NURSE PRACTITIONER

## 2023-02-02 PROCEDURE — 99214 PR OFFICE/OUTPT VISIT, EST, LEVL IV, 30-39 MIN: ICD-10-PCS | Mod: S$GLB,,, | Performed by: NURSE PRACTITIONER

## 2023-02-02 NOTE — PROGRESS NOTES
Subjective:       Patient ID: Radha Hoffman is a 63 y.o. female.    Chief Complaint: Follow-up (6 weeks follow up for possible kidney stone)      HPI: 63-year-old female known to service history recurring UTI.  She was treated for most recent infection and is now on maintenance antibiotic.  She remains asymptomatic.  Also has a history kidney stones with no recurrence symptoms.  She is voiding without complaint.  She feels well looks well states she feels much better.  She ambulates with a walker but states she is actually able to ambulate without the walker around the home setting.  No other urologic concerns.       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 Abdalla    Stomach ulcer     Urinary retention     Urinary tract infection        Past Surgical Historical:   Past Surgical History:   Procedure Laterality Date    CATARACT EXTRACTION, BILATERAL      Catherization of heart      endo scopy      HYSTERECTOMY          Medications:   Medication List with Changes/Refills   Current Medications    ACETAMINOPHEN (TYLENOL) 500 MG TABLET    Take 500 mg by mouth every 6 (six) hours as needed for Pain.    ASPIRIN 81 MG CHEW    CHEW AND SWALLOW 1 TABLET BY MOUTH ONCE DAILY    ATORVASTATIN (LIPITOR) 40 MG TABLET    Take 1 tablet (40 mg total) by mouth once daily.    BLOOD SUGAR DIAGNOSTIC (ONETOUCH ULTRA TEST) [facility]    CHECK BLOOD GLUCOSE 3 TIMES A DAY    BLOOD-GLUCOSE METER KIT    Use as instructed    CEPHALEXIN (KEFLEX) 250 MG CAPSULE    Take 1 capsule (250 mg total) by mouth Daily.    DICLOFENAC  "SODIUM (SOLARAZE) 3 % GEL    Apply topically 2 (two) times daily.    DOCUSATE SODIUM (COLACE) 100 MG CAPSULE    Take 100 mg by mouth 2 (two) times daily.    DULOXETINE (CYMBALTA) 30 MG CAPSULE    Take 30 mg by mouth once daily.    ESTRADIOL (ESTRACE) 0.01 % (0.1 MG/GRAM) VAGINAL CREAM    PLACE 1 GRAM VAGINALLY EVERY MONDAY, WEDNESDAY, FRIDAY    FERROUS SULFATE 325 (65 FE) MG EC TABLET    Take 1 tablet (325 mg total) by mouth once daily.    FLASH GLUCOSE SCANNING READER (FREESTYLE CRISTOBAL 2 READER) MISC    1 each by Misc.(Non-Drug; Combo Route) route once daily at 6am.    FLASH GLUCOSE SENSOR (FREESTYLE CRISTOBAL 2 SENSOR) KIT    USE 1 SENSOR EVERY 14 DAYS    FUROSEMIDE (LASIX) 40 MG TABLET    Take 1 tablet (40 mg total) by mouth once daily.    GABAPENTIN (NEURONTIN) 300 MG CAPSULE    TAKE 1 CAPSULE BY MOUTH THREE TIMES DAILY    HUMALOG KWIKPEN INSULIN 100 UNIT/ML PEN    5 units before each meal.    INSULIN (LANTUS SOLOSTAR U-100 INSULIN) GLARGINE 100 UNITS/ML SUBQ PEN    Inject 20 Units into the skin 2 (two) times a day.    INSULIN LISPRO 100 UNIT/ML INJECTION    Inject into the skin 3 (three) times daily before meals.    ISOSORBIDE MONONITRATE (IMDUR) 30 MG 24 HR TABLET    Take 1 tablet (30 mg total) by mouth once daily.    LACTOBACILLUS RHAMNOSUS-FIBER ORAL    Take 1 capsule by mouth Daily.    LANCETS 32 GAUGE MISC    1 lancet by Misc.(Non-Drug; Combo Route) route 3 (three) times daily.    LOPERAMIDE (IMODIUM) 2 MG CAPSULE    Take 2 mg by mouth 4 (four) times daily as needed for Diarrhea.    LOSARTAN (COZAAR) 100 MG TABLET    TAKE 1 TABLET BY MOUTH ONCE DAILY FOR 30 DAYS    MIRTAZAPINE (REMERON) 15 MG TABLET    Take 15 mg by mouth every evening.    MONTELUKAST (SINGULAIR) 10 MG TABLET    TAKE 1 TABLET BY MOUTH ONCE DAILY FOR 30 DAYS    PEN NEEDLE, DIABETIC (BD ULTRA-FINE SHORT PEN NEEDLE) 31 GAUGE X 5/16" NDLE    1 each by Misc.(Non-Drug; Combo Route) route 5 (five) times daily.    POTASSIUM CHLORIDE SA (K-DUR,KLOR-CON) " 20 MEQ TABLET    Take 1 tablet by mouth twice daily    TURMERIC ORAL    Take by mouth.        Past Social History:   Social History     Socioeconomic History    Marital status: Single    Number of children: 3   Occupational History    Occupation: nurses aide   Tobacco Use    Smoking status: Never    Smokeless tobacco: Never   Substance and Sexual Activity    Alcohol use: No    Drug use: No    Sexual activity: Never       Allergies:   Review of patient's allergies indicates:   Allergen Reactions    Metformin     Phenazopyridine     Sulfa (sulfonamide antibiotics)         Family History:   Family History   Problem Relation Age of Onset    Arthritis Mother     Depression Mother     Diabetes Mother     Heart disease Mother     Hypertension Mother     Arthritis Father     Depression Father     Arthritis Sister     Diabetes Sister     Arthritis Brother         Review of Systems:  Review of Systems   Constitutional:  Negative for activity change and appetite change.   HENT:  Negative for congestion and dental problem.    Respiratory:  Negative for chest tightness and shortness of breath.    Cardiovascular:  Negative for chest pain.   Gastrointestinal:  Negative for abdominal distention.   Genitourinary:  Negative for decreased urine volume, difficulty urinating, dyspareunia, dysuria, enuresis, flank pain, frequency, genital sores, hematuria, pelvic pain and urgency.   Musculoskeletal:  Negative for back pain and neck pain.   Allergic/Immunologic: Negative for immunocompromised state.   Neurological:  Negative for dizziness.   Hematological:  Negative for adenopathy.   Psychiatric/Behavioral:  Negative for agitation, behavioral problems and confusion.      Physical Exam:  Physical Exam  Constitutional:       Appearance: She is well-developed.   HENT:      Head: Normocephalic and atraumatic.   Eyes:      General: No scleral icterus.  Pulmonary:      Effort: Pulmonary effort is normal.      Breath sounds: Normal breath  sounds.   Abdominal:      General: There is no distension.      Palpations: Abdomen is soft.      Tenderness: There is no abdominal tenderness.   Genitourinary:     Exam position: Supine.      Labia:         Right: No rash, tenderness or lesion.         Left: No rash, tenderness or lesion.       Vagina: Normal.      Rectum: Normal.   Musculoskeletal:      Cervical back: Normal range of motion.   Skin:     General: Skin is warm and dry.   Neurological:      Mental Status: She is alert and oriented to person, place, and time.       Assessment/Plan:   Recurring UTI--patient unable to provide specimen arrival she voided prior to leaving her home for her visit here today.  She is asymptomatic states she feels well.  Will continue the maintenance antibiotic and see her back in 3 months for re-evaluation.  If she remains UTI free during this time.  Will wean her off the antibiotic and see how she does forward.  Otherwise will see her on an as-needed basis before next scheduled visit    Kidney stones--by history--no symptoms.  Monitor for stone burden in the future.  Problem List Items Addressed This Visit    None

## 2023-02-27 ENCOUNTER — PATIENT OUTREACH (OUTPATIENT)
Dept: ADMINISTRATIVE | Facility: HOSPITAL | Age: 63
End: 2023-02-27
Payer: MEDICARE

## 2023-02-28 ENCOUNTER — OFFICE VISIT (OUTPATIENT)
Dept: PRIMARY CARE CLINIC | Facility: CLINIC | Age: 63
End: 2023-02-28
Payer: MEDICARE

## 2023-02-28 VITALS
BODY MASS INDEX: 28.02 KG/M2 | RESPIRATION RATE: 16 BRPM | WEIGHT: 139 LBS | TEMPERATURE: 98 F | HEIGHT: 59 IN | SYSTOLIC BLOOD PRESSURE: 128 MMHG | DIASTOLIC BLOOD PRESSURE: 65 MMHG | HEART RATE: 56 BPM | OXYGEN SATURATION: 99 %

## 2023-02-28 DIAGNOSIS — D64.9 ANEMIA, UNSPECIFIED TYPE: ICD-10-CM

## 2023-02-28 DIAGNOSIS — K31.84 GASTROPARESIS: ICD-10-CM

## 2023-02-28 DIAGNOSIS — Z78.0 POST-MENOPAUSAL: ICD-10-CM

## 2023-02-28 DIAGNOSIS — F33.42 RECURRENT MAJOR DEPRESSIVE DISORDER, IN FULL REMISSION: ICD-10-CM

## 2023-02-28 DIAGNOSIS — I25.118 CORONARY ARTERY DISEASE OF NATIVE ARTERY OF NATIVE HEART WITH STABLE ANGINA PECTORIS: ICD-10-CM

## 2023-02-28 DIAGNOSIS — K59.00 CONSTIPATION, UNSPECIFIED CONSTIPATION TYPE: ICD-10-CM

## 2023-02-28 DIAGNOSIS — E11.42 TYPE 2 DIABETES MELLITUS WITH DIABETIC POLYNEUROPATHY, WITHOUT LONG-TERM CURRENT USE OF INSULIN: Primary | ICD-10-CM

## 2023-02-28 PROBLEM — N28.89 RENAL MASS: Status: RESOLVED | Noted: 2022-07-27 | Resolved: 2023-02-28

## 2023-02-28 PROBLEM — I16.1 HYPERTENSIVE EMERGENCY: Status: RESOLVED | Noted: 2022-07-27 | Resolved: 2023-02-28

## 2023-02-28 PROCEDURE — 95251 PR GLUCOSE MONITOR, 72 HOUR, PHYS INTERP: ICD-10-PCS | Mod: S$GLB,,, | Performed by: INTERNAL MEDICINE

## 2023-02-28 PROCEDURE — 99214 OFFICE O/P EST MOD 30 MIN: CPT | Mod: 25,S$GLB,, | Performed by: INTERNAL MEDICINE

## 2023-02-28 PROCEDURE — 95251 CONT GLUC MNTR ANALYSIS I&R: CPT | Mod: S$GLB,,, | Performed by: INTERNAL MEDICINE

## 2023-02-28 PROCEDURE — 99214 PR OFFICE/OUTPT VISIT, EST, LEVL IV, 30-39 MIN: ICD-10-PCS | Mod: 25,S$GLB,, | Performed by: INTERNAL MEDICINE

## 2023-02-28 RX ORDER — METOPROLOL TARTRATE 50 MG/1
50 TABLET ORAL 2 TIMES DAILY
COMMUNITY
Start: 2023-02-01 | End: 2023-09-06

## 2023-02-28 RX ORDER — METOCLOPRAMIDE 10 MG/1
10 TABLET ORAL
Qty: 90 TABLET | Refills: 1 | Status: SHIPPED | OUTPATIENT
Start: 2023-02-28 | End: 2023-04-17 | Stop reason: ALTCHOICE

## 2023-02-28 RX ORDER — OMEPRAZOLE 40 MG/1
40 CAPSULE, DELAYED RELEASE ORAL DAILY
COMMUNITY
End: 2023-08-01 | Stop reason: SDUPTHER

## 2023-02-28 NOTE — PROGRESS NOTES
Subjective:      Patient ID: Radha Hoffman is a 63 y.o. female.    Chief Complaint: Diabetes (1 month f/u ), Abdominal Pain (Upper abd pain, associated heartburn), Constipation (C/o constipation), and Low-back Pain (C/o low back pain)    With diabetes with last A1c of 6.2 improved from 7.3.  Patient reports better adherence with medication and diet.  Patient has CGM placed and that suggest average blood sugar is running 127 with blood sugars running in range 90% of the time running high 7% of the time and very high 1% of the time there are a few low blood sugars roughly about 1%.  Patient reports taking 20 units of Lantus and Humalog 8 units before each meal.  Hypoglycemic episodes are noted after meals.  Patient denies polyuria, polydipsia, numbness in hands or feet     Angiogram was done by Dr. Barrios, that showed moderate 3 vessel coronary artery disease.  No stents were placed and medical management was recommended.  Patient is on aspirin, beta blocker, ARB, statin.  Patient also complains of bilateral ankle swelling x long. Patient blood pressure seem under good control + last LDL of 44 is at goal    Patient complains of epigastric area pain for long.  Pain is constant, worsened with food, has feeling of bloating and fullness.  Patient has gastroparesis for which she was taking Reglan with much help with the medication was stopped secondary to interaction with other medications.  Patient also complains of constipation, patient has a bowel movement every other day and has to strain a lot to have a bowel movement.     Patient also complains of low back pain for long. Pain is intermittent, patient has moderate to severe in intensity, 7/10, no radiation of pain, no exacerbating or relieving factors known.  Patient takes Tylenol with little help.    Review of Systems   Constitutional:  Negative for chills, diaphoresis, fever, malaise/fatigue and weight loss (5lbs weight gain).   HENT:  Negative for  congestion, ear pain, sinus pain, sore throat and tinnitus.    Eyes:  Negative for blurred vision and photophobia.   Respiratory:  Negative for cough, hemoptysis, shortness of breath and wheezing.    Cardiovascular:  Negative for chest pain, palpitations, orthopnea, leg swelling and PND.   Gastrointestinal:  Positive for abdominal pain and constipation. Negative for blood in stool, diarrhea, heartburn, melena, nausea and vomiting.   Genitourinary:  Negative for dysuria, frequency and urgency.   Musculoskeletal:  Negative for back pain, myalgias and neck pain.   Skin:  Negative for rash.   Neurological:  Negative for dizziness, tremors, seizures, loss of consciousness and weakness.   Endo/Heme/Allergies:  Negative for polydipsia.   Psychiatric/Behavioral:  Negative for depression and hallucinations. The patient does not have insomnia.    Objective:     Physical Exam  Constitutional:       General: She is not in acute distress.     Appearance: She is not diaphoretic.   Neck:      Thyroid: No thyromegaly.   Cardiovascular:      Rate and Rhythm: Normal rate and regular rhythm.      Heart sounds: Normal heart sounds. No murmur heard.  Pulmonary:      Effort: Pulmonary effort is normal. No respiratory distress.      Breath sounds: Normal breath sounds. No wheezing.   Abdominal:      General: Bowel sounds are normal. There is no distension.      Palpations: Abdomen is soft.      Tenderness: There is abdominal tenderness (epigastric).   Musculoskeletal:      Comments: Diffuse lumbar area tenderness   Lymphadenopathy:      Cervical: No cervical adenopathy.   Neurological:      Mental Status: She is alert and oriented to person, place, and time.   Psychiatric:         Behavior: Behavior normal.         Thought Content: Thought content normal.         Judgment: Judgment normal.     Assessment:       ICD-10-CM ICD-9-CM   1. Type 2 diabetes mellitus with diabetic polyneuropathy, without long-term current use of insulin  E11.42  250.60     357.2   2. Coronary artery disease of native artery of native heart with stable angina pectoris  I25.118 414.01     413.9   3. Recurrent major depressive disorder, in full remission  F33.42 296.36   4. Post-menopausal  Z78.0 V49.81   5. Gastroparesis  K31.84 536.3   6. Constipation, unspecified constipation type  K59.00 564.00       Plan:     Patient last A1c of 6.2 is at goal.  Will repeat A1c  Patient reports some hypoglycemic episode after meals Will decrease pre meal insulin from 8-5 units  If patient is taking smaller meals she may take 3 units  Patient has coronary artery disease and medical management is recommended  Aggressive blood sugar, cholesterol and blood pressures is recommended  Last LDL of 44 is at goal  Patient depression is under control and is being followed by [facility]  Patient has gastroparesis will use Reglan  Patient has use Reglan previously as well with much help  Will use Linzess for constipation  Patient complains of chronic low back pain advised patient to take Tylenol as needed and that seems to help  If needed patient may take lidocaine patches or diclofenac gel  Patient last labs suggested anemia.  Will repeat CBC  Medication List with Changes/Refills   Current Medications    ACETAMINOPHEN (TYLENOL) 500 MG TABLET    Take 500 mg by mouth every 6 (six) hours as needed for Pain.    ASPIRIN 81 MG CHEW    CHEW AND SWALLOW 1 TABLET BY MOUTH ONCE DAILY    ATORVASTATIN (LIPITOR) 40 MG TABLET    Take 1 tablet (40 mg total) by mouth once daily.    BLOOD SUGAR DIAGNOSTIC (ONETOUCH ULTRA TEST) STRP    CHECK BLOOD GLUCOSE 3 TIMES A DAY    CEPHALEXIN (KEFLEX) 250 MG CAPSULE    Take 1 capsule (250 mg total) by mouth Daily.    DICLOFENAC SODIUM (SOLARAZE) 3 % GEL    Apply topically 2 (two) times daily.    DOCUSATE SODIUM (COLACE) 100 MG CAPSULE    Take 100 mg by mouth 2 (two) times daily.    DULOXETINE (CYMBALTA) 30 MG CAPSULE    Take 30 mg by mouth once daily.    ESTRADIOL  "(ESTRACE) 0.01 % (0.1 MG/GRAM) VAGINAL CREAM    PLACE 1 GRAM VAGINALLY EVERY MONDAY, WEDNESDAY, FRIDAY    FERROUS SULFATE 325 (65 FE) MG EC TABLET    Take 1 tablet (325 mg total) by mouth once daily.    FUROSEMIDE (LASIX) 40 MG TABLET    Take 1 tablet (40 mg total) by mouth once daily.    GABAPENTIN (NEURONTIN) 300 MG CAPSULE    TAKE 1 CAPSULE BY MOUTH THREE TIMES DAILY    HUMALOG KWIKPEN INSULIN 100 UNIT/ML PEN    5 units before each meal.    INSULIN (LANTUS SOLOSTAR U-100 INSULIN) GLARGINE 100 UNITS/ML SUBQ PEN    Inject 20 Units into the skin 2 (two) times a day.    ISOSORBIDE MONONITRATE (IMDUR) 30 MG 24 HR TABLET    Take 1 tablet (30 mg total) by mouth once daily.    LACTOBACILLUS RHAMNOSUS-FIBER ORAL    Take 1 capsule by mouth Daily.    LANCETS 32 GAUGE MISC    1 lancet by Misc.(Non-Drug; Combo Route) route 3 (three) times daily.    LOPERAMIDE (IMODIUM) 2 MG CAPSULE    Take 2 mg by mouth 4 (four) times daily as needed for Diarrhea.    LOSARTAN (COZAAR) 100 MG TABLET    TAKE 1 TABLET BY MOUTH ONCE DAILY FOR 30 DAYS    METOPROLOL TARTRATE (LOPRESSOR) 50 MG TABLET    Take 50 mg by mouth 2 (two) times daily.    MIRTAZAPINE (REMERON) 15 MG TABLET    Take 15 mg by mouth every evening.    MONTELUKAST (SINGULAIR) 10 MG TABLET    TAKE 1 TABLET BY MOUTH ONCE DAILY FOR 30 DAYS    OMEPRAZOLE (PRILOSEC) 40 MG CAPSULE    Take 40 mg by mouth once daily.    PEN NEEDLE, DIABETIC (BD ULTRA-FINE SHORT PEN NEEDLE) 31 GAUGE X 5/16" NDLE    1 each by Misc.(Non-Drug; Combo Route) route 5 (five) times daily.    POTASSIUM CHLORIDE SA (K-DUR,KLOR-CON) 20 MEQ TABLET    Take 1 tablet by mouth twice daily   Discontinued Medications    BLOOD-GLUCOSE METER KIT    Use as instructed    FLASH GLUCOSE SCANNING READER (FREESTYLE CRISTOBAL 2 READER) MISC    1 each by Misc.(Non-Drug; Combo Route) route once daily at 6am.    FLASH GLUCOSE SENSOR (FREESTYLE CRISTOBAL 2 SENSOR) KIT    USE 1 SENSOR EVERY 14 DAYS    INSULIN LISPRO 100 UNIT/ML INJECTION    " Inject into the skin 3 (three) times daily before meals.    TURMERIC ORAL    Take by mouth.

## 2023-03-01 DIAGNOSIS — D64.9 ANEMIA, UNSPECIFIED TYPE: Primary | ICD-10-CM

## 2023-03-01 LAB
ALBUMIN SERPL-MCNC: 3.8 G/DL (ref 3.6–5.1)
ALBUMIN/GLOB SERPL: 1.1 (CALC) (ref 1–2.5)
ALP SERPL-CCNC: 72 U/L (ref 37–153)
ALT SERPL-CCNC: 23 U/L (ref 6–29)
AST SERPL-CCNC: 20 U/L (ref 10–35)
BASOPHILS # BLD AUTO: 30 CELLS/UL (ref 0–200)
BASOPHILS NFR BLD AUTO: 0.4 %
BILIRUB SERPL-MCNC: 0.3 MG/DL (ref 0.2–1.2)
BUN SERPL-MCNC: 26 MG/DL (ref 7–25)
BUN/CREAT SERPL: 19 (CALC) (ref 6–22)
CALCIUM SERPL-MCNC: 10 MG/DL (ref 8.6–10.4)
CHLORIDE SERPL-SCNC: 108 MMOL/L (ref 98–110)
CO2 SERPL-SCNC: 29 MMOL/L (ref 20–32)
CREAT SERPL-MCNC: 1.35 MG/DL (ref 0.5–1.05)
EGFR: 44 ML/MIN/1.73M2
EOSINOPHIL # BLD AUTO: 148 CELLS/UL (ref 15–500)
EOSINOPHIL NFR BLD AUTO: 2 %
ERYTHROCYTE [DISTWIDTH] IN BLOOD BY AUTOMATED COUNT: 14.1 % (ref 11–15)
GLOBULIN SER CALC-MCNC: 3.5 G/DL (CALC) (ref 1.9–3.7)
GLUCOSE SERPL-MCNC: 52 MG/DL (ref 65–139)
HBA1C MFR BLD: 6.3 % OF TOTAL HGB
HCT VFR BLD AUTO: 29.8 % (ref 35–45)
HGB BLD-MCNC: 9.9 G/DL (ref 11.7–15.5)
LYMPHOCYTES # BLD AUTO: 2605 CELLS/UL (ref 850–3900)
LYMPHOCYTES NFR BLD AUTO: 35.2 %
MCH RBC QN AUTO: 30.1 PG (ref 27–33)
MCHC RBC AUTO-ENTMCNC: 33.2 G/DL (ref 32–36)
MCV RBC AUTO: 90.6 FL (ref 80–100)
MONOCYTES # BLD AUTO: 562 CELLS/UL (ref 200–950)
MONOCYTES NFR BLD AUTO: 7.6 %
NEUTROPHILS # BLD AUTO: 4055 CELLS/UL (ref 1500–7800)
NEUTROPHILS NFR BLD AUTO: 54.8 %
PLATELET # BLD AUTO: 324 THOUSAND/UL (ref 140–400)
PMV BLD REES-ECKER: 10.1 FL (ref 7.5–12.5)
POTASSIUM SERPL-SCNC: 5 MMOL/L (ref 3.5–5.3)
PROT SERPL-MCNC: 7.3 G/DL (ref 6.1–8.1)
RBC # BLD AUTO: 3.29 MILLION/UL (ref 3.8–5.1)
SODIUM SERPL-SCNC: 143 MMOL/L (ref 135–146)
TSH SERPL-ACNC: 1.09 MIU/L (ref 0.4–4.5)
WBC # BLD AUTO: 7.4 THOUSAND/UL (ref 3.8–10.8)

## 2023-03-07 ENCOUNTER — TELEPHONE (OUTPATIENT)
Dept: PRIMARY CARE CLINIC | Facility: CLINIC | Age: 63
End: 2023-03-07
Payer: MEDICARE

## 2023-03-07 NOTE — TELEPHONE ENCOUNTER
----- Message from Frannie Zeng sent at 3/7/2023  1:32 PM CST -----  Regarding: Current Lab Results  Contact: [facility]  Per phone call with Syed she stated that they are needing current lab results for 02/2023.  Please return call at [phone] and fax's [phone].    Thanks,  SJ

## 2023-03-13 RX ORDER — ESTRADIOL 0.1 MG/G
CREAM VAGINAL
Qty: 60 G | Refills: 1 | Status: SHIPPED | OUTPATIENT
Start: 2023-03-13 | End: 2023-08-01

## 2023-03-20 NOTE — TELEPHONE ENCOUNTER
----- Message from Mimi Zambrano sent at 3/20/2023  9:33 AM CDT -----  Contact: Leanna(daughter)  Type:  RX Refill Request    Who Called: Leanna Hoffman   Refill or New Rx:refill  RX Name and Strength:cephALEXin (KEFLEX) 250 MG capsule  How is the patient currently taking it? (ex. 1XDay):1xday  Is this a 30 day or 90 day RX:30  Preferred Pharmacy with phone number: [facility] [address] [place] [address] (Ph: [phone])  Local or Mail Order:local   Ordering Provider:Mauricio Vargas   Would the patient rather a call back or a response via [facility]? Call back   Best Call Back Number:[phone]  Additional Information:

## 2023-03-21 RX ORDER — CEPHALEXIN 250 MG/1
250 CAPSULE ORAL DAILY
Qty: 30 CAPSULE | Refills: 2 | Status: SHIPPED | OUTPATIENT
Start: 2023-03-21 | End: 2023-05-03 | Stop reason: SDUPTHER

## 2023-03-31 ENCOUNTER — PATIENT MESSAGE (OUTPATIENT)
Dept: FAMILY MEDICINE | Facility: CLINIC | Age: 63
End: 2023-03-31
Payer: MEDICARE

## 2023-04-17 ENCOUNTER — OFFICE VISIT (OUTPATIENT)
Dept: PRIMARY CARE CLINIC | Facility: CLINIC | Age: 63
End: 2023-04-17
Payer: MEDICARE

## 2023-04-17 VITALS
TEMPERATURE: 97 F | HEIGHT: 59 IN | RESPIRATION RATE: 16 BRPM | HEART RATE: 67 BPM | BODY MASS INDEX: 27.49 KG/M2 | OXYGEN SATURATION: 98 % | WEIGHT: 136.38 LBS | SYSTOLIC BLOOD PRESSURE: 122 MMHG | DIASTOLIC BLOOD PRESSURE: 54 MMHG

## 2023-04-17 DIAGNOSIS — D50.9 IRON DEFICIENCY ANEMIA, UNSPECIFIED IRON DEFICIENCY ANEMIA TYPE: ICD-10-CM

## 2023-04-17 DIAGNOSIS — E11.42 TYPE 2 DIABETES MELLITUS WITH DIABETIC POLYNEUROPATHY, WITHOUT LONG-TERM CURRENT USE OF INSULIN: Primary | ICD-10-CM

## 2023-04-17 DIAGNOSIS — N18.32 STAGE 3B CHRONIC KIDNEY DISEASE: ICD-10-CM

## 2023-04-17 LAB
% SATURATION: 12 % (ref 20–50)
ALBUMIN SERPL BCP-MCNC: 3.3 G/DL (ref 3.4–5)
ALBUMIN/GLOBULIN RATIO: 0.79 RATIO (ref 1.1–1.8)
ALP SERPL-CCNC: 90 U/L (ref 46–116)
ALT SERPL W P-5'-P-CCNC: 32 U/L (ref 12–78)
ANION GAP SERPL CALC-SCNC: 7 MMOL/L (ref 3–11)
AST SERPL-CCNC: 25 U/L (ref 15–37)
BASOPHILS NFR BLD: 0.5 % (ref 0–3)
BILIRUB SERPL-MCNC: 0.3 MG/DL (ref 0–1)
BUN SERPL-MCNC: 30 MG/DL (ref 7–18)
BUN/CREAT SERPL: 16.66 RATIO (ref 7–18)
CALCIUM SERPL-MCNC: 9 MG/DL (ref 8.8–10.5)
CHLORIDE SERPL-SCNC: 99 MMOL/L (ref 100–108)
CO2 SERPL-SCNC: 35 MMOL/L (ref 21–32)
CREAT SERPL-MCNC: 1.8 MG/DL (ref 0.55–1.02)
EOSINOPHIL NFR BLD: 3.1 % (ref 1–3)
ERYTHROCYTE [DISTWIDTH] IN BLOOD BY AUTOMATED COUNT: 13.7 % (ref 12.5–18)
FERRITIN SERPL-MCNC: 108 NG/ML (ref 8–388)
GFR ESTIMATION: 34
GLOBULIN: 4.2 G/DL (ref 2.3–3.5)
GLUCOSE SERPL-MCNC: 186 MG/DL (ref 70–110)
HCT VFR BLD AUTO: 31.2 % (ref 37–47)
HGB BLD-MCNC: 10.1 G/DL (ref 12–16)
IRON: 32 UG/DL (ref 26–170)
LYMPHOCYTES NFR BLD: 15.8 % (ref 25–40)
MCH RBC QN AUTO: 29.4 PG (ref 27–31.2)
MCHC RBC AUTO-ENTMCNC: 32.4 G/DL (ref 31.8–35.4)
MCV RBC AUTO: 90.7 FL (ref 80–97)
MONOCYTES NFR BLD: 7 % (ref 1–15)
NEUTROPHILS # BLD AUTO: 7.83 10*3/UL (ref 1.8–7.7)
NEUTROPHILS NFR BLD: 73.2 % (ref 37–80)
NUCLEATED RED BLOOD CELLS: 0 %
PLATELETS: 362 10*3/UL (ref 142–424)
POTASSIUM SERPL-SCNC: 3.6 MMOL/L (ref 3.6–5.2)
PROT SERPL-MCNC: 7.5 G/DL (ref 6.4–8.2)
RBC # BLD AUTO: 3.44 10*6/UL (ref 4.2–5.4)
SODIUM BLD-SCNC: 141 MMOL/L (ref 135–145)
TOTAL IRON BINDING CAPACITY: 264 UG/DL (ref 250–450)
WBC # BLD: 10.7 10*3/UL (ref 4.6–10.2)

## 2023-04-17 PROCEDURE — 95251 CONT GLUC MNTR ANALYSIS I&R: CPT | Mod: S$GLB,,, | Performed by: INTERNAL MEDICINE

## 2023-04-17 PROCEDURE — 99214 OFFICE O/P EST MOD 30 MIN: CPT | Mod: 25,S$GLB,, | Performed by: INTERNAL MEDICINE

## 2023-04-17 PROCEDURE — 95251 PR GLUCOSE MONITOR, 72 HOUR, PHYS INTERP: ICD-10-PCS | Mod: S$GLB,,, | Performed by: INTERNAL MEDICINE

## 2023-04-17 PROCEDURE — 99214 PR OFFICE/OUTPT VISIT, EST, LEVL IV, 30-39 MIN: ICD-10-PCS | Mod: 25,S$GLB,, | Performed by: INTERNAL MEDICINE

## 2023-04-17 RX ORDER — HYDRALAZINE HYDROCHLORIDE 100 MG/1
100 TABLET, FILM COATED ORAL 2 TIMES DAILY
COMMUNITY
Start: 2023-04-07 | End: 2023-05-24

## 2023-04-17 RX ORDER — METOLAZONE 2.5 MG/1
TABLET ORAL
COMMUNITY
Start: 2023-04-11 | End: 2024-01-05

## 2023-04-17 NOTE — PROGRESS NOTES
Subjective:      Patient ID: Radha Hoffman is a 63 y.o. female.    Chief Complaint: Back Pain, Edema (Pt c/o swelling all over about 2 weeks ago.. pt went to [facility] on [address] and was sent to [facility] because the fluid was causing the SOB), Shortness of Breath (Pt c/o SOB about 2 weeks ago due to the swelling (per [facility])), and Cough (Pt c/o coughing and wheezing for the past 2 weeks)    HPI:  With diabetes that seem under good control with last A1c of 6.3.  Patient is taking Lantus 16 units in the morning and Humalog 5 units before each meal.  Patient has CGM placed that suggest that blood sugars are under good control with average blood glucose of 142 with standard deviation of 51.  Patient blood sugars are in good range in 74% of the time running high 19% of the time and very high 3% of the time.  Patient also has lows 2% of the time and very low 2% of the time.  Multiple hypoglycemic episodes are noted between 3 and 6:00 a.m..  Blood sugars seem to be running high in the afternoon after dinner.      Angiogram was done by Dr. Barrios, that showed moderate 3 vessel coronary artery disease.  No stents were placed and medical management was recommended.  Patient is on aspirin, beta blocker, ARB, statin.  Patient also complains of bilateral ankle swelling x long + Patient blood pressure seem under good control + last LDL of 44 is at goal.  Patient had echocardiogram schedule that she missed Will try to get another echocardiogram done.  Patient recently went to urgent care secondary to shortness of breath and bilateral leg and face swelling.  Patient was transferred to [facility] and was aggressively diuresed.  Patient was taking Lasix half tablet that was increased to 40 mg twice a day and metolazone was added.  Patient reports improved swelling.     Review of Systems   Constitutional:  Negative for chills, diaphoresis, fever, malaise/fatigue and weight loss.   HENT:   "Negative for congestion, ear pain, sinus pain, sore throat and tinnitus.    Eyes:  Negative for blurred vision and photophobia.   Respiratory:  Positive for shortness of breath. Negative for cough, hemoptysis and wheezing.    Cardiovascular:  Negative for chest pain, palpitations, orthopnea, leg swelling and PND.   Gastrointestinal:  Negative for abdominal pain, blood in stool, constipation, diarrhea, heartburn, melena, nausea and vomiting.   Genitourinary:  Negative for dysuria, frequency and urgency.   Musculoskeletal:  Negative for back pain, myalgias and neck pain.   Skin:  Negative for rash.   Neurological:  Negative for dizziness, tremors, seizures, loss of consciousness and weakness.   Endo/Heme/Allergies:  Negative for polydipsia.   Psychiatric/Behavioral:  Negative for depression and hallucinations. The patient does not have insomnia.  :    Objective:   BP (!) 122/54 (BP Location: Left arm, Patient Position: Sitting, BP Method: Medium (Automatic))   Pulse 67   Temp 96.7 °F (35.9 °C) (Temporal)   Resp 16   Ht 4' 11" (1.499 m)   Wt 61.9 kg (136 lb 6.4 oz)   SpO2 98%   BMI 27.55 kg/m²     Physical Exam  Constitutional:       General: She is not in acute distress.     Appearance: She is not diaphoretic.   Neck:      Thyroid: No thyromegaly.   Cardiovascular:      Rate and Rhythm: Normal rate and regular rhythm.      Heart sounds: Normal heart sounds. No murmur heard.  Pulmonary:      Effort: Pulmonary effort is normal. No respiratory distress.      Breath sounds: Normal breath sounds. No wheezing.   Abdominal:      General: Bowel sounds are normal. There is no distension.      Palpations: Abdomen is soft.      Tenderness: There is no abdominal tenderness.   Lymphadenopathy:      Cervical: No cervical adenopathy.   Neurological:      Mental Status: She is alert and oriented to person, place, and time.   Psychiatric:         Behavior: Behavior normal.         Thought Content: Thought content normal.        "  Judgment: Judgment normal.   :    Assessment:       ICD-10-CM ICD-9-CM   1. Type 2 diabetes mellitus with diabetic polyneuropathy, without long-term current use of insulin  E11.42 250.60     357.2   2. Stage 3b chronic kidney disease  N18.32 585.3   3. Iron deficiency anemia, unspecified iron deficiency anemia type  D50.9 280.9       Plan:     Patient last A1c of 6.3 is at goal.  CGM suggest that early morning blood sugars are running low  Will decrease Lantus to 12 units  Patient blood sugar seem to be running high after dinner sometimes.  Advised to adhere with diet and if has having a bigger meal patient may take 8 units before  Patient was recently evaluated in ER secondary to shortness of breath and edema  Patient BNP of 290 can be secondary to chronic kidney disease and not because of volume overload  Patient Lasix was increased and was started on metolazone.  Patient seemed dry and the labs done suggest that patient is dehydrated with increase in creatinine  Advised patient to do crease Lasix to 40 once a day + stop metolazone  Patient has history of iron deficiency anemia..  Patient was referred to general surgery for colonoscopy  Advised patient to make appointment again  Patient anemia can be contributing to patient's shortness of breath..  Will discuss IV iron infusion if needed  Echocardiogram is ordered but not yet done Will follow-up on echocardiogram.  Plan discussed with nephrologist Dr. Florez.     Medication List with Changes/Refills   Current Medications    ACETAMINOPHEN (TYLENOL) 500 MG TABLET    Take 500 mg by mouth every 6 (six) hours as needed for Pain.    ASPIRIN 81 MG CHEW    CHEW AND SWALLOW 1 TABLET BY MOUTH ONCE DAILY    ATORVASTATIN (LIPITOR) 40 MG TABLET    Take 1 tablet (40 mg total) by mouth once daily.    BLOOD SUGAR DIAGNOSTIC (ONETOUCH ULTRA TEST) STRP    CHECK BLOOD GLUCOSE 3 TIMES A DAY    CEPHALEXIN (KEFLEX) 250 MG CAPSULE    Take 1 capsule (250 mg total) by mouth Daily.     "DICLOFENAC SODIUM (SOLARAZE) 3 % GEL    Apply topically 2 (two) times daily.    DULOXETINE (CYMBALTA) 30 MG CAPSULE    Take 30 mg by mouth once daily.    ESTRADIOL (ESTRACE) 0.01 % (0.1 MG/GRAM) VAGINAL CREAM    PLACE 1 GRAM VAGINALLY EVERY MONDAY, WEDNESDAY, FRIDAY    FUROSEMIDE (LASIX) 40 MG TABLET    Take 1 tablet (40 mg total) by mouth once daily.    GABAPENTIN (NEURONTIN) 300 MG CAPSULE    TAKE 1 CAPSULE BY MOUTH THREE TIMES DAILY    HUMALOG KWIKPEN INSULIN 100 UNIT/ML PEN    8 units before each meal.    HYDRALAZINE (APRESOLINE) 100 MG TABLET    Take 100 mg by mouth 2 (two) times daily.    INSULIN (LANTUS SOLOSTAR U-100 INSULIN) GLARGINE 100 UNITS/ML SUBQ PEN    Inject 20 Units into the skin 2 (two) times a day.    LACTOBACILLUS RHAMNOSUS-FIBER ORAL    Take 1 capsule by mouth Daily.    LANCETS 32 GAUGE MISC    1 lancet by Misc.(Non-Drug; Combo Route) route 3 (three) times daily.    LOSARTAN (COZAAR) 100 MG TABLET    TAKE 1 TABLET BY MOUTH ONCE DAILY FOR 30 DAYS    METOLAZONE (ZAROXOLYN) 2.5 MG TABLET    TAKE 1 TABLET BY MOUTH EVERY OTHER DAY FOR 5 DOSES    METOPROLOL TARTRATE (LOPRESSOR) 50 MG TABLET    Take 50 mg by mouth 2 (two) times daily.    MIRTAZAPINE (REMERON) 15 MG TABLET    Take 15 mg by mouth every evening.    MONTELUKAST (SINGULAIR) 10 MG TABLET    TAKE 1 TABLET BY MOUTH ONCE DAILY FOR 30 DAYS    OMEPRAZOLE (PRILOSEC) 40 MG CAPSULE    Take 40 mg by mouth once daily.    PEN NEEDLE, DIABETIC (BD ULTRA-FINE SHORT PEN NEEDLE) 31 GAUGE X 5/16" NDLE    1 each by Misc.(Non-Drug; Combo Route) route 5 (five) times daily.   Discontinued Medications    DOCUSATE SODIUM (COLACE) 100 MG CAPSULE    Take 100 mg by mouth 2 (two) times daily.    FERROUS SULFATE 325 (65 FE) MG EC TABLET    Take 1 tablet (325 mg total) by mouth once daily.    ISOSORBIDE MONONITRATE (IMDUR) 30 MG 24 HR TABLET    Take 1 tablet (30 mg total) by mouth once daily.    LINACLOTIDE (LINZESS) 145 MCG CAP CAPSULE    Take 1 capsule (145 mcg " total) by mouth before breakfast.    LOPERAMIDE (IMODIUM) 2 MG CAPSULE    Take 2 mg by mouth 4 (four) times daily as needed for Diarrhea.    METOCLOPRAMIDE HCL (REGLAN) 10 MG TABLET    Take 1 tablet (10 mg total) by mouth 3 (three) times daily before meals.    POTASSIUM CHLORIDE SA (K-DUR,KLOR-CON) 20 MEQ TABLET    Take 1 tablet by mouth twice daily

## 2023-05-03 ENCOUNTER — OFFICE VISIT (OUTPATIENT)
Dept: UROLOGY | Facility: CLINIC | Age: 63
End: 2023-05-03
Payer: MEDICARE

## 2023-05-03 VITALS
WEIGHT: 136 LBS | BODY MASS INDEX: 27.42 KG/M2 | HEART RATE: 63 BPM | DIASTOLIC BLOOD PRESSURE: 55 MMHG | SYSTOLIC BLOOD PRESSURE: 128 MMHG | HEIGHT: 59 IN

## 2023-05-03 DIAGNOSIS — N30.00 ACUTE CYSTITIS WITHOUT HEMATURIA: Primary | ICD-10-CM

## 2023-05-03 PROCEDURE — 99214 PR OFFICE/OUTPT VISIT, EST, LEVL IV, 30-39 MIN: ICD-10-PCS | Mod: S$GLB,,, | Performed by: NURSE PRACTITIONER

## 2023-05-03 PROCEDURE — 99214 OFFICE O/P EST MOD 30 MIN: CPT | Mod: S$GLB,,, | Performed by: NURSE PRACTITIONER

## 2023-05-03 RX ORDER — CEPHALEXIN 250 MG/1
250 CAPSULE ORAL DAILY
Qty: 90 CAPSULE | Refills: 2 | Status: SHIPPED | OUTPATIENT
Start: 2023-05-03

## 2023-05-03 NOTE — PROGRESS NOTES
Subjective:       Patient ID: Radha Hoffman is a 63 y.o. female.    Chief Complaint: Other (3mth fu)      HPI: 63-year-old female 3 month follow-up history of recurring UTIs.  I put her on suppression low-dose antibiotic.  Since that time she is had no urinary tract infections denies any irritative voiding symptoms.  States she feels well and has been doing well.  She needs refill on medicine today.       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 Abdalla    Stomach ulcer     Urinary retention     Urinary tract infection        Past Surgical Historical:   Past Surgical History:   Procedure Laterality Date    CATARACT EXTRACTION, BILATERAL      Catherization of heart      endo scopy      HYSTERECTOMY          Medications:   Medication List with Changes/Refills   Current Medications    ACETAMINOPHEN (TYLENOL) 500 MG TABLET    Take 500 mg by mouth every 6 (six) hours as needed for Pain.    ASPIRIN 81 MG CHEW    CHEW AND SWALLOW 1 TABLET BY MOUTH ONCE DAILY    ATORVASTATIN (LIPITOR) 40 MG TABLET    Take 1 tablet (40 mg total) by mouth once daily.    BLOOD SUGAR DIAGNOSTIC (ONETOUCH ULTRA TEST) STRP    CHECK BLOOD GLUCOSE 3 TIMES A DAY    CEPHALEXIN (KEFLEX) 250 MG CAPSULE    Take 1 capsule (250 mg total) by mouth Daily.    DICLOFENAC SODIUM (SOLARAZE) 3 % GEL    Apply topically 2 (two) times daily.    DULOXETINE (CYMBALTA) 30 MG CAPSULE    Take 30 mg by mouth once daily.    ESTRADIOL (ESTRACE) 0.01 % (0.1 MG/GRAM) VAGINAL CREAM    PLACE 1 GRAM VAGINALLY EVERY MONDAY, WEDNESDAY, FRIDAY    FUROSEMIDE  "(LASIX) 40 MG TABLET    Take 1 tablet (40 mg total) by mouth once daily.    GABAPENTIN (NEURONTIN) 300 MG CAPSULE    TAKE 1 CAPSULE BY MOUTH THREE TIMES DAILY    HUMALOG KWIKPEN INSULIN 100 UNIT/ML PEN    8 units before each meal.    HYDRALAZINE (APRESOLINE) 100 MG TABLET    Take 100 mg by mouth 2 (two) times daily.    INSULIN (LANTUS SOLOSTAR U-100 INSULIN) GLARGINE 100 UNITS/ML SUBQ PEN    Inject 20 Units into the skin 2 (two) times a day.    LACTOBACILLUS RHAMNOSUS-FIBER ORAL    Take 1 capsule by mouth Daily.    LANCETS 32 GAUGE MISC    1 lancet by Misc.(Non-Drug; Combo Route) route 3 (three) times daily.    LOSARTAN (COZAAR) 100 MG TABLET    TAKE 1 TABLET BY MOUTH ONCE DAILY FOR 30 DAYS    METOLAZONE (ZAROXOLYN) 2.5 MG TABLET    TAKE 1 TABLET BY MOUTH EVERY OTHER DAY FOR 5 DOSES    METOPROLOL TARTRATE (LOPRESSOR) 50 MG TABLET    Take 50 mg by mouth 2 (two) times daily.    MIRTAZAPINE (REMERON) 15 MG TABLET    Take 15 mg by mouth every evening.    MONTELUKAST (SINGULAIR) 10 MG TABLET    TAKE 1 TABLET BY MOUTH ONCE DAILY FOR 30 DAYS    OMEPRAZOLE (PRILOSEC) 40 MG CAPSULE    Take 40 mg by mouth once daily.    PEN NEEDLE, DIABETIC (BD ULTRA-FINE SHORT PEN NEEDLE) 31 GAUGE X 5/16" NDLE    1 each by Misc.(Non-Drug; Combo Route) route 5 (five) times daily.        Past Social History:   Social History     Socioeconomic History    Marital status: Single    Number of children: 3   Occupational History    Occupation: nurses aide   Tobacco Use    Smoking status: Never    Smokeless tobacco: Never   Substance and Sexual Activity    Alcohol use: No    Drug use: No    Sexual activity: Never       Allergies:   Review of patient's allergies indicates:   Allergen Reactions    Metformin     Phenazopyridine     Sulfa (sulfonamide antibiotics)         Family History:   Family History   Problem Relation Age of Onset    Arthritis Mother     Depression Mother     Diabetes Mother     Heart disease Mother     Hypertension Mother     " Arthritis Father     Depression Father     Arthritis Sister     Diabetes Sister     Arthritis Brother         Review of Systems:  Review of Systems   Constitutional:  Negative for activity change and appetite change.   HENT:  Negative for congestion and dental problem.    Respiratory:  Negative for chest tightness and shortness of breath.    Cardiovascular:  Negative for chest pain.   Gastrointestinal:  Negative for abdominal distention and abdominal pain.   Genitourinary:  Negative for decreased urine volume, difficulty urinating, dyspareunia, dysuria, enuresis, flank pain, frequency, genital sores, hematuria, pelvic pain and urgency.   Musculoskeletal:  Negative for back pain and neck pain.   Allergic/Immunologic: Negative for immunocompromised state.   Neurological:  Negative for dizziness.   Hematological:  Negative for adenopathy.   Psychiatric/Behavioral:  Negative for agitation, behavioral problems and confusion.      Physical Exam:  Physical Exam  Constitutional:       Appearance: She is well-developed.   HENT:      Head: Normocephalic and atraumatic.   Eyes:      General: No scleral icterus.  Pulmonary:      Effort: Pulmonary effort is normal.      Breath sounds: Normal breath sounds.   Abdominal:      General: There is no distension.      Palpations: Abdomen is soft.      Tenderness: There is no abdominal tenderness.   Genitourinary:     Exam position: Supine.      Labia:         Right: No rash, tenderness or lesion.         Left: No rash, tenderness or lesion.       Vagina: Normal.      Rectum: Normal.   Musculoskeletal:      Cervical back: Normal range of motion.   Skin:     General: Skin is warm and dry.   Neurological:      Mental Status: She is alert and oriented to person, place, and time.       Assessment/Plan:   Recurring UTI--has been asymptomatic since going on suppression therapy.  Urinalysis is negative for UTI today.  Patient is asymptomatic.  Refilled her antibiotic will see her back in 3  months sooner as needed  Problem List Items Addressed This Visit    None  Visit Diagnoses       Acute cystitis without hematuria    -  Primary    Relevant Orders    POCT Urinalysis (w/Micro Option)

## 2023-05-05 DIAGNOSIS — R60.0 LOCALIZED EDEMA: ICD-10-CM

## 2023-05-05 DIAGNOSIS — J30.9 ALLERGIC RHINITIS, UNSPECIFIED SEASONALITY, UNSPECIFIED TRIGGER: ICD-10-CM

## 2023-05-05 DIAGNOSIS — I10 ESSENTIAL HYPERTENSION: ICD-10-CM

## 2023-05-05 RX ORDER — FUROSEMIDE 40 MG/1
20 TABLET ORAL DAILY
Qty: 15 TABLET | Refills: 11 | Status: SHIPPED | OUTPATIENT
Start: 2023-05-05 | End: 2023-05-24 | Stop reason: SDUPTHER

## 2023-05-05 RX ORDER — ATORVASTATIN CALCIUM 40 MG/1
40 TABLET, FILM COATED ORAL DAILY
Qty: 90 TABLET | Refills: 3 | Status: SHIPPED | OUTPATIENT
Start: 2023-05-05

## 2023-05-05 RX ORDER — MONTELUKAST SODIUM 10 MG/1
TABLET ORAL
Qty: 90 TABLET | Refills: 3 | Status: SHIPPED | OUTPATIENT
Start: 2023-05-05

## 2023-05-05 RX ORDER — LOSARTAN POTASSIUM 100 MG/1
TABLET ORAL
Qty: 90 TABLET | Refills: 3 | Status: SHIPPED | OUTPATIENT
Start: 2023-05-05 | End: 2024-01-05

## 2023-05-05 NOTE — TELEPHONE ENCOUNTER
----- Message from Maryanne Frost LPN sent at 5/3/2023  2:55 PM CDT -----  Contact: Kirsty/Daughter    ----- Message -----  From: Coreen Celaya  Sent: 5/3/2023  11:07 AM CDT  To: Priyanka Nowak Staff    Type:  RX Refill Request    Who Called:  Kirsty  Refill or New Rx: refill   RX Name and Strength: Atorvastatin (LIPITOR) 40 MG tablet  How is the patient currently taking it? (ex. 1XDay):1xday  Is this a 30 day or 90 day RX: 90  Preferred Pharmacy with phone number:   [address] [place] [id number] [address] [address]  Phone: [phone] Fax: [phone]  Local or Mail Order: Local  Ordering Provider: Dr. Mathew  Would the patient rather a call back or a response via [facility]? Call back   Best Call Back Number: Please call her at [phone]  Additional Information:     Type:  RX Refill Request    Who Called:  Kirsty  Refill or New Rx: refill   RX Name and Strength: Losartan (COZAAR) 100 MG tablet  How is the patient currently taking it? (ex. 1XDay):1xday  Is this a 30 day or 90 day RX: 90  Preferred Pharmacy with phone number:   [address] [facility]  [address] [address]  Phone: [phone] Fax: [phone]  Local or Mail Order: Local  Ordering Provider: Dr. Mathew  Would the patient rather a call back or a response via [facility]? Call back   Best Call Back Number: Please call her at [phone]  Additional Information:     Type:  RX Refill Request    Who Called:  Kirsty  Refill or New Rx: refill   RX Name and Strength: Montelukast (SINGULAIR) 10 mg tablet  How is the patient currently taking it? (ex. 1XDay):1xday  Is this a 30 day or 90 day RX: 90  Preferred Pharmacy with phone number:   [facility] [address] [place] [address]  14  [address] [address]  Phone: [phone] Fax: [phone]  Local or Mail Order: Local  Ordering Provider: Dr. Mathew  Would the patient rather a call back or a  response via [facility]? Call back   Best Call Back Number: Please call her at [phone]  Additional Information:     Type:  RX Refill Request    Who Called:  Kirsty  Refill or New Rx: refill   RX Name and Strength: Furosemide (LASIX) 40 MG tablet  How is the patient currently taking it? (ex. 1XDay):2xday  Is this a 30 day or 90 day RX: 90  Preferred Pharmacy with phone number:   [facility] [place], [place] - [address] [facility]  [address] [address]  Phone: [phone] Fax: [phone]  Local or Mail Order: Local  Ordering Provider: Dr. Mathew  Would the patient rather a call back or a response via [facility]? Call back   Best Call Back Number: Please call her at [phone]  Additional Information:

## 2023-05-22 ENCOUNTER — PATIENT OUTREACH (OUTPATIENT)
Dept: ADMINISTRATIVE | Facility: HOSPITAL | Age: 63
End: 2023-05-22
Payer: MEDICARE

## 2023-05-22 NOTE — LETTER
AUTHORIZATION FOR RELEASE OF   CONFIDENTIAL INFORMATION    Dear [facility]    We are seeing Radha Hoffman, date of birth 1960, in the clinic at [facility]. Eliu Mathew MD is the patient's PCP. Radha Hoffman has an outstanding lab/procedure at the time we reviewed her chart. In order to help keep her health information updated, she has authorized us to request the following medical record(s):        (  )  MAMMOGRAM                                      (  )  COLONOSCOPY      (  )  PAP SMEAR                                          (  )  OUTSIDE LAB RESULTS     (  )  DEXA SCAN                                          (X)  EYE EXAM            (  )  FOOT EXAM                                          (  )  ENTIRE RECORD     (  )  OUTSIDE IMMUNIZATIONS                 (  )  _______________         Please fax records to [facility], Michelle H. LPN-CC at [phone]     If you have any questions, please contact Angela ARCE at [phone]          Patient Name: Radha Hoffman  : 1960  Patient Phone #: [phone]

## 2023-05-22 NOTE — PROGRESS NOTES
Working DM Eye Report:     Patient overdue for DM eye exam.  No recent eye exam on file. Left answer for a return call.

## 2023-05-24 ENCOUNTER — OFFICE VISIT (OUTPATIENT)
Dept: PRIMARY CARE CLINIC | Facility: CLINIC | Age: 63
End: 2023-05-24
Payer: MEDICARE

## 2023-05-24 VITALS
SYSTOLIC BLOOD PRESSURE: 154 MMHG | OXYGEN SATURATION: 98 % | HEART RATE: 51 BPM | TEMPERATURE: 97 F | WEIGHT: 140.19 LBS | DIASTOLIC BLOOD PRESSURE: 72 MMHG | HEIGHT: 59 IN | RESPIRATION RATE: 16 BRPM | BODY MASS INDEX: 28.26 KG/M2

## 2023-05-24 DIAGNOSIS — K31.84 GASTROPARESIS DUE TO DM: ICD-10-CM

## 2023-05-24 DIAGNOSIS — I25.118 CORONARY ARTERY DISEASE OF NATIVE ARTERY OF NATIVE HEART WITH STABLE ANGINA PECTORIS: ICD-10-CM

## 2023-05-24 DIAGNOSIS — E11.42 TYPE 2 DIABETES MELLITUS WITH DIABETIC POLYNEUROPATHY, WITHOUT LONG-TERM CURRENT USE OF INSULIN: Primary | ICD-10-CM

## 2023-05-24 DIAGNOSIS — R60.0 LOCALIZED EDEMA: ICD-10-CM

## 2023-05-24 DIAGNOSIS — E11.43 GASTROPARESIS DUE TO DM: ICD-10-CM

## 2023-05-24 DIAGNOSIS — I10 BENIGN ESSENTIAL HTN: ICD-10-CM

## 2023-05-24 PROCEDURE — 99214 OFFICE O/P EST MOD 30 MIN: CPT | Mod: S$GLB,,, | Performed by: INTERNAL MEDICINE

## 2023-05-24 PROCEDURE — 99214 PR OFFICE/OUTPT VISIT, EST, LEVL IV, 30-39 MIN: ICD-10-PCS | Mod: S$GLB,,, | Performed by: INTERNAL MEDICINE

## 2023-05-24 RX ORDER — NAPROXEN SODIUM 220 MG/1
81 TABLET, FILM COATED ORAL DAILY
Qty: 90 TABLET | Refills: 3 | Status: SHIPPED | OUTPATIENT
Start: 2023-05-24 | End: 2024-01-05

## 2023-05-24 RX ORDER — FUROSEMIDE 40 MG/1
TABLET ORAL
Qty: 130 TABLET | Refills: 3 | Status: SHIPPED | OUTPATIENT
Start: 2023-05-24 | End: 2023-09-26 | Stop reason: SDUPTHER

## 2023-05-24 RX ORDER — HYDRALAZINE HYDROCHLORIDE 50 MG/1
50 TABLET, FILM COATED ORAL EVERY 12 HOURS
Qty: 60 TABLET | Refills: 11 | Status: SHIPPED | OUTPATIENT
Start: 2023-05-24 | End: 2024-01-05

## 2023-05-24 NOTE — PROGRESS NOTES
Subjective:      Patient ID: Radha Hoffman is a 63 y.o. female.    Chief Complaint: Diabetes (1month f/u ), Abdominal Pain (C/o upper abd pain, described as a crushing, squeezing pain, associated rash, irritation), and Follow-up (C/o throat pain, reports feeling as if fluid is stuck in her throat)     With diabetes with last A1c of 6.3 seem under good control.  Patient is taking Lantus 16 units in the morning and Humalog 5 units before each meal.  Patient has CGM placed and the data is downloaded.  Data suggest that patient average blood sugar is 160 with standard deviation of 48.  Patient blood sugars are in range 69% of the time and running high 25% of the time and very high 5% of the time.  Patient has some low and very low blood sugar readings as well.  On further evaluation it seem that blood sugars are running low between 10 and 11:00 a.m. and about 2 and 3:00 p.m. which is mostly after breakfast and lunch        Angiogram was done by Dr. Barrios, that showed moderate 3 vessel coronary artery disease.  No stents were placed and medical management was recommended.  Patient is on aspirin, beta blocker, ARB, statin.  Patient has bilateral ankle swelling and was aggressively diuresed previously.  Echocardiogram is ordered but not yet done.  Patient still complains of some leg swelling and was evaluated by Dr. Florez.  Patient was restarted on metolazone twice a week + patient is also taking Lasix 40 mg once a day.    Patient also complained of abdominal pain for long that is in right upper quadrant, pain is constricting in nature, squeezing, not associated with any rash,, no blisters.  Patient had gastric emptying study that suggested gastroparesis.  Patient denies any nausea or vomiting.       Review of Systems   Constitutional:  Negative for chills, diaphoresis, fever, malaise/fatigue and weight loss (4 lb weight gain).   HENT:  Negative for congestion, ear pain, sinus pain, sore throat and tinnitus.   "  Eyes:  Negative for blurred vision and photophobia.   Respiratory:  Negative for cough, hemoptysis, shortness of breath and wheezing.    Cardiovascular:  Negative for chest pain, palpitations, orthopnea, leg swelling and PND.   Gastrointestinal:  Positive for abdominal pain. Negative for blood in stool, constipation, diarrhea, heartburn, melena, nausea and vomiting.   Genitourinary:  Negative for dysuria, frequency and urgency.   Musculoskeletal:  Negative for back pain, myalgias and neck pain.   Skin:  Negative for rash.   Neurological:  Negative for dizziness, tremors, seizures, loss of consciousness and weakness.   Endo/Heme/Allergies:  Negative for polydipsia.   Psychiatric/Behavioral:  Negative for depression and hallucinations. The patient does not have insomnia.    Objective:   BP (!) 154/72 (BP Location: Right arm, Patient Position: Sitting, BP Method: Medium (Manual))   Pulse (!) 51   Temp 97 °F (36.1 °C) (Temporal)   Resp 16   Ht 4' 11" (1.499 m)   Wt 63.6 kg (140 lb 3.2 oz)   SpO2 98%   BMI 28.32 kg/m²     Physical Exam  Constitutional:       General: She is not in acute distress.     Appearance: She is not diaphoretic.   Neck:      Thyroid: No thyromegaly.   Cardiovascular:      Rate and Rhythm: Normal rate and regular rhythm.      Heart sounds: Normal heart sounds. No murmur heard.  Pulmonary:      Effort: Pulmonary effort is normal. No respiratory distress.      Breath sounds: Normal breath sounds. No wheezing.   Abdominal:      General: Bowel sounds are normal. There is no distension.      Palpations: Abdomen is soft.      Tenderness: There is no abdominal tenderness.   Musculoskeletal:      Right lower leg: Edema present.      Left lower leg: Edema present.   Lymphadenopathy:      Cervical: No cervical adenopathy.   Neurological:      Mental Status: She is alert and oriented to person, place, and time.   Psychiatric:         Behavior: Behavior normal.         Thought Content: Thought content " normal.         Judgment: Judgment normal.     Assessment:       ICD-10-CM ICD-9-CM   1. Type 2 diabetes mellitus with diabetic polyneuropathy, without long-term current use of insulin  E11.42 250.60     357.2   2. Localized edema  R60.0 782.3   3. Gastroparesis due to DM  E11.43 250.60    K31.84 536.3   4. Coronary artery disease of native artery of native heart with stable angina pectoris  I25.118 414.01     413.9   5. Benign essential HTN  I10 401.1       Plan:     Patient last A1c of 6.3 is at goal.  Patient is having some hypoglycemic episodes after breakfast and lunch  Will stop pre meal insulin before breakfast and lunch.   Continue 5 units of Humalog before dinner.  Patient has symptoms suggestive of gastroparesis + gastric emptying studies proved gastroparesis  Advised patient to take small frequent meals.   Patient still has some edema bilateral feet but much improved than before  Patient is taking Lasix 40 mg daily and metolazone twice a week  Advised patient to take extra Lasix when she gained more than 5 lb + feels short of breath  Patient blood pressures are running high and is taking hydralazine 25 mg only  Will increase it to 50 mg  Advised patient to come to clinic in a week to get blood pressures checked again  Echocardiogram was scheduled previously but patient missed the appointment  Will try to schedule echocardiogram again      Medication List with Changes/Refills   New Medications    HYDRALAZINE (APRESOLINE) 50 MG TABLET    Take 1 tablet (50 mg total) by mouth every 12 (twelve) hours.   Current Medications    ACETAMINOPHEN (TYLENOL) 500 MG TABLET    Take 500 mg by mouth every 6 (six) hours as needed for Pain.    ATORVASTATIN (LIPITOR) 40 MG TABLET    Take 1 tablet (40 mg total) by mouth once daily.    BLOOD SUGAR DIAGNOSTIC (ONETOUCH ULTRA TEST) [facility]    CHECK BLOOD GLUCOSE 3 TIMES A DAY    CEPHALEXIN (KEFLEX) 250 MG CAPSULE    Take 1 capsule (250 mg total) by mouth Daily.    DULOXETINE  "(CYMBALTA) 30 MG CAPSULE    Take 30 mg by mouth once daily.    ESTRADIOL (ESTRACE) 0.01 % (0.1 MG/GRAM) VAGINAL CREAM    PLACE 1 GRAM VAGINALLY EVERY MONDAY, WEDNESDAY, FRIDAY    GABAPENTIN (NEURONTIN) 300 MG CAPSULE    TAKE 1 CAPSULE BY MOUTH THREE TIMES DAILY    HUMALOG KWIKPEN INSULIN 100 UNIT/ML PEN    8 units before each meal.    INSULIN (LANTUS SOLOSTAR U-100 INSULIN) GLARGINE 100 UNITS/ML SUBQ PEN    Inject 20 Units into the skin 2 (two) times a day.    LACTOBACILLUS RHAMNOSUS-FIBER ORAL    Take 1 capsule by mouth Daily.    LANCETS 32 GAUGE MISC    1 lancet by Misc.(Non-Drug; Combo Route) route 3 (three) times daily.    LOSARTAN (COZAAR) 100 MG TABLET    TAKE 1 TABLET BY MOUTH ONCE DAILY FOR 30 DAYS    METOLAZONE (ZAROXOLYN) 2.5 MG TABLET    TAKE 1 TABLET BY MOUTH EVERY OTHER DAY FOR 5 DOSES    METOPROLOL TARTRATE (LOPRESSOR) 50 MG TABLET    Take 50 mg by mouth 2 (two) times daily.    MIRTAZAPINE (REMERON) 15 MG TABLET    Take 15 mg by mouth every evening.    MONTELUKAST (SINGULAIR) 10 MG TABLET    TAKE 1 TABLET BY MOUTH ONCE DAILY FOR 30 DAYS    OMEPRAZOLE (PRILOSEC) 40 MG CAPSULE    Take 40 mg by mouth once daily.    PEN NEEDLE, DIABETIC (BD ULTRA-FINE SHORT PEN NEEDLE) 31 GAUGE X 5/16" NDLE    1 each by Misc.(Non-Drug; Combo Route) route 5 (five) times daily.   Changed and/or Refilled Medications    Modified Medication Previous Medication    ASPIRIN 81 MG CHEW aspirin 81 MG Chew       Take 1 tablet (81 mg total) by mouth once daily.    CHEW AND SWALLOW 1 TABLET BY MOUTH ONCE DAILY    FUROSEMIDE (LASIX) 40 MG TABLET furosemide (LASIX) 40 MG tablet       Please take 40 and 80 mg alternatingly    Take 0.5 tablets (20 mg total) by mouth once daily.   Discontinued Medications    DICLOFENAC SODIUM (SOLARAZE) 3 % GEL    Apply topically 2 (two) times daily.    HYDRALAZINE (APRESOLINE) 100 MG TABLET    Take 100 mg by mouth 2 (two) times daily.        "

## 2023-06-27 LAB
LEFT EYE DM RETINOPATHY: POSITIVE
RIGHT EYE DM RETINOPATHY: POSITIVE

## 2023-06-28 ENCOUNTER — TELEPHONE (OUTPATIENT)
Dept: PRIMARY CARE CLINIC | Facility: CLINIC | Age: 63
End: 2023-06-28

## 2023-06-28 ENCOUNTER — OFFICE VISIT (OUTPATIENT)
Dept: PRIMARY CARE CLINIC | Facility: CLINIC | Age: 63
End: 2023-06-28
Payer: MEDICARE

## 2023-06-28 VITALS
BODY MASS INDEX: 29.11 KG/M2 | HEIGHT: 59 IN | RESPIRATION RATE: 16 BRPM | DIASTOLIC BLOOD PRESSURE: 67 MMHG | TEMPERATURE: 97 F | SYSTOLIC BLOOD PRESSURE: 172 MMHG | OXYGEN SATURATION: 99 % | WEIGHT: 144.38 LBS | HEART RATE: 58 BPM

## 2023-06-28 DIAGNOSIS — Z79.4 TYPE 2 DIABETES MELLITUS WITH DIABETIC POLYNEUROPATHY, WITH LONG-TERM CURRENT USE OF INSULIN: ICD-10-CM

## 2023-06-28 DIAGNOSIS — R60.0 LOCALIZED EDEMA: ICD-10-CM

## 2023-06-28 DIAGNOSIS — E11.42 TYPE 2 DIABETES MELLITUS WITH DIABETIC POLYNEUROPATHY, WITH LONG-TERM CURRENT USE OF INSULIN: ICD-10-CM

## 2023-06-28 DIAGNOSIS — E11.42 TYPE 2 DIABETES MELLITUS WITH DIABETIC POLYNEUROPATHY, WITHOUT LONG-TERM CURRENT USE OF INSULIN: Primary | ICD-10-CM

## 2023-06-28 DIAGNOSIS — I10 BENIGN ESSENTIAL HTN: ICD-10-CM

## 2023-06-28 PROCEDURE — 99214 PR OFFICE/OUTPT VISIT, EST, LEVL IV, 30-39 MIN: ICD-10-PCS | Mod: S$GLB,,, | Performed by: INTERNAL MEDICINE

## 2023-06-28 PROCEDURE — 99214 OFFICE O/P EST MOD 30 MIN: CPT | Mod: S$GLB,,, | Performed by: INTERNAL MEDICINE

## 2023-06-28 RX ORDER — SPIRONOLACTONE 50 MG/1
50 TABLET, FILM COATED ORAL DAILY
Qty: 30 TABLET | Refills: 11 | Status: CANCELLED | OUTPATIENT
Start: 2023-06-28 | End: 2024-06-27

## 2023-06-28 RX ORDER — INSULIN GLARGINE 100 [IU]/ML
14 INJECTION, SOLUTION SUBCUTANEOUS DAILY
Qty: 12.6 ML | Refills: 3 | Status: SHIPPED | OUTPATIENT
Start: 2023-06-28 | End: 2023-11-13

## 2023-06-28 RX ORDER — AMLODIPINE BESYLATE 5 MG/1
5 TABLET ORAL DAILY
Qty: 30 TABLET | Refills: 11 | Status: SHIPPED | OUTPATIENT
Start: 2023-06-28 | End: 2024-01-05

## 2023-06-28 NOTE — PROGRESS NOTES
Subjective:      Patient ID: Radha Hoffman is a 63 y.o. female.    Chief Complaint: Follow-up     Diabetes with last A1c of 6.3 seems under good control.  Patient has CGM and the data is downloaded.  Data suggest that patient average blood sugar is 173 with standard deviation of 53.  Patient blood sugars are in range 58% of the time running high 33% of the time and very high 9% of the time.  Patient does not have any hypoglycemic episodes.  Patient is taking Lantus 14 units daily and 5 units of Humalog before each meal.  Patient was recently evaluated by ophthalmologist and reports she has diabetic retinopathy.    Angiogram was done by Dr. Barrios, that showed moderate 3 vessel coronary artery disease.  No stents were placed and medical management was recommended.  Patient is on aspirin, beta blocker, ARB, statin.  Patient is also on Lasix 40 mg once a day.  Echocardiogram is scheduled for September.  Patient previously had bilateral ankle swelling.  Patient is also on metolazone twice a week.  Patient denies any shortness a breath, PND, orthopnea.  Patient reports home blood pressures are fluctuating a lot 110//110.       Review of Systems   Constitutional:  Negative for chills, diaphoresis, fever, malaise/fatigue and weight loss.   HENT:  Negative for congestion, ear pain, sinus pain, sore throat and tinnitus.    Eyes:  Negative for blurred vision and photophobia.   Respiratory:  Negative for cough, hemoptysis, shortness of breath and wheezing.    Cardiovascular:  Negative for chest pain, palpitations, orthopnea, leg swelling and PND.   Gastrointestinal:  Negative for abdominal pain, blood in stool, constipation, diarrhea, heartburn, melena, nausea and vomiting.   Genitourinary:  Negative for dysuria, frequency and urgency.   Musculoskeletal:  Negative for back pain, myalgias and neck pain.   Skin:  Negative for rash.   Neurological:  Negative for dizziness, tremors, seizures, loss of consciousness  "and weakness.   Endo/Heme/Allergies:  Negative for polydipsia.   Psychiatric/Behavioral:  Negative for depression and hallucinations. The patient does not have insomnia.      Objective:   BP (!) 172/67 (BP Location: Right arm, Patient Position: Sitting, BP Method: Large (Automatic))   Pulse (!) 58   Temp 97.1 °F (36.2 °C) (Temporal)   Resp 16   Ht 4' 11" (1.499 m)   Wt 65.5 kg (144 lb 6.4 oz)   SpO2 99%   BMI 29.17 kg/m²     Physical Exam  Constitutional:       General: She is not in acute distress.     Appearance: She is not diaphoretic.   Neck:      Thyroid: No thyromegaly.   Cardiovascular:      Rate and Rhythm: Normal rate and regular rhythm.      Heart sounds: Normal heart sounds. No murmur heard.  Pulmonary:      Effort: Pulmonary effort is normal. No respiratory distress.      Breath sounds: Normal breath sounds. No wheezing.   Abdominal:      General: Bowel sounds are normal. There is no distension.      Palpations: Abdomen is soft.      Tenderness: There is no abdominal tenderness.   Neurological:      Mental Status: She is alert and oriented to person, place, and time.   Psychiatric:         Behavior: Behavior normal.         Thought Content: Thought content normal.         Judgment: Judgment normal.     Assessment:       ICD-10-CM ICD-9-CM   1. Type 2 diabetes mellitus with diabetic polyneuropathy, without long-term current use of insulin  E11.42 250.60     357.2   2. Benign essential HTN  I10 401.1   3. Localized edema  R60.0 782.3       Plan:     Last A1c of 6.3 is at goal.  Patient has CGM and there is no hypoglycemic episode noted.  Will continue same dose of insulin at this time.  Patient has hypertension and blood pressure does not seem under good control.  Will add amlodipine at bedtime.  Advised patient to monitor blood pressures at home and bring log of blood pressure.  Patient has localized edema can be secondary to CHF.  Patient is on diuretics and has an appointment for echocardiogram " in few months.  Patient is also being followed by cardiologist.  Advised patient to keep appointment  Patient is moving very slowly and does not seem very steady on his gait.  Patient was undergoing physical therapy with some help.  Advised patient to make appointment with physical therapy again  If needed will send referral again    Medication List with Changes/Refills   New Medications    AMLODIPINE (NORVASC) 5 MG TABLET    Take 1 tablet (5 mg total) by mouth once daily.   Current Medications    ACETAMINOPHEN (TYLENOL) 500 MG TABLET    Take 500 mg by mouth every 6 (six) hours as needed for Pain.    ASPIRIN 81 MG CHEW    Take 1 tablet (81 mg total) by mouth once daily.    ATORVASTATIN (LIPITOR) 40 MG TABLET    Take 1 tablet (40 mg total) by mouth once daily.    BLOOD SUGAR DIAGNOSTIC (ONETOUCH ULTRA TEST) [facility]    CHECK BLOOD GLUCOSE 3 TIMES A DAY    CEPHALEXIN (KEFLEX) 250 MG CAPSULE    Take 1 capsule (250 mg total) by mouth Daily.    DULOXETINE (CYMBALTA) 30 MG CAPSULE    Take 30 mg by mouth once daily.    ESTRADIOL (ESTRACE) 0.01 % (0.1 MG/GRAM) VAGINAL CREAM    PLACE 1 GRAM VAGINALLY EVERY MONDAY, WEDNESDAY, FRIDAY    FUROSEMIDE (LASIX) 40 MG TABLET    Please take 40 and 80 mg alternatingly    GABAPENTIN (NEURONTIN) 300 MG CAPSULE    TAKE 1 CAPSULE BY MOUTH THREE TIMES DAILY    HUMALOG KWIKPEN INSULIN 100 UNIT/ML PEN    Inject 5 Units into the skin 3 (three) times daily before meals. 8 units before each meal.    HYDRALAZINE (APRESOLINE) 50 MG TABLET    Take 1 tablet (50 mg total) by mouth every 12 (twelve) hours.    INSULIN (LANTUS SOLOSTAR U-100 INSULIN) GLARGINE 100 UNITS/ML SUBQ PEN    Inject 20 Units into the skin 2 (two) times a day.    LACTOBACILLUS RHAMNOSUS-FIBER ORAL    Take 1 capsule by mouth Daily.    LANCETS 32 GAUGE MISC    1 lancet by Misc.(Non-Drug; Combo Route) route 3 (three) times daily.    LOSARTAN (COZAAR) 100 MG TABLET    TAKE 1 TABLET BY MOUTH ONCE DAILY FOR 30 DAYS    METOLAZONE  "(ZAROXOLYN) 2.5 MG TABLET    TAKE 1 TABLET BY MOUTH EVERY OTHER DAY FOR 5 DOSES    METOPROLOL TARTRATE (LOPRESSOR) 50 MG TABLET    Take 50 mg by mouth 2 (two) times daily.    MIRTAZAPINE (REMERON) 15 MG TABLET    Take 15 mg by mouth every evening.    MONTELUKAST (SINGULAIR) 10 MG TABLET    TAKE 1 TABLET BY MOUTH ONCE DAILY FOR 30 DAYS    OMEPRAZOLE (PRILOSEC) 40 MG CAPSULE    Take 40 mg by mouth once daily.    PEN NEEDLE, DIABETIC (BD ULTRA-FINE SHORT PEN NEEDLE) 31 GAUGE X 5/16" NDLE    1 each by Misc.(Non-Drug; Combo Route) route 5 (five) times daily.        "

## 2023-06-29 ENCOUNTER — PATIENT OUTREACH (OUTPATIENT)
Dept: ADMINISTRATIVE | Facility: HOSPITAL | Age: 63
End: 2023-06-29
Payer: MEDICARE

## 2023-06-29 RX ORDER — INSULIN ASPART 100 [IU]/ML
5 INJECTION, SOLUTION INTRAVENOUS; SUBCUTANEOUS
Qty: 15 ML | Refills: 3 | Status: SHIPPED | OUTPATIENT
Start: 2023-06-29 | End: 2024-06-28

## 2023-06-29 NOTE — PROGRESS NOTES
Uploading and Hyperlinking Mammogram done 1.3.2023 and Provider f/u documentation which was completed on 1.30.2023.

## 2023-07-10 DIAGNOSIS — E11.42 TYPE 2 DIABETES MELLITUS WITH DIABETIC POLYNEUROPATHY, WITH LONG-TERM CURRENT USE OF INSULIN: ICD-10-CM

## 2023-07-10 DIAGNOSIS — Z79.4 TYPE 2 DIABETES MELLITUS WITH DIABETIC POLYNEUROPATHY, WITH LONG-TERM CURRENT USE OF INSULIN: ICD-10-CM

## 2023-07-10 RX ORDER — GABAPENTIN 300 MG/1
CAPSULE ORAL
Qty: 90 CAPSULE | Refills: 0 | Status: SHIPPED | OUTPATIENT
Start: 2023-07-10 | End: 2023-07-31

## 2023-07-31 DIAGNOSIS — Z79.4 TYPE 2 DIABETES MELLITUS WITH DIABETIC POLYNEUROPATHY, WITH LONG-TERM CURRENT USE OF INSULIN: ICD-10-CM

## 2023-07-31 DIAGNOSIS — E11.42 TYPE 2 DIABETES MELLITUS WITH DIABETIC POLYNEUROPATHY, WITH LONG-TERM CURRENT USE OF INSULIN: ICD-10-CM

## 2023-07-31 RX ORDER — GABAPENTIN 300 MG/1
CAPSULE ORAL
Qty: 90 CAPSULE | Refills: 0 | Status: SHIPPED | OUTPATIENT
Start: 2023-07-31 | End: 2023-08-28

## 2023-08-01 ENCOUNTER — OFFICE VISIT (OUTPATIENT)
Dept: PRIMARY CARE CLINIC | Facility: CLINIC | Age: 63
End: 2023-08-01
Payer: MEDICARE

## 2023-08-01 VITALS
TEMPERATURE: 97 F | HEART RATE: 65 BPM | RESPIRATION RATE: 16 BRPM | BODY MASS INDEX: 29.19 KG/M2 | SYSTOLIC BLOOD PRESSURE: 142 MMHG | DIASTOLIC BLOOD PRESSURE: 71 MMHG | HEIGHT: 59 IN | WEIGHT: 144.81 LBS | OXYGEN SATURATION: 96 %

## 2023-08-01 DIAGNOSIS — N18.32 STAGE 3B CHRONIC KIDNEY DISEASE: ICD-10-CM

## 2023-08-01 DIAGNOSIS — K21.9 GASTROESOPHAGEAL REFLUX DISEASE WITHOUT ESOPHAGITIS: ICD-10-CM

## 2023-08-01 DIAGNOSIS — E11.3393 MODERATE NONPROLIFERATIVE DIABETIC RETINOPATHY OF BOTH EYES WITHOUT MACULAR EDEMA ASSOCIATED WITH TYPE 2 DIABETES MELLITUS: ICD-10-CM

## 2023-08-01 DIAGNOSIS — E11.42 TYPE 2 DIABETES MELLITUS WITH DIABETIC POLYNEUROPATHY, WITHOUT LONG-TERM CURRENT USE OF INSULIN: Primary | ICD-10-CM

## 2023-08-01 PROCEDURE — 99214 OFFICE O/P EST MOD 30 MIN: CPT | Mod: 25,S$GLB,, | Performed by: INTERNAL MEDICINE

## 2023-08-01 PROCEDURE — 95251 CONT GLUC MNTR ANALYSIS I&R: CPT | Mod: S$GLB,,, | Performed by: INTERNAL MEDICINE

## 2023-08-01 PROCEDURE — 95251 PR GLUCOSE MONITOR, 72 HOUR, PHYS INTERP: ICD-10-PCS | Mod: S$GLB,,, | Performed by: INTERNAL MEDICINE

## 2023-08-01 PROCEDURE — 99214 PR OFFICE/OUTPT VISIT, EST, LEVL IV, 30-39 MIN: ICD-10-PCS | Mod: 25,S$GLB,, | Performed by: INTERNAL MEDICINE

## 2023-08-01 RX ORDER — OMEPRAZOLE 40 MG/1
40 CAPSULE, DELAYED RELEASE ORAL DAILY
Qty: 90 CAPSULE | Refills: 3 | Status: SHIPPED | OUTPATIENT
Start: 2023-08-01 | End: 2024-07-31

## 2023-08-01 NOTE — PROGRESS NOTES
Subjective:      Patient ID: Radha Hoffman is a 63 y.o. female.    Chief Complaint: Diabetes (1month f/u )    :  Patient with diabetes with last A1c of 7.3 worsen from 6.3.  Patient was having multiple hypoglycemic episode previously that have improved.  Patient has CGM placed and the data is downloaded.  Data suggest that patient average blood sugar is running 154 with standard deviation of 45.  Patient blood sugars are running in good range 72% of the time running high 25% of the time and very high 2% of the time.  Patient denies any hypoglycemic episodes.  Patient is taking Lantus 14 units and Humalog 6 units before each meal.    Blood pressures are better controlled blood pressures in nephrologist office for running on lower side and the medication was adjusted.  Patient also reports worsening edema for which metolazone was added with much help.  3 vessel coronary artery disease.  No stents were placed and medical management was recommended.  Patient is being followed by Dr. Barrios.  Patient denies any PND orthopnea, shortness of breath.    Review of Systems   Constitutional:  Negative for chills, diaphoresis, fever, malaise/fatigue and weight loss.   HENT:  Negative for congestion, ear pain, sinus pain, sore throat and tinnitus.    Eyes:  Negative for blurred vision and photophobia.   Respiratory:  Negative for cough, hemoptysis, shortness of breath and wheezing.    Cardiovascular:  Positive for leg swelling. Negative for chest pain, palpitations, orthopnea and PND.   Gastrointestinal:  Negative for abdominal pain, blood in stool, constipation, diarrhea, heartburn, melena, nausea and vomiting.   Genitourinary:  Negative for dysuria, frequency and urgency.   Musculoskeletal:  Negative for back pain, myalgias and neck pain.   Skin:  Negative for rash.   Neurological:  Negative for dizziness, tremors, seizures, loss of consciousness and weakness.   Endo/Heme/Allergies:  Negative for polydipsia.  "  Psychiatric/Behavioral:  Negative for depression and hallucinations. The patient does not have insomnia.      Objective:   BP (!) 142/71 (BP Location: Right arm, Patient Position: Sitting, BP Method: Medium (Automatic))   Pulse 65   Temp 97.2 °F (36.2 °C) (Temporal)   Resp 16   Ht 4' 11" (1.499 m)   Wt 65.7 kg (144 lb 12.8 oz)   SpO2 96%   BMI 29.25 kg/m²     Physical Exam:  Patient not examined  Assessment:       ICD-10-CM ICD-9-CM   1. Type 2 diabetes mellitus with diabetic polyneuropathy, without long-term current use of insulin  E11.42 250.60     357.2   2. Gastroesophageal reflux disease without esophagitis  K21.9 530.81   3. Stage 3b chronic kidney disease  N18.32 585.3   4. Moderate nonproliferative diabetic retinopathy of both eyes without macular edema associated with type 2 diabetes mellitus  E11.3393 250.50     362.05       Plan:     Patient has diabetes with last A1c of 7.3 is slightly high  Patient denies any more hypoglycemic episodes  Patient home blood sugar data suggest blood sugars are much better controlled  Will continue same dose of medication  Patient has pedal edema is taking Lasix and metolazone with much help  Will continue same medication for now  Advised patient to minimize fluid consumption  Patient has chronic kidney disease and is being followed by nephrologist Dr. Florez  Blood pressures were running low in nephrologist office.  Blood pressure in my office are slightly high  Home blood pressures are better controlled.  Will not change medication at this time  Patient has been evaluated by ophthalmologist and reported diabetic retinopathy.  Better blood sugar control is recommended  Advised patient to keep appointment with ophthalmologist  Will repeat annual labs    Medication List with Changes/Refills   Current Medications    ACETAMINOPHEN (TYLENOL) 500 MG TABLET    Take 500 mg by mouth every 6 (six) hours as needed for Pain.    AMLODIPINE (NORVASC) 5 MG TABLET    Take 1 tablet " "(5 mg total) by mouth once daily.    ASPIRIN 81 MG CHEW    Take 1 tablet (81 mg total) by mouth once daily.    ATORVASTATIN (LIPITOR) 40 MG TABLET    Take 1 tablet (40 mg total) by mouth once daily.    BLOOD SUGAR DIAGNOSTIC (ONETOUCH ULTRA TEST) [facility]    CHECK BLOOD GLUCOSE 3 TIMES A DAY    CEPHALEXIN (KEFLEX) 250 MG CAPSULE    Take 1 capsule (250 mg total) by mouth Daily.    DULOXETINE (CYMBALTA) 30 MG CAPSULE    Take 30 mg by mouth once daily.    FUROSEMIDE (LASIX) 40 MG TABLET    Please take 40 and 80 mg alternatingly    GABAPENTIN (NEURONTIN) 300 MG CAPSULE    TAKE 1 CAPSULE BY MOUTH THREE TIMES DAILY    HYDRALAZINE (APRESOLINE) 50 MG TABLET    Take 1 tablet (50 mg total) by mouth every 12 (twelve) hours.    INSULIN (LANTUS SOLOSTAR U-100 INSULIN) GLARGINE 100 UNITS/ML SUBQ PEN    Inject 14 Units into the skin once daily.    INSULIN ASPART U-100 (NOVOLOG FLEXPEN U-100 INSULIN) 100 UNIT/ML (3 ML) INPN PEN    Inject 5 Units into the skin 3 (three) times daily with meals.    LANCETS 32 GAUGE MISC    1 lancet by Misc.(Non-Drug; Combo Route) route 3 (three) times daily.    LOSARTAN (COZAAR) 100 MG TABLET    TAKE 1 TABLET BY MOUTH ONCE DAILY FOR 30 DAYS    METOLAZONE (ZAROXOLYN) 2.5 MG TABLET    TAKE 1 TABLET BY MOUTH EVERY OTHER DAY FOR 5 DOSES    METOPROLOL TARTRATE (LOPRESSOR) 50 MG TABLET    Take 50 mg by mouth 2 (two) times daily.    MIRTAZAPINE (REMERON) 15 MG TABLET    Take 15 mg by mouth every evening.    MONTELUKAST (SINGULAIR) 10 MG TABLET    TAKE 1 TABLET BY MOUTH ONCE DAILY FOR 30 DAYS    PEN NEEDLE, DIABETIC (BD ULTRA-FINE SHORT PEN NEEDLE) 31 GAUGE X 5/16" NDLE    1 each by Misc.(Non-Drug; Combo Route) route 5 (five) times daily.   Changed and/or Refilled Medications    Modified Medication Previous Medication    OMEPRAZOLE (PRILOSEC) 40 MG CAPSULE omeprazole (PRILOSEC) 40 MG capsule       Take 1 capsule (40 mg total) by mouth once daily.    Take 40 mg by mouth once daily.   Discontinued Medications    " ESTRADIOL (ESTRACE) 0.01 % (0.1 MG/GRAM) VAGINAL CREAM    PLACE 1 GRAM VAGINALLY EVERY MONDAY, WEDNESDAY, FRIDAY    LACTOBACILLUS RHAMNOSUS-FIBER ORAL    Take 1 capsule by mouth Daily.

## 2023-08-17 ENCOUNTER — OFFICE VISIT (OUTPATIENT)
Dept: UROLOGY | Facility: CLINIC | Age: 63
End: 2023-08-17
Payer: MEDICARE

## 2023-08-17 VITALS — HEIGHT: 59 IN | WEIGHT: 144 LBS | BODY MASS INDEX: 29.03 KG/M2 | RESPIRATION RATE: 18 BRPM

## 2023-08-17 DIAGNOSIS — R82.90 ABNORMAL URINALYSIS: ICD-10-CM

## 2023-08-17 DIAGNOSIS — R33.9 URINARY RETENTION: Primary | ICD-10-CM

## 2023-08-17 LAB
BILIRUBIN, UA POC OHS: NEGATIVE
BLOOD, UA POC OHS: NEGATIVE
CLARITY, UA POC OHS: ABNORMAL
COLOR, UA POC OHS: YELLOW
GLUCOSE, UA POC OHS: NEGATIVE
KETONES, UA POC OHS: NEGATIVE
LEUKOCYTES, UA POC OHS: ABNORMAL
NITRITE, UA POC OHS: NEGATIVE
PH, UA POC OHS: 5
PROTEIN, UA POC OHS: ABNORMAL
SPECIFIC GRAVITY, UA POC OHS: 1.01
UROBILINOGEN, UA POC OHS: 0.2

## 2023-08-17 PROCEDURE — 99214 OFFICE O/P EST MOD 30 MIN: CPT | Mod: S$GLB,,, | Performed by: NURSE PRACTITIONER

## 2023-08-17 PROCEDURE — 99214 PR OFFICE/OUTPT VISIT, EST, LEVL IV, 30-39 MIN: ICD-10-PCS | Mod: S$GLB,,, | Performed by: NURSE PRACTITIONER

## 2023-08-17 PROCEDURE — 81003 POCT URINALYSIS(INSTRUMENT): ICD-10-PCS | Mod: QW,S$GLB,, | Performed by: NURSE PRACTITIONER

## 2023-08-17 PROCEDURE — 81003 URINALYSIS AUTO W/O SCOPE: CPT | Mod: QW,S$GLB,, | Performed by: NURSE PRACTITIONER

## 2023-08-17 NOTE — PROGRESS NOTES
Subjective:       Patient ID: Radha Hoffman is a 63 y.o. female.    Chief Complaint: 3 month f/u      HPI: 63-year-old female known to service history recurring UTI.  She is done well on suppression antibiotic therapies Keflex 250 mg daily.  Today she feels well she is not had any recurrence symptoms.  This is a three-month follow-up she has no other urologic concerns or complaints today.         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 Abdalla    Stomach ulcer     Urinary retention     Urinary tract infection        Past Surgical Historical:   Past Surgical History:   Procedure Laterality Date    CATARACT EXTRACTION, BILATERAL      Catherization of heart      endo scopy      HYSTERECTOMY          Medications:   Medication List with Changes/Refills   Current Medications    ACETAMINOPHEN (TYLENOL) 500 MG TABLET    Take 500 mg by mouth every 6 (six) hours as needed for Pain.    AMLODIPINE (NORVASC) 5 MG TABLET    Take 1 tablet (5 mg total) by mouth once daily.    ASPIRIN 81 MG CHEW    Take 1 tablet (81 mg total) by mouth once daily.    ATORVASTATIN (LIPITOR) 40 MG TABLET    Take 1 tablet (40 mg total) by mouth once daily.    BLOOD SUGAR DIAGNOSTIC (ONETOUCH ULTRA TEST) [facility]P    CHECK BLOOD GLUCOSE 3 TIMES A DAY    CEPHALEXIN (KEFLEX) 250 MG CAPSULE    Take 1 capsule (250 mg total) by mouth Daily.    DULOXETINE (CYMBALTA) 30 MG CAPSULE    Take 30 mg by mouth once daily.    FUROSEMIDE (LASIX) 40 MG TABLET    Please take 40 and 80 mg alternatingly    GABAPENTIN (NEURONTIN) 300 MG CAPSULE    TAKE 1  "CAPSULE BY MOUTH THREE TIMES DAILY    HYDRALAZINE (APRESOLINE) 50 MG TABLET    Take 1 tablet (50 mg total) by mouth every 12 (twelve) hours.    INSULIN (LANTUS SOLOSTAR U-100 INSULIN) GLARGINE 100 UNITS/ML SUBQ PEN    Inject 14 Units into the skin once daily.    INSULIN ASPART U-100 (NOVOLOG FLEXPEN U-100 INSULIN) 100 UNIT/ML (3 ML) INPN PEN    Inject 5 Units into the skin 3 (three) times daily with meals.    LANCETS 32 GAUGE MISC    1 lancet by Misc.(Non-Drug; Combo Route) route 3 (three) times daily.    LOSARTAN (COZAAR) 100 MG TABLET    TAKE 1 TABLET BY MOUTH ONCE DAILY FOR 30 DAYS    METOLAZONE (ZAROXOLYN) 2.5 MG TABLET    TAKE 1 TABLET BY MOUTH EVERY OTHER DAY FOR 5 DOSES    METOPROLOL TARTRATE (LOPRESSOR) 50 MG TABLET    Take 50 mg by mouth 2 (two) times daily.    MIRTAZAPINE (REMERON) 15 MG TABLET    Take 15 mg by mouth every evening.    MONTELUKAST (SINGULAIR) 10 MG TABLET    TAKE 1 TABLET BY MOUTH ONCE DAILY FOR 30 DAYS    OMEPRAZOLE (PRILOSEC) 40 MG CAPSULE    Take 1 capsule (40 mg total) by mouth once daily.    PEN NEEDLE, DIABETIC (BD ULTRA-FINE SHORT PEN NEEDLE) 31 GAUGE X 5/16" NDLE    1 each by Misc.(Non-Drug; Combo Route) route 5 (five) times daily.        Past Social History:   Social History     Socioeconomic History    Marital status: Single    Number of children: 3   Occupational History    Occupation: nurses aide   Tobacco Use    Smoking status: Never    Smokeless tobacco: Never   Substance and Sexual Activity    Alcohol use: No    Drug use: No    Sexual activity: Never     Social Determinants of Health     Financial Resource Strain: Low Risk  (3/11/2019)    Overall Financial Resource Strain (CARDIA)     Difficulty of Paying Living Expenses: Not hard at all   Food Insecurity: No Food Insecurity (3/11/2019)    Hunger Vital Sign     Worried About Running Out of Food in the Last Year: Never true     Ran Out of Food in the Last Year: Never true   Transportation Needs: No Transportation Needs " (3/11/2019)    PRAPARE - Transportation     Lack of Transportation (Medical): No     Lack of Transportation (Non-Medical): No   Physical Activity: Insufficiently Active (3/11/2019)    Exercise Vital Sign     Days of Exercise per Week: 1 day     Minutes of Exercise per Session: 60 min   Stress: No Stress Concern Present (3/11/2019)    [employer] - Occupational Stress Questionnaire     Feeling of Stress : Not at all   Social Connections: Unknown (3/11/2019)    Social Connection and Isolation Panel [NHANES]     Frequency of Communication with Friends and Family: More than three times a week     Frequency of Social Gatherings with Friends and Family: More than three times a week     Attends L[employer] Services: More than 4 times per year     Active Member of Clubs or Organizations: No     Attends Club or Organization Meetings: Never       Allergies:   Review of patient's allergies indicates:   Allergen Reactions    Metformin     Phenazopyridine     Sulfa (sulfonamide antibiotics)         Family History:   Family History   Problem Relation Age of Onset    Arthritis Mother     Depression Mother     Diabetes Mother     Heart disease Mother     Hypertension Mother     Arthritis Father     Depression Father     Arthritis Sister     Diabetes Sister     Arthritis Brother         Review of Systems:  Review of Systems   Constitutional:  Negative for activity change and appetite change.   HENT:  Negative for congestion and dental problem.    Respiratory:  Negative for chest tightness and shortness of breath.    Cardiovascular:  Negative for chest pain.   Gastrointestinal:  Negative for abdominal distention and abdominal pain.   Genitourinary:  Negative for decreased urine volume, difficulty urinating, dyspareunia, dysuria, enuresis, flank pain, frequency, genital sores, hematuria, pelvic pain and urgency.   Musculoskeletal:  Negative for back pain and neck pain.   Allergic/Immunologic: Negative for  immunocompromised state.   Neurological:  Negative for dizziness.   Hematological:  Negative for adenopathy.   Psychiatric/Behavioral:  Negative for agitation, behavioral problems and confusion.        Physical Exam:  Physical Exam  Constitutional:       Appearance: She is well-developed.   HENT:      Head: Normocephalic and atraumatic.   Eyes:      General: No scleral icterus.  Pulmonary:      Effort: Pulmonary effort is normal.      Breath sounds: Normal breath sounds.   Abdominal:      General: There is no distension.      Palpations: Abdomen is soft.      Tenderness: There is no abdominal tenderness.   Genitourinary:     Exam position: Supine.      Labia:         Right: No rash, tenderness or lesion.         Left: No rash, tenderness or lesion.       Vagina: Normal.      Rectum: Normal.   Musculoskeletal:      Cervical back: Normal range of motion.   Skin:     General: Skin is warm and dry.   Neurological:      Mental Status: She is alert and oriented to person, place, and time.         Assessment/Plan:   History recurrent UTIs on suppression therapy.  Asymptomatic today.  Urinalysis WBC 15 202 red cells 1+ epi 2+ bacteria nitrite negative.  Problem List Items Addressed This Visit    None

## 2023-08-28 DIAGNOSIS — Z79.4 TYPE 2 DIABETES MELLITUS WITH DIABETIC POLYNEUROPATHY, WITH LONG-TERM CURRENT USE OF INSULIN: ICD-10-CM

## 2023-08-28 DIAGNOSIS — E11.42 TYPE 2 DIABETES MELLITUS WITH DIABETIC POLYNEUROPATHY, WITH LONG-TERM CURRENT USE OF INSULIN: ICD-10-CM

## 2023-08-28 RX ORDER — GABAPENTIN 300 MG/1
CAPSULE ORAL
Qty: 90 CAPSULE | Refills: 0 | Status: SHIPPED | OUTPATIENT
Start: 2023-08-28 | End: 2023-10-03

## 2023-09-12 ENCOUNTER — OFFICE VISIT (OUTPATIENT)
Dept: PRIMARY CARE CLINIC | Facility: CLINIC | Age: 63
End: 2023-09-12
Payer: MEDICARE

## 2023-09-12 VITALS
RESPIRATION RATE: 16 BRPM | BODY MASS INDEX: 28.63 KG/M2 | WEIGHT: 142 LBS | SYSTOLIC BLOOD PRESSURE: 123 MMHG | OXYGEN SATURATION: 99 % | TEMPERATURE: 97 F | HEART RATE: 57 BPM | DIASTOLIC BLOOD PRESSURE: 60 MMHG | HEIGHT: 59 IN

## 2023-09-12 DIAGNOSIS — E11.42 TYPE 2 DIABETES MELLITUS WITH DIABETIC POLYNEUROPATHY, WITHOUT LONG-TERM CURRENT USE OF INSULIN: ICD-10-CM

## 2023-09-12 DIAGNOSIS — R06.02 SHORTNESS OF BREATH: Primary | ICD-10-CM

## 2023-09-12 PROCEDURE — 1160F RVW MEDS BY RX/DR IN RCRD: CPT | Mod: CPTII,S$GLB,, | Performed by: INTERNAL MEDICINE

## 2023-09-12 PROCEDURE — 1160F PR REVIEW ALL MEDS BY PRESCRIBER/CLIN PHARMACIST DOCUMENTED: ICD-10-PCS | Mod: CPTII,S$GLB,, | Performed by: INTERNAL MEDICINE

## 2023-09-12 PROCEDURE — 99214 OFFICE O/P EST MOD 30 MIN: CPT | Mod: 25,S$GLB,, | Performed by: INTERNAL MEDICINE

## 2023-09-12 PROCEDURE — 3008F PR BODY MASS INDEX (BMI) DOCUMENTED: ICD-10-PCS | Mod: CPTII,S$GLB,, | Performed by: INTERNAL MEDICINE

## 2023-09-12 PROCEDURE — 3078F DIAST BP <80 MM HG: CPT | Mod: CPTII,S$GLB,, | Performed by: INTERNAL MEDICINE

## 2023-09-12 PROCEDURE — 4010F ACE/ARB THERAPY RXD/TAKEN: CPT | Mod: CPTII,S$GLB,, | Performed by: INTERNAL MEDICINE

## 2023-09-12 PROCEDURE — 3044F HG A1C LEVEL LT 7.0%: CPT | Mod: CPTII,S$GLB,, | Performed by: INTERNAL MEDICINE

## 2023-09-12 PROCEDURE — 1157F PR ADVANCE CARE PLAN OR EQUIV PRESENT IN MEDICAL RECORD: ICD-10-PCS | Mod: CPTII,S$GLB,, | Performed by: INTERNAL MEDICINE

## 2023-09-12 PROCEDURE — 3078F PR MOST RECENT DIASTOLIC BLOOD PRESSURE < 80 MM HG: ICD-10-PCS | Mod: CPTII,S$GLB,, | Performed by: INTERNAL MEDICINE

## 2023-09-12 PROCEDURE — 95251 PR GLUCOSE MONITOR, 72 HOUR, PHYS INTERP: ICD-10-PCS | Mod: S$GLB,,, | Performed by: INTERNAL MEDICINE

## 2023-09-12 PROCEDURE — 1159F MED LIST DOCD IN RCRD: CPT | Mod: CPTII,S$GLB,, | Performed by: INTERNAL MEDICINE

## 2023-09-12 PROCEDURE — 1157F ADVNC CARE PLAN IN RCRD: CPT | Mod: CPTII,S$GLB,, | Performed by: INTERNAL MEDICINE

## 2023-09-12 PROCEDURE — 3044F PR MOST RECENT HEMOGLOBIN A1C LEVEL <7.0%: ICD-10-PCS | Mod: CPTII,S$GLB,, | Performed by: INTERNAL MEDICINE

## 2023-09-12 PROCEDURE — 1159F PR MEDICATION LIST DOCUMENTED IN MEDICAL RECORD: ICD-10-PCS | Mod: CPTII,S$GLB,, | Performed by: INTERNAL MEDICINE

## 2023-09-12 PROCEDURE — 3008F BODY MASS INDEX DOCD: CPT | Mod: CPTII,S$GLB,, | Performed by: INTERNAL MEDICINE

## 2023-09-12 PROCEDURE — 95251 CONT GLUC MNTR ANALYSIS I&R: CPT | Mod: S$GLB,,, | Performed by: INTERNAL MEDICINE

## 2023-09-12 PROCEDURE — 4010F PR ACE/ARB THEARPY RXD/TAKEN: ICD-10-PCS | Mod: CPTII,S$GLB,, | Performed by: INTERNAL MEDICINE

## 2023-09-12 PROCEDURE — 99214 PR OFFICE/OUTPT VISIT, EST, LEVL IV, 30-39 MIN: ICD-10-PCS | Mod: 25,S$GLB,, | Performed by: INTERNAL MEDICINE

## 2023-09-12 PROCEDURE — 3074F PR MOST RECENT SYSTOLIC BLOOD PRESSURE < 130 MM HG: ICD-10-PCS | Mod: CPTII,S$GLB,, | Performed by: INTERNAL MEDICINE

## 2023-09-12 PROCEDURE — 3074F SYST BP LT 130 MM HG: CPT | Mod: CPTII,S$GLB,, | Performed by: INTERNAL MEDICINE

## 2023-09-12 RX ORDER — DULOXETIN HYDROCHLORIDE 60 MG/1
60 CAPSULE, DELAYED RELEASE ORAL ONCE
COMMUNITY
Start: 2023-08-22

## 2023-09-12 RX ORDER — BUSPIRONE HYDROCHLORIDE 10 MG/1
10 TABLET ORAL 2 TIMES DAILY
COMMUNITY
Start: 2023-08-22 | End: 2023-11-08

## 2023-09-12 NOTE — PROGRESS NOTES
Subjective:      Patient ID: Radha Hoffman is a 63 y.o. female.    Chief Complaint: Diabetes (Pt c/o BS has been dropping in the morning and at night)    HPI:  Patient with diabetes with last A1c of 7.3 worsen from 6.3.  Patient has CGM placed and the data is downloaded.  Patient average blood sugar is running 126 with standard deviation of 42.  Patient further review of data suggest that patient blood sugars are running really low between 1:00 a.m. and 7:00 a.m. on few days.  Patient blood sugars seem to be under good control throughout the day but they tend to slowly go down after 10:00 p.m. patient reports adherence with medication.  Patient is currently taking Lantus 16 units and Humalog 6 units before each meal.    Patient has three-vessel coronary artery disease and is being followed by Dr. Barrios.  Medical management is recommended by cardiologist.  Patient blood pressure seem under good control.  Patient today reports that she can hardly walk 5-10 steps before getting short of breath.  Patient reports she is getting short of breath with ADLs and can not change clothes, take a shower before getting short of breath.  Patient denies PND orthopnea but has occasional pedal edema.  Patient metolazone dose was increased and that seem to have help with edema      Review of Systems   Constitutional:  Negative for chills, diaphoresis, fever, malaise/fatigue and weight loss.   HENT:  Negative for congestion, ear pain, sinus pain, sore throat and tinnitus.    Eyes:  Negative for blurred vision and photophobia.   Respiratory:  Positive for shortness of breath. Negative for cough, hemoptysis and wheezing.    Cardiovascular:  Negative for chest pain, palpitations, orthopnea, leg swelling and PND.   Gastrointestinal:  Negative for abdominal pain, blood in stool, constipation, diarrhea, heartburn, melena, nausea and vomiting.   Genitourinary:  Negative for dysuria, frequency and urgency.   Musculoskeletal:   "Negative for back pain, myalgias and neck pain.   Skin:  Negative for rash.   Neurological:  Negative for dizziness, tremors, seizures, loss of consciousness and weakness.   Endo/Heme/Allergies:  Negative for polydipsia.   Psychiatric/Behavioral:  Negative for depression and hallucinations. The patient does not have insomnia.      Objective:   /60 (BP Location: Left arm, Patient Position: Sitting, BP Method: Large (Automatic))   Pulse (!) 57   Temp 96.8 °F (36 °C) (Temporal)   Resp 16   Ht 4' 11" (1.499 m)   Wt 64.4 kg (142 lb)   SpO2 99%   BMI 28.68 kg/m²     Physical Exam  Constitutional:       General: She is not in acute distress.     Appearance: She is not diaphoretic.   Neck:      Thyroid: No thyromegaly.   Cardiovascular:      Rate and Rhythm: Normal rate and regular rhythm.      Heart sounds: Normal heart sounds. No murmur heard.  Pulmonary:      Effort: Pulmonary effort is normal. No respiratory distress.      Breath sounds: Normal breath sounds. No wheezing.   Abdominal:      General: Bowel sounds are normal. There is no distension.      Palpations: Abdomen is soft.      Tenderness: There is no abdominal tenderness.   Musculoskeletal:      Right lower leg: Edema (1+ edema) present.      Left lower leg: Edema (1+ edema) present.   Lymphadenopathy:      Cervical: No cervical adenopathy.   Neurological:      Mental Status: She is alert and oriented to person, place, and time.   Psychiatric:         Behavior: Behavior normal.         Thought Content: Thought content normal.         Judgment: Judgment normal.     Assessment:       ICD-10-CM ICD-9-CM   1. Shortness of breath  R06.02 786.05   2. Type 2 diabetes mellitus with diabetic polyneuropathy, without long-term current use of insulin  E11.42 250.60     357.2       Plan:     Patient has diabetes and blood sugars seem to be running on lower side.  Will decrease Lantus from 16-12 units  Will decrease Lantus from 16---->12 units  Patient reports " having a small dinner, will stop pre meal insulin before dinner  Advised patient to restart insulin before dinner if blood sugar goes higher than 200  Will continue to monitor blood sugar.  Repeat labs are ordered but not yet done.  Patient has chest pain with minimal exertion, even performing ADLs is making her short of breath  Patient has triple-vessel disease.  Talked to Dr. Barrios and request an early appointment for further evaluation  Cardiologist will make an early appointment        Medication List with Changes/Refills   Current Medications    ACETAMINOPHEN (TYLENOL) 500 MG TABLET    Take 500 mg by mouth every 6 (six) hours as needed for Pain.    AMLODIPINE (NORVASC) 5 MG TABLET    Take 1 tablet (5 mg total) by mouth once daily.    ASPIRIN 81 MG CHEW    Take 1 tablet (81 mg total) by mouth once daily.    ATORVASTATIN (LIPITOR) 40 MG TABLET    Take 1 tablet (40 mg total) by mouth once daily.    BLOOD SUGAR DIAGNOSTIC (ONETOUCH ULTRA TEST) STRP    CHECK BLOOD GLUCOSE 3 TIMES A DAY    BUSPIRONE (BUSPAR) 10 MG TABLET    Take 10 mg by mouth 2 (two) times daily. as needed for anxiety.    CEPHALEXIN (KEFLEX) 250 MG CAPSULE    Take 1 capsule (250 mg total) by mouth Daily.    DULOXETINE (CYMBALTA) 30 MG CAPSULE    Take 30 mg by mouth once daily.    DULOXETINE (CYMBALTA) 60 MG CAPSULE    Take 60 mg by mouth once.    FUROSEMIDE (LASIX) 40 MG TABLET    Please take 40 and 80 mg alternatingly    GABAPENTIN (NEURONTIN) 300 MG CAPSULE    TAKE 1 CAPSULE BY MOUTH THREE TIMES DAILY    HYDRALAZINE (APRESOLINE) 50 MG TABLET    Take 1 tablet (50 mg total) by mouth every 12 (twelve) hours.    INSULIN (LANTUS SOLOSTAR U-100 INSULIN) GLARGINE 100 UNITS/ML SUBQ PEN    Inject 14 Units into the skin once daily.    INSULIN ASPART U-100 (NOVOLOG FLEXPEN U-100 INSULIN) 100 UNIT/ML (3 ML) INPN PEN    Inject 5 Units into the skin 3 (three) times daily with meals.    LANCETS 32 GAUGE MISC    1 lancet by Misc.(Non-Drug; Combo Route) route 3  "(three) times daily.    LOSARTAN (COZAAR) 100 MG TABLET    TAKE 1 TABLET BY MOUTH ONCE DAILY FOR 30 DAYS    METOLAZONE (ZAROXOLYN) 2.5 MG TABLET    TAKE 1 TABLET BY MOUTH EVERY OTHER DAY FOR 5 DOSES    METOPROLOL TARTRATE (LOPRESSOR) 50 MG TABLET    Take 1 tablet by mouth twice daily    MIRTAZAPINE (REMERON) 15 MG TABLET    Take 15 mg by mouth every evening.    MONTELUKAST (SINGULAIR) 10 MG TABLET    TAKE 1 TABLET BY MOUTH ONCE DAILY FOR 30 DAYS    OMEPRAZOLE (PRILOSEC) 40 MG CAPSULE    Take 1 capsule (40 mg total) by mouth once daily.    PEN NEEDLE, DIABETIC (BD ULTRA-FINE SHORT PEN NEEDLE) 31 GAUGE X 5/16" NDLE    1 each by Misc.(Non-Drug; Combo Route) route 5 (five) times daily.        "

## 2023-09-13 ENCOUNTER — CLINICAL SUPPORT (OUTPATIENT)
Dept: OBSTETRICS AND GYNECOLOGY | Facility: CLINIC | Age: 63
End: 2023-09-13
Payer: MEDICARE

## 2023-09-13 ENCOUNTER — TELEPHONE (OUTPATIENT)
Dept: PRIMARY CARE CLINIC | Facility: CLINIC | Age: 63
End: 2023-09-13
Payer: MEDICARE

## 2023-09-13 DIAGNOSIS — Z01.89 ROUTINE LAB DRAW: Primary | ICD-10-CM

## 2023-09-13 LAB — BNP SERPL-MCNC: 156 PG/ML (ref 0–100)

## 2023-09-13 PROCEDURE — 36415 COLL VENOUS BLD VENIPUNCTURE: CPT | Mod: S$GLB,,, | Performed by: INTERNAL MEDICINE

## 2023-09-13 PROCEDURE — 36415 PR COLLECTION VENOUS BLOOD,VENIPUNCTURE: ICD-10-PCS | Mod: S$GLB,,, | Performed by: INTERNAL MEDICINE

## 2023-09-13 NOTE — TELEPHONE ENCOUNTER
----- Message from Valentín Kennedy sent at 9/13/2023 11:05 AM CDT -----  Contact: Pt patricia Ram  Is calling rg needing to have the pt's orders for dexa scan placed in system to schedule and can be reached at [phone]//thanks/dbw

## 2023-09-14 ENCOUNTER — TELEPHONE (OUTPATIENT)
Dept: PRIMARY CARE CLINIC | Facility: CLINIC | Age: 63
End: 2023-09-14
Payer: MEDICARE

## 2023-09-14 NOTE — TELEPHONE ENCOUNTER
S/w patient's daughter and gave lab results, advised to get rest of labs done. Patient will come tomorrow morning for fasting labs.

## 2023-09-15 ENCOUNTER — CLINICAL SUPPORT (OUTPATIENT)
Dept: OBSTETRICS AND GYNECOLOGY | Facility: CLINIC | Age: 63
End: 2023-09-15
Payer: MEDICARE

## 2023-09-15 DIAGNOSIS — D64.9 ANEMIA, UNSPECIFIED TYPE: Primary | ICD-10-CM

## 2023-09-15 DIAGNOSIS — Z01.89 ROUTINE LAB DRAW: Primary | ICD-10-CM

## 2023-09-15 LAB
ABS NRBC COUNT: 0 THOU/UL (ref 0–0.01)
ABSOLUTE BASOPHIL: 0.1 10*3/UL (ref 0–0.3)
ABSOLUTE EOSINOPHIL: 2.4 10*3/UL (ref 0–0.6)
ABSOLUTE IMMATURE GRAN: 0.01 THOU/UL (ref 0–0.03)
ABSOLUTE LYMPHOCYTE: 1.4 10*3/UL (ref 1.2–4)
ABSOLUTE MONOCYTE: 0.7 10*3/UL (ref 0.1–0.8)
ALBUMIN SERPL BCP-MCNC: 3.5 G/DL (ref 3.4–5)
ALP SERPL-CCNC: 76 U/L (ref 45–117)
ALT SERPL W P-5'-P-CCNC: 31 U/L (ref 13–56)
ANION GAP SERPL CALC-SCNC: 4 MMOL/L (ref 3–11)
AST SERPL-CCNC: 19 U/L (ref 15–37)
BASOPHILS NFR BLD: 0.7 % (ref 0–3)
BILIRUB SERPL-MCNC: 0.3 MG/DL (ref 0.2–1)
BUN SERPL-MCNC: 45 MG/DL (ref 7–18)
BUN/CREAT SERPL: 18.75 RATIO
CALCIUM SERPL-MCNC: 9.1 MG/DL (ref 8.5–10.1)
CHLORIDE SERPL-SCNC: 109 MMOL/L (ref 98–107)
CHOLEST SERPL-MSCNC: 102 MG/DL
CO2 SERPL-SCNC: 25 MMOL/L (ref 21–32)
CREAT SERPL-MCNC: 2.4 MG/DL (ref 0.55–1.02)
EOSINOPHIL NFR BLD: 26.6 % (ref 0–6)
ERYTHROCYTE [DISTWIDTH] IN BLOOD BY AUTOMATED COUNT: 13.6 % (ref 0–15.5)
ESTIMATED AVG GLUCOSE: 147 MG/DL
GFR ESTIMATION: 25
GLUCOSE SERPL-MCNC: 155 MG/DL (ref 74–106)
HBA1C MFR BLD: 6.3 % (ref 4.2–6.3)
HCT VFR BLD AUTO: 28 % (ref 37–47)
HDLC SERPL-MCNC: 22 MG/DL
HGB BLD-MCNC: 8.8 G/DL (ref 12–16)
IMMATURE GRANULOCYTES: 0.1 % (ref 0–0.43)
LDLC SERPL CALC-MCNC: 29.4 MG/DL
LYMPHOCYTES NFR BLD: 15.4 % (ref 20–45)
MCH RBC QN AUTO: 29.2 PG (ref 27–32)
MCHC RBC AUTO-ENTMCNC: 31.4 % (ref 32–36)
MCV RBC AUTO: 93 FL (ref 80–99)
MONOCYTES NFR BLD: 8.1 % (ref 2–10)
NEUTROPHILS # BLD AUTO: 4.5 10*3/UL (ref 1.4–7)
NEUTROPHILS NFR BLD: 49.1 % (ref 50–80)
NUCLEATED RED BLOOD CELLS: 0 % (ref 0–0.2)
PERIPHERAL SMEAR: NORMAL
PHOSPHATE FLD-MCNC: 4 MG/DL (ref 2.5–4.9)
PLATELETS: 287 10*3/UL (ref 130–400)
PMV BLD AUTO: 11.2 FL (ref 9.2–12.2)
POTASSIUM SERPL-SCNC: 4.9 MMOL/L (ref 3.5–5.1)
PROT SERPL-MCNC: 8 G/DL (ref 6.4–8.2)
RBC # BLD AUTO: 3.01 10*6/UL (ref 4.2–5.4)
SODIUM BLD-SCNC: 138 MMOL/L (ref 131–143)
TRIGL SERPL-MCNC: 253 MG/DL (ref 0–149)
TSH SERPL DL<=0.005 MIU/L-ACNC: 1.57 UIU/ML (ref 0.36–3.74)
VLDL CHOLESTEROL: 51 MG/DL
WBC # BLD: 9.2 10*3/UL (ref 4.5–10)

## 2023-09-15 PROCEDURE — 36415 COLL VENOUS BLD VENIPUNCTURE: CPT | Mod: S$GLB,,, | Performed by: INTERNAL MEDICINE

## 2023-09-15 PROCEDURE — 36415 PR COLLECTION VENOUS BLOOD,VENIPUNCTURE: ICD-10-PCS | Mod: S$GLB,,, | Performed by: INTERNAL MEDICINE

## 2023-09-18 DIAGNOSIS — M85.89 OSTEOPENIA OF MULTIPLE SITES: Primary | ICD-10-CM

## 2023-09-18 RX ORDER — LYSINE HCL 500 MG
1 TABLET ORAL DAILY
Qty: 100 TABLET | Refills: 3 | COMMUNITY
Start: 2023-09-18 | End: 2023-09-26

## 2023-09-22 ENCOUNTER — CLINICAL SUPPORT (OUTPATIENT)
Dept: OBSTETRICS AND GYNECOLOGY | Facility: CLINIC | Age: 63
End: 2023-09-22
Payer: MEDICARE

## 2023-09-22 DIAGNOSIS — Z01.89 ROUTINE LAB DRAW: Primary | ICD-10-CM

## 2023-09-22 LAB
% SATURATION: 20 % (ref 20–50)
HCT VFR BLD AUTO: 28.1 % (ref 37–47)
IMMATURE RETIC FRACTION: 7.1 % (ref 1.7–16.3)
IRON: 43 UG/DL (ref 50–170)
Lab: 1.3 % (ref 0.5–1.5)
RET-HE: 32.5 PG (ref 31.8–37.6)
RETIC %: 2 % (ref 0.8–2.6)
RETICULOCYTE ABSOLUTE COUNT: 0.06
TOTAL IRON BINDING CAPACITY: 218 UG/DL (ref 250–450)

## 2023-09-22 PROCEDURE — 36415 PR COLLECTION VENOUS BLOOD,VENIPUNCTURE: ICD-10-PCS | Mod: S$GLB,,, | Performed by: INTERNAL MEDICINE

## 2023-09-22 PROCEDURE — 36415 COLL VENOUS BLD VENIPUNCTURE: CPT | Mod: S$GLB,,, | Performed by: INTERNAL MEDICINE

## 2023-09-26 ENCOUNTER — OFFICE VISIT (OUTPATIENT)
Dept: PRIMARY CARE CLINIC | Facility: CLINIC | Age: 63
End: 2023-09-26
Payer: MEDICARE

## 2023-09-26 VITALS
DIASTOLIC BLOOD PRESSURE: 62 MMHG | WEIGHT: 141 LBS | SYSTOLIC BLOOD PRESSURE: 138 MMHG | HEART RATE: 55 BPM | HEIGHT: 59 IN | BODY MASS INDEX: 28.43 KG/M2 | TEMPERATURE: 98 F | RESPIRATION RATE: 16 BRPM | OXYGEN SATURATION: 98 %

## 2023-09-26 DIAGNOSIS — E11.42 TYPE 2 DIABETES MELLITUS WITH DIABETIC POLYNEUROPATHY, WITHOUT LONG-TERM CURRENT USE OF INSULIN: Primary | ICD-10-CM

## 2023-09-26 DIAGNOSIS — R06.02 SHORTNESS OF BREATH: ICD-10-CM

## 2023-09-26 DIAGNOSIS — R60.0 LOCALIZED EDEMA: ICD-10-CM

## 2023-09-26 PROBLEM — E66.01 SEVERE OBESITY (BMI 35.0-39.9) WITH COMORBIDITY: Status: RESOLVED | Noted: 2023-09-26 | Resolved: 2023-09-26

## 2023-09-26 PROBLEM — E66.01 SEVERE OBESITY (BMI 35.0-39.9) WITH COMORBIDITY: Status: ACTIVE | Noted: 2023-09-26

## 2023-09-26 PROCEDURE — 1157F PR ADVANCE CARE PLAN OR EQUIV PRESENT IN MEDICAL RECORD: ICD-10-PCS | Mod: CPTII,S$GLB,, | Performed by: INTERNAL MEDICINE

## 2023-09-26 PROCEDURE — 3044F HG A1C LEVEL LT 7.0%: CPT | Mod: CPTII,S$GLB,, | Performed by: INTERNAL MEDICINE

## 2023-09-26 PROCEDURE — 99214 OFFICE O/P EST MOD 30 MIN: CPT | Mod: 25,S$GLB,, | Performed by: INTERNAL MEDICINE

## 2023-09-26 PROCEDURE — 3078F DIAST BP <80 MM HG: CPT | Mod: CPTII,S$GLB,, | Performed by: INTERNAL MEDICINE

## 2023-09-26 PROCEDURE — 1157F ADVNC CARE PLAN IN RCRD: CPT | Mod: CPTII,S$GLB,, | Performed by: INTERNAL MEDICINE

## 2023-09-26 PROCEDURE — 3008F PR BODY MASS INDEX (BMI) DOCUMENTED: ICD-10-PCS | Mod: CPTII,S$GLB,, | Performed by: INTERNAL MEDICINE

## 2023-09-26 PROCEDURE — 95251 PR GLUCOSE MONITOR, 72 HOUR, PHYS INTERP: ICD-10-PCS | Mod: S$GLB,,, | Performed by: INTERNAL MEDICINE

## 2023-09-26 PROCEDURE — 3078F PR MOST RECENT DIASTOLIC BLOOD PRESSURE < 80 MM HG: ICD-10-PCS | Mod: CPTII,S$GLB,, | Performed by: INTERNAL MEDICINE

## 2023-09-26 PROCEDURE — 4010F PR ACE/ARB THEARPY RXD/TAKEN: ICD-10-PCS | Mod: CPTII,S$GLB,, | Performed by: INTERNAL MEDICINE

## 2023-09-26 PROCEDURE — 1160F RVW MEDS BY RX/DR IN RCRD: CPT | Mod: CPTII,S$GLB,, | Performed by: INTERNAL MEDICINE

## 2023-09-26 PROCEDURE — 3075F SYST BP GE 130 - 139MM HG: CPT | Mod: CPTII,S$GLB,, | Performed by: INTERNAL MEDICINE

## 2023-09-26 PROCEDURE — 3075F PR MOST RECENT SYSTOLIC BLOOD PRESS GE 130-139MM HG: ICD-10-PCS | Mod: CPTII,S$GLB,, | Performed by: INTERNAL MEDICINE

## 2023-09-26 PROCEDURE — 99214 PR OFFICE/OUTPT VISIT, EST, LEVL IV, 30-39 MIN: ICD-10-PCS | Mod: 25,S$GLB,, | Performed by: INTERNAL MEDICINE

## 2023-09-26 PROCEDURE — 3008F BODY MASS INDEX DOCD: CPT | Mod: CPTII,S$GLB,, | Performed by: INTERNAL MEDICINE

## 2023-09-26 PROCEDURE — 3044F PR MOST RECENT HEMOGLOBIN A1C LEVEL <7.0%: ICD-10-PCS | Mod: CPTII,S$GLB,, | Performed by: INTERNAL MEDICINE

## 2023-09-26 PROCEDURE — 95251 CONT GLUC MNTR ANALYSIS I&R: CPT | Mod: S$GLB,,, | Performed by: INTERNAL MEDICINE

## 2023-09-26 PROCEDURE — 1159F PR MEDICATION LIST DOCUMENTED IN MEDICAL RECORD: ICD-10-PCS | Mod: CPTII,S$GLB,, | Performed by: INTERNAL MEDICINE

## 2023-09-26 PROCEDURE — 1159F MED LIST DOCD IN RCRD: CPT | Mod: CPTII,S$GLB,, | Performed by: INTERNAL MEDICINE

## 2023-09-26 PROCEDURE — 4010F ACE/ARB THERAPY RXD/TAKEN: CPT | Mod: CPTII,S$GLB,, | Performed by: INTERNAL MEDICINE

## 2023-09-26 PROCEDURE — 1160F PR REVIEW ALL MEDS BY PRESCRIBER/CLIN PHARMACIST DOCUMENTED: ICD-10-PCS | Mod: CPTII,S$GLB,, | Performed by: INTERNAL MEDICINE

## 2023-09-26 RX ORDER — ALBUTEROL SULFATE 90 UG/1
2 AEROSOL, METERED RESPIRATORY (INHALATION) EVERY 6 HOURS PRN
Qty: 18 G | Refills: 1 | Status: SHIPPED | OUTPATIENT
Start: 2023-09-26 | End: 2023-11-02

## 2023-09-26 RX ORDER — FUROSEMIDE 40 MG/1
40 TABLET ORAL DAILY
Qty: 90 TABLET | Refills: 3 | Status: SHIPPED | OUTPATIENT
Start: 2023-09-26 | End: 2024-01-05

## 2023-09-26 NOTE — PROGRESS NOTES
Subjective:      Patient ID: Radha Hoffman is a 63 y.o. female.    Chief Complaint: Diabetes (2wk f/u ) and Follow-up (2wk f/u patient's daughter reports pt has mold exposure)    :  Diabetes with last A1c of 7.3 worsen from 6.3.  Patient had CGM placed and data was interpreted last visit and was found to have hypoglycemic episode during the night and blood sugars were running high after meals.  Patient Lantus was decreased from 16----> 12 units and was started on Humalog before meals.  Today CGM data is downloaded and it suggest that blood sugar is running 159 with standard deviation of 55.  With 71% of the blood sugars under good control 23% are running high and 5% are running very high.  A few low blood sugar episodes are also noted.      Patient has three-vessel coronary artery disease and is being followed by Dr. Barrios.  Medical management of the CAD is recommended.  Patient reported shortness of breath on minimal exertion, and was referred to cardiologist for further evaluation.  Patient is evaluated by cardiologist and has ordered echo again.  Patient denies PND, orthopnea but has occasional pedal edema that is improved after taking Lasix 40 mg daily.  Patient is taking metolazone twice a week as well.  Patient has shortness of breath on minimal exertion and reports that she found that there is mold in her apartment.  Cough wheezing, fever or chill    Patient last labs suggested anemia with hemoglobin of 8.8 and iron studies were normal.  Patient retic count is low suggesting proliferation.  Patient has previous history of ulcer and was admitted in hospital in December 2022.  Blood in stool or black color stool.     Review of Systems   Constitutional:  Positive for chills. Negative for diaphoresis, fever, malaise/fatigue and weight loss.   HENT:  Negative for congestion, ear pain, sinus pain, sore throat and tinnitus.    Eyes:  Positive for blurred vision (Being followed by ophthalmologist).  "Negative for photophobia.   Respiratory:  Positive for shortness of breath. Negative for cough, hemoptysis and wheezing.    Cardiovascular:  Negative for chest pain, palpitations, orthopnea, leg swelling and PND.   Gastrointestinal:  Negative for abdominal pain, blood in stool, constipation, diarrhea, heartburn, melena, nausea and vomiting.   Genitourinary:  Negative for dysuria, frequency and urgency.   Musculoskeletal:  Negative for back pain, myalgias and neck pain.   Skin:  Negative for rash.   Neurological:  Negative for dizziness, tremors, seizures, loss of consciousness and weakness.   Endo/Heme/Allergies:  Negative for polydipsia.   Psychiatric/Behavioral:  Negative for depression and hallucinations. The patient does not have insomnia.      Objective:   /62 (BP Location: Left arm, Patient Position: Sitting, BP Method: Medium (Manual))   Pulse (!) 55   Temp 97.8 °F (36.6 °C) (Temporal)   Resp 16   Ht 4' 11" (1.499 m)   Wt 64 kg (141 lb)   SpO2 98%   BMI 28.48 kg/m²     Physical Exam  Constitutional:       General: She is not in acute distress.     Appearance: She is not diaphoretic.   Neck:      Thyroid: No thyromegaly.   Cardiovascular:      Rate and Rhythm: Normal rate and regular rhythm.      Heart sounds: Normal heart sounds. No murmur heard.  Pulmonary:      Effort: Pulmonary effort is normal. No respiratory distress.      Breath sounds: Normal breath sounds. No wheezing.   Abdominal:      General: Bowel sounds are normal. There is no distension.      Palpations: Abdomen is soft.      Tenderness: There is no abdominal tenderness.   Lymphadenopathy:      Cervical: No cervical adenopathy.   Neurological:      Mental Status: She is alert and oriented to person, place, and time.   Psychiatric:         Behavior: Behavior normal.         Thought Content: Thought content normal.         Judgment: Judgment normal.       Assessment:       ICD-10-CM ICD-9-CM   1. Type 2 diabetes mellitus with diabetic " polyneuropathy, without long-term current use of insulin  E11.42 250.60     357.2   2. Localized edema  R60.0 782.3   3. Shortness of breath  R06.02 786.05       Plan:     Patient with diabetes with last A1c of 7.3 is at goal.  Patient was having multiple hypoglycemic episodes and the dose was adjusted and hypoglycemia seem markedly improved  Patient blood pressure seem to be running high today.  Will repeat blood pressure  Repeat blood pressures are better than before.  Will not change medication  Patient has persistent shortness of breath that can be secondary to anemia  Patient is being followed by nephrologist and is ordered iron infusion + B12 shots  Patient last iron studies were normal.  Patient retic count suggest hypo proliferation probably secondary to chronic kidney disease.   Will get notes from nephrologist  Patient is also evaluated by cardiologist and echocardiogram + PET Scan is ordered.  Will get notes from cardiologist  Patient shortness of breath can be secondary to exposure to mold.  Will get chest x-ray and will prescribe albuterol inhaler      Medication List with Changes/Refills   New Medications    ALBUTEROL (PROAIR HFA) 90 MCG/ACTUATION INHALER    Inhale 2 puffs into the lungs every 6 (six) hours as needed for Wheezing. Rescue   Current Medications    ACETAMINOPHEN (TYLENOL) 500 MG TABLET    Take 500 mg by mouth every 6 (six) hours as needed for Pain.    AMLODIPINE (NORVASC) 5 MG TABLET    Take 1 tablet (5 mg total) by mouth once daily.    ASPIRIN 81 MG CHEW    Take 1 tablet (81 mg total) by mouth once daily.    ATORVASTATIN (LIPITOR) 40 MG TABLET    Take 1 tablet (40 mg total) by mouth once daily.    BLOOD SUGAR DIAGNOSTIC (ONETOUCH ULTRA TEST) [facility]    CHECK BLOOD GLUCOSE 3 TIMES A DAY    BUSPIRONE (BUSPAR) 10 MG TABLET    Take 10 mg by mouth 2 (two) times daily. as needed for anxiety.    CEPHALEXIN (KEFLEX) 250 MG CAPSULE    Take 1 capsule (250 mg total) by mouth Daily.    DULOXETINE  "(CYMBALTA) 30 MG CAPSULE    Take 30 mg by mouth once daily.    DULOXETINE (CYMBALTA) 60 MG CAPSULE    Take 60 mg by mouth once.    GABAPENTIN (NEURONTIN) 300 MG CAPSULE    TAKE 1 CAPSULE BY MOUTH THREE TIMES DAILY    HYDRALAZINE (APRESOLINE) 50 MG TABLET    Take 1 tablet (50 mg total) by mouth every 12 (twelve) hours.    INSULIN (LANTUS SOLOSTAR U-100 INSULIN) GLARGINE 100 UNITS/ML SUBQ PEN    Inject 14 Units into the skin once daily.    INSULIN ASPART U-100 (NOVOLOG FLEXPEN U-100 INSULIN) 100 UNIT/ML (3 ML) INPN PEN    Inject 5 Units into the skin 3 (three) times daily with meals.    LANCETS 32 GAUGE MISC    1 lancet by Misc.(Non-Drug; Combo Route) route 3 (three) times daily.    LOSARTAN (COZAAR) 100 MG TABLET    TAKE 1 TABLET BY MOUTH ONCE DAILY FOR 30 DAYS    METOLAZONE (ZAROXOLYN) 2.5 MG TABLET    TAKE 1 TABLET BY MOUTH EVERY OTHER DAY FOR 5 DOSES    METOPROLOL TARTRATE (LOPRESSOR) 50 MG TABLET    Take 1 tablet by mouth twice daily    MIRTAZAPINE (REMERON) 15 MG TABLET    Take 15 mg by mouth every evening.    MONTELUKAST (SINGULAIR) 10 MG TABLET    TAKE 1 TABLET BY MOUTH ONCE DAILY FOR 30 DAYS    OMEPRAZOLE (PRILOSEC) 40 MG CAPSULE    Take 1 capsule (40 mg total) by mouth once daily.    PEN NEEDLE, DIABETIC (BD ULTRA-FINE SHORT PEN NEEDLE) 31 GAUGE X 5/16" NDLE    1 each by Misc.(Non-Drug; Combo Route) route 5 (five) times daily.   Changed and/or Refilled Medications    Modified Medication Previous Medication    FUROSEMIDE (LASIX) 40 MG TABLET furosemide (LASIX) 40 MG tablet       Take 1 tablet (40 mg total) by mouth once daily. Take 1 tablet a day    Please take 40 and 80 mg alternatingly   Discontinued Medications    CALCIUM CARBONATE-VIT D3- MG CALCIUM- 400 UNIT TAB    Take 1 tablet by mouth once daily.        "

## 2023-10-03 DIAGNOSIS — E11.42 TYPE 2 DIABETES MELLITUS WITH DIABETIC POLYNEUROPATHY, WITH LONG-TERM CURRENT USE OF INSULIN: ICD-10-CM

## 2023-10-03 DIAGNOSIS — Z79.4 TYPE 2 DIABETES MELLITUS WITH DIABETIC POLYNEUROPATHY, WITH LONG-TERM CURRENT USE OF INSULIN: ICD-10-CM

## 2023-10-03 RX ORDER — GABAPENTIN 300 MG/1
CAPSULE ORAL
Qty: 90 CAPSULE | Refills: 0 | Status: SHIPPED | OUTPATIENT
Start: 2023-10-03

## 2023-11-02 DIAGNOSIS — R06.02 SHORTNESS OF BREATH: ICD-10-CM

## 2023-11-02 RX ORDER — ALBUTEROL SULFATE 90 UG/1
2 AEROSOL, METERED RESPIRATORY (INHALATION) EVERY 6 HOURS PRN
Qty: 9 G | Refills: 0 | Status: SHIPPED | OUTPATIENT
Start: 2023-11-02 | End: 2023-11-08

## 2023-11-07 PROCEDURE — G0180 MD CERTIFICATION HHA PATIENT: HCPCS | Mod: ,,, | Performed by: INTERNAL MEDICINE

## 2023-11-08 ENCOUNTER — OFFICE VISIT (OUTPATIENT)
Dept: PRIMARY CARE CLINIC | Facility: CLINIC | Age: 63
End: 2023-11-08
Payer: MEDICARE

## 2023-11-08 VITALS
BODY MASS INDEX: 27.66 KG/M2 | HEART RATE: 63 BPM | RESPIRATION RATE: 16 BRPM | TEMPERATURE: 97 F | WEIGHT: 137.19 LBS | OXYGEN SATURATION: 98 % | SYSTOLIC BLOOD PRESSURE: 160 MMHG | HEIGHT: 59 IN | DIASTOLIC BLOOD PRESSURE: 72 MMHG

## 2023-11-08 DIAGNOSIS — N18.32 STAGE 3B CHRONIC KIDNEY DISEASE: ICD-10-CM

## 2023-11-08 DIAGNOSIS — I10 BENIGN ESSENTIAL HTN: ICD-10-CM

## 2023-11-08 DIAGNOSIS — M54.50 CHRONIC MIDLINE LOW BACK PAIN WITHOUT SCIATICA: ICD-10-CM

## 2023-11-08 DIAGNOSIS — E11.42 TYPE 2 DIABETES MELLITUS WITH DIABETIC POLYNEUROPATHY, WITHOUT LONG-TERM CURRENT USE OF INSULIN: Primary | ICD-10-CM

## 2023-11-08 DIAGNOSIS — G89.29 CHRONIC MIDLINE LOW BACK PAIN WITHOUT SCIATICA: ICD-10-CM

## 2023-11-08 DIAGNOSIS — R29.898 BILATERAL LEG WEAKNESS: ICD-10-CM

## 2023-11-08 PROBLEM — I50.9 CHF (CONGESTIVE HEART FAILURE): Status: ACTIVE | Noted: 2023-11-08

## 2023-11-08 PROCEDURE — 3044F PR MOST RECENT HEMOGLOBIN A1C LEVEL <7.0%: ICD-10-PCS | Mod: CPTII,S$GLB,, | Performed by: INTERNAL MEDICINE

## 2023-11-08 PROCEDURE — 99214 OFFICE O/P EST MOD 30 MIN: CPT | Mod: S$GLB,,, | Performed by: INTERNAL MEDICINE

## 2023-11-08 PROCEDURE — 1159F PR MEDICATION LIST DOCUMENTED IN MEDICAL RECORD: ICD-10-PCS | Mod: CPTII,S$GLB,, | Performed by: INTERNAL MEDICINE

## 2023-11-08 PROCEDURE — 1157F ADVNC CARE PLAN IN RCRD: CPT | Mod: CPTII,S$GLB,, | Performed by: INTERNAL MEDICINE

## 2023-11-08 PROCEDURE — 3044F HG A1C LEVEL LT 7.0%: CPT | Mod: CPTII,S$GLB,, | Performed by: INTERNAL MEDICINE

## 2023-11-08 PROCEDURE — 1157F PR ADVANCE CARE PLAN OR EQUIV PRESENT IN MEDICAL RECORD: ICD-10-PCS | Mod: CPTII,S$GLB,, | Performed by: INTERNAL MEDICINE

## 2023-11-08 PROCEDURE — 4010F PR ACE/ARB THEARPY RXD/TAKEN: ICD-10-PCS | Mod: CPTII,S$GLB,, | Performed by: INTERNAL MEDICINE

## 2023-11-08 PROCEDURE — 1160F RVW MEDS BY RX/DR IN RCRD: CPT | Mod: CPTII,S$GLB,, | Performed by: INTERNAL MEDICINE

## 2023-11-08 PROCEDURE — 3078F PR MOST RECENT DIASTOLIC BLOOD PRESSURE < 80 MM HG: ICD-10-PCS | Mod: CPTII,S$GLB,, | Performed by: INTERNAL MEDICINE

## 2023-11-08 PROCEDURE — 3008F PR BODY MASS INDEX (BMI) DOCUMENTED: ICD-10-PCS | Mod: CPTII,S$GLB,, | Performed by: INTERNAL MEDICINE

## 2023-11-08 PROCEDURE — 3008F BODY MASS INDEX DOCD: CPT | Mod: CPTII,S$GLB,, | Performed by: INTERNAL MEDICINE

## 2023-11-08 PROCEDURE — 1159F MED LIST DOCD IN RCRD: CPT | Mod: CPTII,S$GLB,, | Performed by: INTERNAL MEDICINE

## 2023-11-08 PROCEDURE — 1160F PR REVIEW ALL MEDS BY PRESCRIBER/CLIN PHARMACIST DOCUMENTED: ICD-10-PCS | Mod: CPTII,S$GLB,, | Performed by: INTERNAL MEDICINE

## 2023-11-08 PROCEDURE — 3077F SYST BP >= 140 MM HG: CPT | Mod: CPTII,S$GLB,, | Performed by: INTERNAL MEDICINE

## 2023-11-08 PROCEDURE — 99214 PR OFFICE/OUTPT VISIT, EST, LEVL IV, 30-39 MIN: ICD-10-PCS | Mod: S$GLB,,, | Performed by: INTERNAL MEDICINE

## 2023-11-08 PROCEDURE — 3077F PR MOST RECENT SYSTOLIC BLOOD PRESSURE >= 140 MM HG: ICD-10-PCS | Mod: CPTII,S$GLB,, | Performed by: INTERNAL MEDICINE

## 2023-11-08 PROCEDURE — 4010F ACE/ARB THERAPY RXD/TAKEN: CPT | Mod: CPTII,S$GLB,, | Performed by: INTERNAL MEDICINE

## 2023-11-08 PROCEDURE — 3078F DIAST BP <80 MM HG: CPT | Mod: CPTII,S$GLB,, | Performed by: INTERNAL MEDICINE

## 2023-11-08 RX ORDER — ACETAMINOPHEN AND CODEINE PHOSPHATE 300; 30 MG/1; MG/1
1 TABLET ORAL EVERY 6 HOURS PRN
Qty: 20 TABLET | Refills: 0 | Status: SHIPPED | OUTPATIENT
Start: 2023-11-08 | End: 2023-11-18

## 2023-11-08 RX ORDER — CIPROFLOXACIN 500 MG/1
500 TABLET ORAL EVERY 12 HOURS
COMMUNITY
Start: 2023-11-06 | End: 2023-11-29

## 2023-11-08 RX ORDER — CARVEDILOL 6.25 MG/1
6.25 TABLET ORAL 2 TIMES DAILY WITH MEALS
Qty: 60 TABLET | Refills: 11 | Status: SHIPPED | OUTPATIENT
Start: 2023-11-08 | End: 2024-01-05

## 2023-11-08 NOTE — PROGRESS NOTES
Subjective:      Patient ID: Radha Hoffman is a 63 y.o. female.    Chief Complaint: Diabetes (F/u ) and Anemia (F/u from [facility] for anemia on 11/04/2023)    HPI:  Patient with diabetes with last A1c of 7.3 is close to goal.  Patient A1c is falsely high probably secondary to patient anemia.  Patient CGM data previously suggested blood sugars are under good control.  Patient has not been monitoring blood sugar in last few weeks.    Patient has triple-vessel disease and is being followed by Dr. Barrios.  Medical management of coronary artery disease is recommended.  Patient is also on Lasix and metolazone probably secondary to CHF.  Patient is not aware of recent echocardiogram.  Patient denies PND, orthopnea.  Patient has hypertension and blood pressure does not seem under good control.     Patient was recently admitted to hospital secondary to anemia with hemoglobin of 7.7.  Patient also complains of fatigue and generalized malaise.  Patient also complains of bilateral leg weakness and difficulty walking.  Patient had blood transfusion and hemoglobin improved from 7.7-9.3 on discharge.  Patient fatigue improved.  Patient had MRI lumbar spine that showed degenerative changes without any severe spinal stenosis.  Patient still complains of bilateral leg pain starting from mid thigh all the way to the toes.  Pain is moderate in intensity, constant.  Patient low back pain is moderate in intensity as well without any radiation down the legs.  Pain is worsened with activity and improved with rest.  Patient is taking Tylenol with little help but when it is severe patient pain is not under control    Patient has recurrent urinary tract infection and is on Keflex for prophylaxis.  In hospital stay patient was found to have UTI and is on ciprofloxacin    Review of Systems   Constitutional:  Negative for chills, diaphoresis, fever, malaise/fatigue and weight loss.   HENT:  Negative for congestion, ear pain, sinus pain,  "sore throat and tinnitus.    Eyes:  Negative for blurred vision and photophobia.   Respiratory:  Negative for cough, hemoptysis, shortness of breath and wheezing.    Cardiovascular:  Negative for chest pain, palpitations, orthopnea, leg swelling and PND.   Gastrointestinal:  Negative for abdominal pain, blood in stool, constipation, diarrhea, heartburn, melena, nausea and vomiting.   Genitourinary:  Negative for dysuria, frequency and urgency.   Musculoskeletal:  Positive for back pain. Negative for myalgias and neck pain.   Skin:  Negative for rash.   Neurological:  Positive for weakness (Bilateral leg weakness). Negative for dizziness, tremors, seizures and loss of consciousness.   Endo/Heme/Allergies:  Negative for polydipsia.   Psychiatric/Behavioral:  Negative for depression and hallucinations. The patient does not have insomnia.      Objective:   BP (!) 160/72 (BP Location: Right arm, Patient Position: Sitting, BP Method: Medium (Automatic))   Pulse 63   Temp 97 °F (36.1 °C) (Temporal)   Resp 16   Ht 4' 11" (1.499 m)   Wt 62.2 kg (137 lb 3.2 oz)   SpO2 98%   BMI 27.71 kg/m²     Physical Exam  Constitutional:       General: She is not in acute distress.     Appearance: She is not diaphoretic.   Neck:      Thyroid: No thyromegaly.   Cardiovascular:      Rate and Rhythm: Normal rate and regular rhythm.      Heart sounds: Normal heart sounds. No murmur heard.  Pulmonary:      Effort: Pulmonary effort is normal. No respiratory distress.      Breath sounds: Normal breath sounds. No wheezing.   Abdominal:      General: Bowel sounds are normal. There is no distension.      Palpations: Abdomen is soft.      Tenderness: There is no abdominal tenderness.   Lymphadenopathy:      Cervical: No cervical adenopathy.   Neurological:      Mental Status: She is alert and oriented to person, place, and time.      Comments: Bilateral leg strength is 3/5   Psychiatric:         Behavior: Behavior normal.         Thought " Content: Thought content normal.         Judgment: Judgment normal.       Assessment:       ICD-10-CM ICD-9-CM   1. Type 2 diabetes mellitus with diabetic polyneuropathy, without long-term current use of insulin  E11.42 250.60     357.2   2. Chronic midline low back pain without sciatica  M54.50 724.2    G89.29 338.29   3. Benign essential HTN  I10 401.1   4. Bilateral leg weakness  R29.898 729.89       Plan:     Patient with diabetes and previously blood sugars were under good control.  Will continue same dose of insulin  Patient has coronary artery disease and symptoms are controlled with medication  Will continue same medications for now  Patient has questionable history of congestive heart failure as she is on Lasix and metolazone  Will get echocardiogram report from cardiologist  Patient blood pressures are running high will change metoprolol to carvedilol  Advised patient to come to clinic in a week to get blood pressures checked again  Patient has chronic low back pain that is not controlled with Tylenol alone  Will give Tylenol 3 for severe pain  Patient has bilateral leg weakness and has difficulty walking.  Patient has home health and home physical therapy should help  Patient request for wheelchair.  Will order  Will also order nerve conduction study  Patient has chronic kidney disease and that can be contributing to patient anemia  Patient last iron studies were normal.  Will repeat labs on return  Patient is on ciprofloxacin for UTI.  Advised patient to hold Keflex while she is on ciprofloxacin  Medication List with Changes/Refills   New Medications    ACETAMINOPHEN-CODEINE 300-30MG (TYLENOL #3) 300-30 MG TAB    Take 1 tablet by mouth every 6 (six) hours as needed (pain).    CARVEDILOL (COREG) 6.25 MG TABLET    Take 1 tablet (6.25 mg total) by mouth 2 (two) times daily with meals.   Current Medications    ACETAMINOPHEN (TYLENOL) 500 MG TABLET    Take 500 mg by mouth every 6 (six) hours as needed for  "Pain.    AMLODIPINE (NORVASC) 5 MG TABLET    Take 1 tablet (5 mg total) by mouth once daily.    ASPIRIN 81 MG CHEW    Take 1 tablet (81 mg total) by mouth once daily.    ATORVASTATIN (LIPITOR) 40 MG TABLET    Take 1 tablet (40 mg total) by mouth once daily.    BLOOD SUGAR DIAGNOSTIC (ONETOUCH ULTRA TEST) STRP    CHECK BLOOD GLUCOSE 3 TIMES A DAY    CEPHALEXIN (KEFLEX) 250 MG CAPSULE    Take 1 capsule (250 mg total) by mouth Daily.    CIPROFLOXACIN HCL (CIPRO) 500 MG TABLET    Take 500 mg by mouth every 12 (twelve) hours.    DULOXETINE (CYMBALTA) 60 MG CAPSULE    Take 60 mg by mouth once.    FUROSEMIDE (LASIX) 40 MG TABLET    Take 1 tablet (40 mg total) by mouth once daily. Take 1 tablet a day    GABAPENTIN (NEURONTIN) 300 MG CAPSULE    TAKE 1 CAPSULE BY MOUTH THREE TIMES DAILY    HYDRALAZINE (APRESOLINE) 50 MG TABLET    Take 1 tablet (50 mg total) by mouth every 12 (twelve) hours.    INSULIN (LANTUS SOLOSTAR U-100 INSULIN) GLARGINE 100 UNITS/ML SUBQ PEN    Inject 14 Units into the skin once daily.    INSULIN ASPART U-100 (NOVOLOG FLEXPEN U-100 INSULIN) 100 UNIT/ML (3 ML) INPN PEN    Inject 5 Units into the skin 3 (three) times daily with meals.    LANCETS 32 GAUGE MISC    1 lancet by Misc.(Non-Drug; Combo Route) route 3 (three) times daily.    LOSARTAN (COZAAR) 100 MG TABLET    TAKE 1 TABLET BY MOUTH ONCE DAILY FOR 30 DAYS    METOLAZONE (ZAROXOLYN) 2.5 MG TABLET    TAKE 1 TABLET BY MOUTH EVERY OTHER DAY FOR 5 DOSES    MIRTAZAPINE (REMERON) 15 MG TABLET    Take 15 mg by mouth every evening.    MONTELUKAST (SINGULAIR) 10 MG TABLET    TAKE 1 TABLET BY MOUTH ONCE DAILY FOR 30 DAYS    OMEPRAZOLE (PRILOSEC) 40 MG CAPSULE    Take 1 capsule (40 mg total) by mouth once daily.    PEN NEEDLE, DIABETIC (BD ULTRA-FINE SHORT PEN NEEDLE) 31 GAUGE X 5/16" NDLE    1 each by Misc.(Non-Drug; Combo Route) route 5 (five) times daily.   Discontinued Medications    ALBUTEROL (PROVENTIL/VENTOLIN HFA) 90 MCG/ACTUATION INHALER    INHALE 2 " PUFFS BY MOUTH EVERY 6 HOURS AS NEEDED FOR WHEEZING    BUSPIRONE (BUSPAR) 10 MG TABLET    Take 10 mg by mouth 2 (two) times daily. as needed for anxiety.    DULOXETINE (CYMBALTA) 30 MG CAPSULE    Take 30 mg by mouth once daily.    METOPROLOL TARTRATE (LOPRESSOR) 50 MG TABLET    Take 1 tablet by mouth twice daily

## 2023-11-15 ENCOUNTER — CLINICAL SUPPORT (OUTPATIENT)
Dept: PRIMARY CARE CLINIC | Facility: CLINIC | Age: 63
End: 2023-11-15
Payer: MEDICARE

## 2023-11-15 VITALS — DIASTOLIC BLOOD PRESSURE: 65 MMHG | HEART RATE: 67 BPM | SYSTOLIC BLOOD PRESSURE: 106 MMHG

## 2023-11-15 DIAGNOSIS — Z01.30 BLOOD PRESSURE CHECK: Primary | ICD-10-CM

## 2023-11-15 NOTE — PROGRESS NOTES
Patient came to clinic for a blood pressure check, MD notified. Blood pressure within normal range and denies lightheadedness/ dizziness.

## 2023-11-29 ENCOUNTER — OFFICE VISIT (OUTPATIENT)
Dept: PRIMARY CARE CLINIC | Facility: CLINIC | Age: 63
End: 2023-11-29
Payer: MEDICARE

## 2023-11-29 VITALS
BODY MASS INDEX: 27.42 KG/M2 | TEMPERATURE: 98 F | RESPIRATION RATE: 18 BRPM | SYSTOLIC BLOOD PRESSURE: 120 MMHG | HEIGHT: 59 IN | DIASTOLIC BLOOD PRESSURE: 69 MMHG | WEIGHT: 136 LBS | OXYGEN SATURATION: 96 % | HEART RATE: 62 BPM

## 2023-11-29 DIAGNOSIS — Z01.30 BLOOD PRESSURE CHECK: ICD-10-CM

## 2023-11-29 DIAGNOSIS — E11.42 TYPE 2 DIABETES MELLITUS WITH DIABETIC POLYNEUROPATHY, WITHOUT LONG-TERM CURRENT USE OF INSULIN: Primary | ICD-10-CM

## 2023-11-29 DIAGNOSIS — R29.898 BILATERAL LEG WEAKNESS: ICD-10-CM

## 2023-11-29 LAB — GLUCOSE SERPL-MCNC: 107 MG/DL (ref 70–110)

## 2023-11-29 PROCEDURE — 3008F BODY MASS INDEX DOCD: CPT | Mod: CPTII,S$GLB,, | Performed by: INTERNAL MEDICINE

## 2023-11-29 PROCEDURE — 1159F PR MEDICATION LIST DOCUMENTED IN MEDICAL RECORD: ICD-10-PCS | Mod: CPTII,S$GLB,, | Performed by: INTERNAL MEDICINE

## 2023-11-29 PROCEDURE — 82962 GLUCOSE BLOOD TEST: CPT | Mod: ,,, | Performed by: INTERNAL MEDICINE

## 2023-11-29 PROCEDURE — 4010F ACE/ARB THERAPY RXD/TAKEN: CPT | Mod: CPTII,S$GLB,, | Performed by: INTERNAL MEDICINE

## 2023-11-29 PROCEDURE — 1101F PR PT FALLS ASSESS DOC 0-1 FALLS W/OUT INJ PAST YR: ICD-10-PCS | Mod: CPTII,S$GLB,, | Performed by: INTERNAL MEDICINE

## 2023-11-29 PROCEDURE — 3074F SYST BP LT 130 MM HG: CPT | Mod: CPTII,S$GLB,, | Performed by: INTERNAL MEDICINE

## 2023-11-29 PROCEDURE — 3078F PR MOST RECENT DIASTOLIC BLOOD PRESSURE < 80 MM HG: ICD-10-PCS | Mod: CPTII,S$GLB,, | Performed by: INTERNAL MEDICINE

## 2023-11-29 PROCEDURE — 1157F PR ADVANCE CARE PLAN OR EQUIV PRESENT IN MEDICAL RECORD: ICD-10-PCS | Mod: CPTII,S$GLB,, | Performed by: INTERNAL MEDICINE

## 2023-11-29 PROCEDURE — 3008F PR BODY MASS INDEX (BMI) DOCUMENTED: ICD-10-PCS | Mod: CPTII,S$GLB,, | Performed by: INTERNAL MEDICINE

## 2023-11-29 PROCEDURE — 3074F PR MOST RECENT SYSTOLIC BLOOD PRESSURE < 130 MM HG: ICD-10-PCS | Mod: CPTII,S$GLB,, | Performed by: INTERNAL MEDICINE

## 2023-11-29 PROCEDURE — 99214 PR OFFICE/OUTPT VISIT, EST, LEVL IV, 30-39 MIN: ICD-10-PCS | Mod: S$GLB,,, | Performed by: INTERNAL MEDICINE

## 2023-11-29 PROCEDURE — 99214 OFFICE O/P EST MOD 30 MIN: CPT | Mod: S$GLB,,, | Performed by: INTERNAL MEDICINE

## 2023-11-29 PROCEDURE — 3288F PR FALLS RISK ASSESSMENT DOCUMENTED: ICD-10-PCS | Mod: CPTII,S$GLB,, | Performed by: INTERNAL MEDICINE

## 2023-11-29 PROCEDURE — 99499 NO LOS: ICD-10-PCS | Mod: CPTII,S$GLB,, | Performed by: INTERNAL MEDICINE

## 2023-11-29 PROCEDURE — 1157F ADVNC CARE PLAN IN RCRD: CPT | Mod: CPTII,S$GLB,, | Performed by: INTERNAL MEDICINE

## 2023-11-29 PROCEDURE — 1159F MED LIST DOCD IN RCRD: CPT | Mod: CPTII,S$GLB,, | Performed by: INTERNAL MEDICINE

## 2023-11-29 PROCEDURE — 3044F PR MOST RECENT HEMOGLOBIN A1C LEVEL <7.0%: ICD-10-PCS | Mod: CPTII,S$GLB,, | Performed by: INTERNAL MEDICINE

## 2023-11-29 PROCEDURE — 99499 UNLISTED E&M SERVICE: CPT | Mod: CPTII,S$GLB,, | Performed by: INTERNAL MEDICINE

## 2023-11-29 PROCEDURE — 3288F FALL RISK ASSESSMENT DOCD: CPT | Mod: CPTII,S$GLB,, | Performed by: INTERNAL MEDICINE

## 2023-11-29 PROCEDURE — 82962 POCT GLUCOSE, HAND-HELD DEVICE: ICD-10-PCS | Mod: ,,, | Performed by: INTERNAL MEDICINE

## 2023-11-29 PROCEDURE — 3044F HG A1C LEVEL LT 7.0%: CPT | Mod: CPTII,S$GLB,, | Performed by: INTERNAL MEDICINE

## 2023-11-29 PROCEDURE — 1160F RVW MEDS BY RX/DR IN RCRD: CPT | Mod: CPTII,S$GLB,, | Performed by: INTERNAL MEDICINE

## 2023-11-29 PROCEDURE — 1101F PT FALLS ASSESS-DOCD LE1/YR: CPT | Mod: CPTII,S$GLB,, | Performed by: INTERNAL MEDICINE

## 2023-11-29 PROCEDURE — 1160F PR REVIEW ALL MEDS BY PRESCRIBER/CLIN PHARMACIST DOCUMENTED: ICD-10-PCS | Mod: CPTII,S$GLB,, | Performed by: INTERNAL MEDICINE

## 2023-11-29 PROCEDURE — 3078F DIAST BP <80 MM HG: CPT | Mod: CPTII,S$GLB,, | Performed by: INTERNAL MEDICINE

## 2023-11-29 PROCEDURE — 4010F PR ACE/ARB THEARPY RXD/TAKEN: ICD-10-PCS | Mod: CPTII,S$GLB,, | Performed by: INTERNAL MEDICINE

## 2023-11-29 NOTE — PROGRESS NOTES
Subjective:      Patient ID: Radha Hoffman is a 63 y.o. female.    Chief Complaint: No chief complaint on file.    HPI:  Patient with diabetes with last A1c of 6.3 improved from 7.3.  Patient has CGM and the data previously suggested that blood sugars are under good control.  Patient does not have CGM on as she still struggling with the approval from insurance. Patient blood pressures are under good control.    Patient has chronic low back pain that radiates down the leg bilaterally.  Patient also reports weakness in bilateral leg.  Because of this patient is having difficulty walking and requested wheelchair.  For low back pain patient was also prescribed Tylenol 3 but reports patient has not taken the medicine as pain is not bad at this time.  Wheelchair was ordered on last visit has not yet received the DME.  Patient is also being followed by home health and physical therapy and occupational therapy are ordered.    Review of Systems   Constitutional:  Negative for chills, diaphoresis, fever, malaise/fatigue and weight loss.   HENT:  Negative for congestion, ear pain, sinus pain, sore throat and tinnitus.    Eyes:  Negative for blurred vision and photophobia.   Respiratory:  Negative for cough, hemoptysis, shortness of breath and wheezing.    Cardiovascular:  Negative for chest pain, palpitations, orthopnea, leg swelling and PND.   Gastrointestinal:  Negative for abdominal pain, blood in stool, constipation, diarrhea, heartburn, melena, nausea and vomiting.   Genitourinary:  Negative for dysuria, frequency and urgency.   Musculoskeletal:  Positive for back pain. Negative for myalgias and neck pain.   Skin:  Negative for rash.   Neurological:  Negative for dizziness, tremors, seizures, loss of consciousness and weakness.   Endo/Heme/Allergies:  Negative for polydipsia.   Psychiatric/Behavioral:  Negative for depression and hallucinations. The patient does not have insomnia.      Objective:   /69 (BP  "Location: Left arm, Patient Position: Sitting, BP Method: Large (Automatic))   Pulse 62   Temp 97.7 °F (36.5 °C) (Oral)   Resp 18   Ht 4' 11" (1.499 m)   Wt 61.7 kg (136 lb)   SpO2 96%   BMI 27.47 kg/m²     Physical Exam:  Patient not examined  Assessment:       ICD-10-CM ICD-9-CM   1. Type 2 diabetes mellitus with diabetic polyneuropathy, without long-term current use of insulin  E11.42 250.60     357.2   2. Blood pressure check  Z01.30 V81.1       Plan:     Patient with diabetes with last A1c 6 3 is at goal.  Patient denies any hypoglycemic episodes  Patient is on CGM.   Will try to get DME supplies for patient  Patient blood pressures are under good control after changing metoprolol to carvedilol.  Will continue medication  Patient has bilateral leg weakness and also low back pain  Patient has unsteady gait and physical therapy and occupational therapy is requested from home health  Patient need wheelchair  and that is ordered but patient has not yet received the DME  Nerve conduction studies are ordered but patient has not yet received an appointment  Will follow-up.    It is my belief that wheelchair would best benefit Ms. Radha Hoffman.  Patient has bilateral leg Weakness + congestive heart failure +  neuropathy associated with knee diabetes and is unable to safely ambulate with a cane or walker.  She is willing and able to self propel in a standard manual wheelchair  and if needed caregiver is willing to help.  Patient is undergoing physical therapy + occupational therapy.  Patient wheelchair will help with independence in home and daily living    Medication List with Changes/Refills   Current Medications    ACETAMINOPHEN (TYLENOL) 500 MG TABLET    Take 500 mg by mouth every 6 (six) hours as needed for Pain.    AMLODIPINE (NORVASC) 5 MG TABLET    Take 1 tablet (5 mg total) by mouth once daily.    ASPIRIN 81 MG CHEW    Take 1 tablet (81 mg total) by mouth once daily.    ATORVASTATIN (LIPITOR) 40 MG " "TABLET    Take 1 tablet (40 mg total) by mouth once daily.    BD ULTRA-FINE SHORT PEN NEEDLE 31 GAUGE X 5/16" NDLE    USE PEN NEEDLE FIVE TIMES DAILY    BLOOD SUGAR DIAGNOSTIC (ONETOUCH ULTRA TEST) STRP    CHECK BLOOD GLUCOSE 3 TIMES A DAY    CARVEDILOL (COREG) 6.25 MG TABLET    Take 1 tablet (6.25 mg total) by mouth 2 (two) times daily with meals.    CEPHALEXIN (KEFLEX) 250 MG CAPSULE    Take 1 capsule (250 mg total) by mouth Daily.    DULOXETINE (CYMBALTA) 60 MG CAPSULE    Take 60 mg by mouth once.    FUROSEMIDE (LASIX) 40 MG TABLET    Take 1 tablet (40 mg total) by mouth once daily. Take 1 tablet a day    GABAPENTIN (NEURONTIN) 300 MG CAPSULE    TAKE 1 CAPSULE BY MOUTH THREE TIMES DAILY    HYDRALAZINE (APRESOLINE) 50 MG TABLET    Take 1 tablet (50 mg total) by mouth every 12 (twelve) hours.    INSULIN ASPART U-100 (NOVOLOG FLEXPEN U-100 INSULIN) 100 UNIT/ML (3 ML) INPN PEN    Inject 5 Units into the skin 3 (three) times daily with meals.    LANCETS 32 GAUGE MISC    1 lancet by Misc.(Non-Drug; Combo Route) route 3 (three) times daily.    LANTUS SOLOSTAR U-100 INSULIN GLARGINE 100 UNITS/ML SUBQ PEN    Inject 12 Units into the skin once daily.    LOSARTAN (COZAAR) 100 MG TABLET    TAKE 1 TABLET BY MOUTH ONCE DAILY FOR 30 DAYS    METOLAZONE (ZAROXOLYN) 2.5 MG TABLET    TAKE 1 TABLET BY MOUTH EVERY OTHER DAY FOR 5 DOSES    MIRTAZAPINE (REMERON) 15 MG TABLET    Take 15 mg by mouth every evening.    MONTELUKAST (SINGULAIR) 10 MG TABLET    TAKE 1 TABLET BY MOUTH ONCE DAILY FOR 30 DAYS    OMEPRAZOLE (PRILOSEC) 40 MG CAPSULE    Take 1 capsule (40 mg total) by mouth once daily.   Discontinued Medications    CIPROFLOXACIN HCL (CIPRO) 500 MG TABLET    Take 500 mg by mouth every 12 (twelve) hours.        "

## 2023-12-15 ENCOUNTER — TELEPHONE (OUTPATIENT)
Dept: PRIMARY CARE CLINIC | Facility: CLINIC | Age: 63
End: 2023-12-15
Payer: MEDICARE

## 2023-12-19 ENCOUNTER — TELEPHONE (OUTPATIENT)
Dept: PRIMARY CARE CLINIC | Facility: CLINIC | Age: 63
End: 2023-12-19
Payer: MEDICARE

## 2023-12-19 NOTE — TELEPHONE ENCOUNTER
----- Message from Susanna Hernandez MA sent at 12/18/2023  3:34 PM CST -----  Contact: [facility] [employer]    ----- Message -----  From: Rebekah Lemos  Sent: 12/18/2023   3:02 PM CST  To: Priyanka Nowak Staff    Type: Staff Message    Who called: [facility] [employer]   Call back number: [phone]  Reason for the call: Checking on status of faxed request for Dexcom G7 freeform machine - it was faxed on 12/11.  Additional information: N/a

## 2023-12-19 NOTE — TELEPHONE ENCOUNTER
Returned call to patient's daughter and advised forms were faxed to [facility], she can give them a call later today or tomorrow. Maya requested addendum to patient's note and it was sent 12/15/23 and 12/19/23.

## 2023-12-19 NOTE — TELEPHONE ENCOUNTER
----- Message from Susanna Hernandez MA sent at 12/18/2023  3:35 PM CST -----  Regarding: FW: Problems and Concerns  Contact: Leanna,daughter    ----- Message -----  From: Frannie Zeng  Sent: 12/18/2023   1:22 PM CST  To: Priyanka Nowak Staff  Subject: Problems and Concerns                            Per phone call with Leanna, she stated that Marc has not received the Clinical notes and [facility] are still waiting on the physician to approve it so they can mail her supplies out to her.  Please return call at [phone].    Thanks,  SJ

## 2023-12-20 NOTE — TELEPHONE ENCOUNTER
Thuy chan/ [facility] states, for about 2 days now the patient has had a productive cough and is wheezing in left and right upper lobe. Returned call to adelaide Daley message regarding does patient have fever?  S/w patient's daughter and she states patient was exposed to the flu last week and had fatigue/ weakness yesterday.

## 2023-12-20 NOTE — TELEPHONE ENCOUNTER
----- Message from Ivory Pruitt sent at 12/20/2023 11:21 AM CST -----  Type:  Patient Returning Call    Who Called: Malathi chan/  [facility]   Who Left Message for Patient:Tara  Does the patient know what this is regarding?:-  Would the patient rather a call back or a response via [facility]?    Best Call Back Number:[phone]  Additional Information: Returning Tara's call

## 2023-12-20 NOTE — TELEPHONE ENCOUNTER
----- Message from Mimi Zambrano sent at 12/20/2023 10:06 AM CST -----  Contact: Thuy([facility])  Thuy called to consult with nurse or staff regarding the patients diabetic device. She states the patients device needs to be called out to her pharmacy but she is not sure which one she has. She states the patients device could either be the dexcomm or freestyle hernando. She also states for about 2 days now the patient has had a productive cough and is wheezing in left and right upper lobe. Thuy would like a call back regarding this and can be reached at [phone]. Thanks/MR

## 2023-12-20 NOTE — TELEPHONE ENCOUNTER
S/w patient's daughter and advised to bring patient to Urgent Care for eval. Leanna verbalized understanding.

## 2023-12-21 ENCOUNTER — TELEPHONE (OUTPATIENT)
Dept: PRIMARY CARE CLINIC | Facility: CLINIC | Age: 63
End: 2023-12-21
Payer: MEDICARE

## 2023-12-21 NOTE — TELEPHONE ENCOUNTER
----- Message from Kacy Roche sent at 12/21/2023  2:04 PM CST -----  Patient daughter is calling in regards to pharmacy is waiting on fax for wheelchair...Please fax the back...              Thanks  ninoska

## 2023-12-27 ENCOUNTER — TELEPHONE (OUTPATIENT)
Dept: PRIMARY CARE CLINIC | Facility: CLINIC | Age: 63
End: 2023-12-27
Payer: MEDICARE

## 2023-12-27 NOTE — TELEPHONE ENCOUNTER
----- Message from Leyda Vasquez MA sent at 12/22/2023  9:38 AM CST -----  Contact: Leanna    ----- Message -----  From: Coreen Celaya  Sent: 12/22/2023   9:27 AM CST  To: Priyanka Nowak Staff    Patient's daughter is calling about paperwork and  diabetic device that  it did not work. Please call back @ [phone]

## 2024-01-05 ENCOUNTER — OFFICE VISIT (OUTPATIENT)
Dept: PRIMARY CARE CLINIC | Facility: CLINIC | Age: 64
End: 2024-01-05
Payer: MEDICARE

## 2024-01-05 VITALS
HEIGHT: 59 IN | WEIGHT: 137 LBS | SYSTOLIC BLOOD PRESSURE: 86 MMHG | BODY MASS INDEX: 27.62 KG/M2 | OXYGEN SATURATION: 96 % | DIASTOLIC BLOOD PRESSURE: 58 MMHG | RESPIRATION RATE: 18 BRPM | HEART RATE: 58 BPM | TEMPERATURE: 98 F

## 2024-01-05 DIAGNOSIS — I25.118 CORONARY ARTERY DISEASE OF NATIVE ARTERY OF NATIVE HEART WITH STABLE ANGINA PECTORIS: ICD-10-CM

## 2024-01-05 DIAGNOSIS — N18.32 STAGE 3B CHRONIC KIDNEY DISEASE: ICD-10-CM

## 2024-01-05 DIAGNOSIS — F33.42 RECURRENT MAJOR DEPRESSIVE DISORDER, IN FULL REMISSION: ICD-10-CM

## 2024-01-05 DIAGNOSIS — K25.0 ACUTE GASTRIC ULCER WITH HEMORRHAGE: ICD-10-CM

## 2024-01-05 DIAGNOSIS — E11.42 TYPE 2 DIABETES MELLITUS WITH DIABETIC POLYNEUROPATHY, WITHOUT LONG-TERM CURRENT USE OF INSULIN: Primary | ICD-10-CM

## 2024-01-05 PROBLEM — K92.2 GI BLEED: Status: ACTIVE | Noted: 2024-01-05

## 2024-01-05 PROBLEM — E86.0 LUETSCHER'S SYNDROME: Status: ACTIVE | Noted: 2024-01-05

## 2024-01-05 PROBLEM — I50.9 CHF (CONGESTIVE HEART FAILURE): Status: RESOLVED | Noted: 2023-11-08 | Resolved: 2024-01-05

## 2024-01-05 LAB — GLUCOSE SERPL-MCNC: 140 MG/DL (ref 70–110)

## 2024-01-05 PROCEDURE — 3008F BODY MASS INDEX DOCD: CPT | Mod: CPTII,S$GLB,, | Performed by: INTERNAL MEDICINE

## 2024-01-05 PROCEDURE — 1160F RVW MEDS BY RX/DR IN RCRD: CPT | Mod: CPTII,S$GLB,, | Performed by: INTERNAL MEDICINE

## 2024-01-05 PROCEDURE — 3078F DIAST BP <80 MM HG: CPT | Mod: CPTII,S$GLB,, | Performed by: INTERNAL MEDICINE

## 2024-01-05 PROCEDURE — 1101F PT FALLS ASSESS-DOCD LE1/YR: CPT | Mod: CPTII,S$GLB,, | Performed by: INTERNAL MEDICINE

## 2024-01-05 PROCEDURE — 1157F ADVNC CARE PLAN IN RCRD: CPT | Mod: CPTII,S$GLB,, | Performed by: INTERNAL MEDICINE

## 2024-01-05 PROCEDURE — 3074F SYST BP LT 130 MM HG: CPT | Mod: CPTII,S$GLB,, | Performed by: INTERNAL MEDICINE

## 2024-01-05 PROCEDURE — 3288F FALL RISK ASSESSMENT DOCD: CPT | Mod: CPTII,S$GLB,, | Performed by: INTERNAL MEDICINE

## 2024-01-05 PROCEDURE — 99215 OFFICE O/P EST HI 40 MIN: CPT | Mod: S$GLB,,, | Performed by: INTERNAL MEDICINE

## 2024-01-05 PROCEDURE — 1159F MED LIST DOCD IN RCRD: CPT | Mod: CPTII,S$GLB,, | Performed by: INTERNAL MEDICINE

## 2024-01-05 PROCEDURE — 82962 GLUCOSE BLOOD TEST: CPT | Mod: ,,, | Performed by: INTERNAL MEDICINE

## 2024-01-05 RX ORDER — INSULIN ASPART 100 [IU]/ML
INJECTION, SOLUTION INTRAVENOUS; SUBCUTANEOUS
COMMUNITY
End: 2024-01-05

## 2024-01-05 RX ORDER — SUCRALFATE 1 G/10ML
1 SUSPENSION ORAL 4 TIMES DAILY
Qty: 40 EACH | Refills: 0 | Status: SHIPPED | OUTPATIENT
Start: 2024-01-05

## 2024-01-05 NOTE — PROGRESS NOTES
Subjective:      Patient ID: Radha Hoffman is a 63 y.o. female.    Chief Complaint: Follow-up ( )    HPI:  Patient with diabetes with last A1c of 6.3 is improved from 7.3.  Patient home blood sugars are under good control.  Patient is on Lantus 12 units and Humalog 6 units before each meal.  Patient has hypertension and is on multiple antihypertensive medication.  Patient blood pressure today are running very low.  Any dizziness lightheadedness, chest pain, shortness on breath.  Patient reports compliance with medication.  Patient has questionable history of congestive heart failure and is on multiple diuretics including Lasix and metolazone.  Echocardiogram is not available.  Called Dr. Barrios office and he reported that last echocardiogram was done in September 2023 that showed normal EF and diastolic function.  Patient was prescribed Lasix for bilateral leg swelling and there is no diagnosis of congestive heart failure.     Patient was recently evaluated in ER secondary to black color stool and diarrhea.  EGD was performed and that showed stomach erosion, small ulcer at antrum and large ulcer at gastro-esophageal junction.  Patient was started on Protonix twice a day.  Patient denies any blood in stool black color stool but reports some epigastric pain.     Review of Systems   Constitutional:  Negative for chills, diaphoresis, fever, malaise/fatigue and weight loss.   HENT:  Negative for congestion, ear pain, sinus pain, sore throat and tinnitus.    Eyes:  Negative for blurred vision and photophobia.   Respiratory:  Negative for cough, hemoptysis, shortness of breath and wheezing.    Cardiovascular:  Negative for chest pain, palpitations, orthopnea, leg swelling and PND.   Gastrointestinal:  Negative for abdominal pain, blood in stool, constipation, diarrhea, heartburn, melena, nausea and vomiting.   Genitourinary:  Negative for dysuria, frequency and urgency.   Musculoskeletal:  Negative for back pain,  "myalgias and neck pain.   Skin:  Negative for rash.   Neurological:  Negative for dizziness, tremors, seizures, loss of consciousness and weakness.   Endo/Heme/Allergies:  Negative for polydipsia.   Psychiatric/Behavioral:  Negative for depression and hallucinations. The patient does not have insomnia.      Objective:   BP (!) 86/42 (BP Location: Left arm, Patient Position: Sitting, BP Method: Large (Automatic))   Pulse 60   Temp 97.5 °F (36.4 °C) (Oral)   Resp 18   Ht 4' 11" (1.499 m)   Wt 62.1 kg (137 lb)   SpO2 96%   BMI 27.67 kg/m²     Physical Exam: Patient not examined  Assessment:       ICD-10-CM ICD-9-CM   1. Type 2 diabetes mellitus with diabetic polyneuropathy, without long-term current use of insulin  E11.42 250.60     357.2       Plan:     Patient with diabetes with last A1c of 6.3 is at goal.  Patient has CGM and blood sugars are monitored closely.  Will continue same medication for now  Patient has hypertension and is on multiple blood pressure medication  Patient blood pressures are running very low but patient denies any symptoms  Patient was recently admitted with GI bleed with hemoglobin of 5.6  Patient received multiple transfusions + also had multiple stomach ulcers  Has some abdominal pain Will use Carafate.  Will stop aspirin  I am concerned about low blood pressure Will refer patient to ER  Talked to Dr. Osorio and discuss case with her  Patient may need IV fluid to bring up the blood pressures  Close follow-up is recommended  As patient blood pressures are running low I have advised to stop all medication  And if blood pressure go higher than 140 to start losartan and call my office  Discussed with patient cardiologist Dr. Barrios, patient EF is normal and no diastolic dysfunction noted  Was on diuretic secondary to pedal edema..  Edema has resolved  Will .  Diuretics at this time.    Total time spent in patient care is more than 40 minutes    Medication List with Changes/Refills " "  Current Medications    ACETAMINOPHEN (TYLENOL) 500 MG TABLET    Take 500 mg by mouth every 6 (six) hours as needed for Pain.    AMLODIPINE (NORVASC) 5 MG TABLET    Take 1 tablet (5 mg total) by mouth once daily.    ASPIRIN 81 MG CHEW    Take 1 tablet (81 mg total) by mouth once daily.    ATORVASTATIN (LIPITOR) 40 MG TABLET    Take 1 tablet (40 mg total) by mouth once daily.    BD ULTRA-FINE SHORT PEN NEEDLE 31 GAUGE X 5/16" NDLE    USE PEN NEEDLE FIVE TIMES DAILY    BLOOD SUGAR DIAGNOSTIC (ONETOUCH ULTRA TEST) STR    CHECK BLOOD GLUCOSE 3 TIMES A DAY    CARVEDILOL (COREG) 6.25 MG TABLET    Take 1 tablet (6.25 mg total) by mouth 2 (two) times daily with meals.    CEPHALEXIN (KEFLEX) 250 MG CAPSULE    Take 1 capsule (250 mg total) by mouth Daily.    DULOXETINE (CYMBALTA) 60 MG CAPSULE    Take 60 mg by mouth once.    FUROSEMIDE (LASIX) 40 MG TABLET    Take 1 tablet (40 mg total) by mouth once daily. Take 1 tablet a day    GABAPENTIN (NEURONTIN) 300 MG CAPSULE    TAKE 1 CAPSULE BY MOUTH THREE TIMES DAILY    HYDRALAZINE (APRESOLINE) 50 MG TABLET    Take 1 tablet (50 mg total) by mouth every 12 (twelve) hours.    INSULIN ASPART U-100 (NOVOLOG FLEXPEN U-100 INSULIN) 100 UNIT/ML (3 ML) INPN PEN    Inject 5 Units into the skin 3 (three) times daily with meals.    INSULIN ASPART U-100 (NOVOLOG) 100 UNIT/ML (3 ML) INPN PEN    Three Times A Day    LANCETS 32 GAUGE MISC    1 lancet by Misc.(Non-Drug; Combo Route) route 3 (three) times daily.    LANTUS SOLOSTAR U-100 INSULIN GLARGINE 100 UNITS/ML SUBQ PEN    Inject 12 Units into the skin once daily.    LOSARTAN (COZAAR) 100 MG TABLET    TAKE 1 TABLET BY MOUTH ONCE DAILY FOR 30 DAYS    METOLAZONE (ZAROXOLYN) 2.5 MG TABLET    TAKE 1 TABLET BY MOUTH EVERY OTHER DAY FOR 5 DOSES    MIRTAZAPINE (REMERON) 15 MG TABLET    Take 15 mg by mouth every evening.    MONTELUKAST (SINGULAIR) 10 MG TABLET    TAKE 1 TABLET BY MOUTH ONCE DAILY FOR 30 DAYS    OMEPRAZOLE (PRILOSEC) 40 MG CAPSULE    " Take 1 capsule (40 mg total) by mouth once daily.

## 2024-01-20 ENCOUNTER — EXTERNAL HOME HEALTH (OUTPATIENT)
Dept: HOME HEALTH SERVICES | Facility: HOSPITAL | Age: 64
End: 2024-01-20
Payer: MEDICARE

## 2024-01-23 ENCOUNTER — OUTSIDE PLACE OF SERVICE (OUTPATIENT)
Dept: GASTROENTEROLOGY | Facility: CLINIC | Age: 64
End: 2024-01-23
Payer: MEDICARE

## 2024-01-23 PROCEDURE — 99204 OFFICE O/P NEW MOD 45 MIN: CPT | Mod: ,,, | Performed by: INTERNAL MEDICINE

## 2024-01-30 ENCOUNTER — DOCUMENT SCAN (OUTPATIENT)
Dept: HOME HEALTH SERVICES | Facility: HOSPITAL | Age: 64
End: 2024-01-30
Payer: MEDICARE

## 2024-02-13 ENCOUNTER — TELEPHONE (OUTPATIENT)
Dept: PRIMARY CARE CLINIC | Facility: CLINIC | Age: 64
End: 2024-02-13
Payer: MEDICARE

## 2024-02-13 NOTE — TELEPHONE ENCOUNTER
Phoned patient's daughter Leanna regarding need for bedside commode, she states patient is at inpatient rehab, [facility]. Advised to schedule f/u appt when patient is discharged to discuss medication and order for DME.

## 2024-02-20 ENCOUNTER — OFFICE VISIT (OUTPATIENT)
Dept: UROLOGY | Facility: CLINIC | Age: 64
End: 2024-02-20
Payer: MEDICARE

## 2024-02-20 VITALS
SYSTOLIC BLOOD PRESSURE: 122 MMHG | HEIGHT: 59 IN | BODY MASS INDEX: 27.62 KG/M2 | OXYGEN SATURATION: 95 % | WEIGHT: 137 LBS | HEART RATE: 73 BPM | DIASTOLIC BLOOD PRESSURE: 78 MMHG

## 2024-02-20 DIAGNOSIS — R82.90 ABNORMAL URINALYSIS: ICD-10-CM

## 2024-02-20 DIAGNOSIS — L98.9 SKIN LESION: ICD-10-CM

## 2024-02-20 DIAGNOSIS — N39.0 URINARY TRACT INFECTION WITHOUT HEMATURIA, SITE UNSPECIFIED: Primary | ICD-10-CM

## 2024-02-20 LAB
BILIRUBIN, UA POC OHS: NEGATIVE
BLOOD, UA POC OHS: NEGATIVE
CLARITY, UA POC OHS: ABNORMAL
COLOR, UA POC OHS: YELLOW
GLUCOSE, UA POC OHS: 500
KETONES, UA POC OHS: NEGATIVE
LEUKOCYTES, UA POC OHS: ABNORMAL
NITRITE, UA POC OHS: NEGATIVE
PH, UA POC OHS: 5.5
PROTEIN, UA POC OHS: 100
SPECIFIC GRAVITY, UA POC OHS: 1.02
UROBILINOGEN, UA POC OHS: 0.2

## 2024-02-20 PROCEDURE — 1157F ADVNC CARE PLAN IN RCRD: CPT | Mod: CPTII,S$GLB,, | Performed by: NURSE PRACTITIONER

## 2024-02-20 PROCEDURE — 51701 INSERT BLADDER CATHETER: CPT | Mod: S$GLB,,, | Performed by: NURSE PRACTITIONER

## 2024-02-20 PROCEDURE — 3008F BODY MASS INDEX DOCD: CPT | Mod: CPTII,S$GLB,, | Performed by: NURSE PRACTITIONER

## 2024-02-20 PROCEDURE — 99214 OFFICE O/P EST MOD 30 MIN: CPT | Mod: 25,S$GLB,, | Performed by: NURSE PRACTITIONER

## 2024-02-20 PROCEDURE — 3074F SYST BP LT 130 MM HG: CPT | Mod: CPTII,S$GLB,, | Performed by: NURSE PRACTITIONER

## 2024-02-20 PROCEDURE — 81003 URINALYSIS AUTO W/O SCOPE: CPT | Mod: QW,S$GLB,, | Performed by: NURSE PRACTITIONER

## 2024-02-20 PROCEDURE — 1160F RVW MEDS BY RX/DR IN RCRD: CPT | Mod: CPTII,S$GLB,, | Performed by: NURSE PRACTITIONER

## 2024-02-20 PROCEDURE — 1101F PT FALLS ASSESS-DOCD LE1/YR: CPT | Mod: CPTII,S$GLB,, | Performed by: NURSE PRACTITIONER

## 2024-02-20 PROCEDURE — 3288F FALL RISK ASSESSMENT DOCD: CPT | Mod: CPTII,S$GLB,, | Performed by: NURSE PRACTITIONER

## 2024-02-20 PROCEDURE — 3078F DIAST BP <80 MM HG: CPT | Mod: CPTII,S$GLB,, | Performed by: NURSE PRACTITIONER

## 2024-02-20 PROCEDURE — 1159F MED LIST DOCD IN RCRD: CPT | Mod: CPTII,S$GLB,, | Performed by: NURSE PRACTITIONER

## 2024-02-20 RX ORDER — BUSPIRONE HYDROCHLORIDE 10 MG/1
10 TABLET ORAL 3 TIMES DAILY
COMMUNITY

## 2024-02-20 RX ORDER — NITROFURANTOIN 25; 75 MG/1; MG/1
100 CAPSULE ORAL 2 TIMES DAILY
Qty: 14 CAPSULE | Refills: 0 | Status: SHIPPED | OUTPATIENT
Start: 2024-02-20 | End: 2024-02-27

## 2024-02-20 RX ORDER — ASPIRIN 81 MG/1
81 TABLET ORAL DAILY
COMMUNITY

## 2024-02-20 RX ORDER — FLUCONAZOLE 150 MG/1
150 TABLET ORAL DAILY
Qty: 2 TABLET | Refills: 0 | Status: SHIPPED | OUTPATIENT
Start: 2024-02-20 | End: 2024-02-22

## 2024-02-20 RX ORDER — CARVEDILOL 25 MG/1
25 TABLET ORAL 2 TIMES DAILY WITH MEALS
COMMUNITY

## 2024-02-20 RX ORDER — HYDRALAZINE HYDROCHLORIDE 50 MG/1
50 TABLET, FILM COATED ORAL 3 TIMES DAILY
COMMUNITY

## 2024-02-20 NOTE — PROGRESS NOTES
Using aseptic technique, I&O cath ua obtained. Apox 40cc to lab for ua then 50cc thick yellow fluid drained into beaker. After wiping pt's genital area with , MC swabbed 2 open areas on pt's vagina and sent for culture.

## 2024-02-20 NOTE — PROGRESS NOTES
Subjective:       Patient ID: Radha Hoffman is a 64 y.o. female.    Chief Complaint: Urinary Tract Infection      HPI: 64-year-old female, established patient, last seen 6 months ago.    Patient has history of frequent UTIs.    Patient is currently in a rehab facility.  She has had some neuropathy in his currently wheelchair-bound.  She is in rehab facility to try to get her strength back to ups aching start walking again.    Patient was on Keflex 250 mg daily.  However, since being in the rehab facility, she is no longer on Keflex 250 mg daily.    At this time she is complaining of pain or burning urination, frequency, urgency, and odor to the urine.    She denies any fever or body aches.  Denies any blood in the urine.    No other urinary complaints at this time.         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 Abdalla    Stomach ulcer     Urinary retention     Urinary tract infection        Past Surgical Historical:   Past Surgical History:   Procedure Laterality Date    CATARACT EXTRACTION, BILATERAL      Catherization of heart      endo scopy      HYSTERECTOMY          Medications:   Medication List with Changes/Refills   New Medications    FLUCONAZOLE (DIFLUCAN) 150 MG TAB    Take 1 tablet (150 mg total) by mouth once daily. One by mouth now and repeat x1 on day 3 for 2 doses    NITROFURANTOIN, MACROCRYSTAL-MONOHYDRATE, (MACROBID) 100 MG CAPSULE    Take 1 capsule (100 mg total) by mouth 2 (two)  "times daily. for 7 days   Current Medications    ACETAMINOPHEN (TYLENOL) 500 MG TABLET    Take 500 mg by mouth every 6 (six) hours as needed for Pain.    ASPIRIN (ECOTRIN) 81 MG EC TABLET    Take 81 mg by mouth once daily.    ATORVASTATIN (LIPITOR) 40 MG TABLET    Take 1 tablet (40 mg total) by mouth once daily.    BD ULTRA-FINE SHORT PEN NEEDLE 31 GAUGE X 5/16" NDLE    USE PEN NEEDLE FIVE TIMES DAILY    BLOOD SUGAR [facility]    CHECK BLOOD GLUCOSE 3 TIMES A DAY    BUSPIRONE (BUSPAR) 10 MG TABLET    Take 10 mg by mouth 3 (three) times daily.    CARVEDILOL (COREG) 25 MG TABLET    Take 25 mg by mouth 2 (two) times daily with meals.    CEPHALEXIN (KEFLEX) 250 MG CAPSULE    Take 1 capsule (250 mg total) by mouth Daily.    DULOXETINE (CYMBALTA) 60 MG CAPSULE    Take 60 mg by mouth once.    EPOETIN JORGE L 2,000 UNIT/ML SOLN 2,000 UNITS, EPOETIN JORGE L 3,000 UNIT/ML SOLN 3,000 UNITS INJECTION    Inject 1,000 Units into the skin once.    GABAPENTIN (NEURONTIN) 300 MG CAPSULE    TAKE 1 CAPSULE BY MOUTH THREE TIMES DAILY    HYDRALAZINE (APRESOLINE) 50 MG TABLET    Take 50 mg by mouth 3 (three) times daily.    INSULIN ASPART U-100 (NOVOLOG FLEXPEN U-100 INSULIN) 100 UNIT/ML (3 ML) INPN PEN    Inject 5 Units into the skin 3 (three) times daily with meals.    LANCETS 32 GAUGE MISC    1 lancet by Misc.(Non-Drug; Combo Route) route 3 (three) times daily.    LANTUS SOLOSTAR U-100 INSULIN GLARGINE 100 UNITS/ML SUBQ PEN    Inject 12 Units into the skin once daily.    MIRTAZAPINE (REMERON) 15 MG TABLET    Take 15 mg by mouth every evening.    MONTELUKAST (SINGULAIR) 10 MG TABLET    TAKE 1 TABLET BY MOUTH ONCE DAILY FOR 30 DAYS    OMEPRAZOLE (PRILOSEC) 40 MG CAPSULE    Take 1 capsule (40 mg total) by mouth once daily.    SUCRALFATE (CARAFATE) 100 MG/ML SUSPENSION    Take 10 mLs (1 g total) by mouth 4 (four) times daily.        Past Social History:   Social History     Socioeconomic History    Marital status: " Single    Number of children: 3   Occupational History    Occupation: nurses aide   Tobacco Use    Smoking status: Never    Smokeless tobacco: Never   Substance and Sexual Activity    Alcohol use: No    Drug use: No    Sexual activity: Never     Social Determinants of Health     Financial Resource Strain: Low Risk  (3/11/2019)    Overall Financial Resource Strain (CARDIA)     Difficulty of Paying Living Expenses: Not hard at all   Food Insecurity: No Food Insecurity (3/11/2019)    Hunger Vital Sign     Worried About Running Out of Food in the Last Year: Never true     Ran Out of Food in the Last Year: Never true   Transportation Needs: No Transportation Needs (3/11/2019)    PRAPARE - Transportation     Lack of Transportation (Medical): No     Lack of Transportation (Non-Medical): No   Physical Activity: Insufficiently Active (3/11/2019)    Exercise Vital Sign     Days of Exercise per Week: 1 day     Minutes of Exercise per Session: 60 min   Stress: No Stress Concern Present (3/11/2019)    [employer] - Occupational Stress Questionnaire     Feeling of Stress : Not at all   Social Connections: Unknown (3/11/2019)    Social Connection and Isolation Panel [NHANES]     Frequency of Communication with Friends and Family: More than three times a week     Frequency of Social Gatherings with Friends and Family: More than three times a week     Attends Confucianist Services: More than 4 times per year     Active Member of Clubs or Organizations: No     Attends Club or Organization Meetings: Never       Allergies:   Review of patient's allergies indicates:   Allergen Reactions    Metformin     Phenazopyridine     Sulfa (sulfonamide antibiotics)     Sulfamethoxazole         Family History:   Family History   Problem Relation Age of Onset    Arthritis Mother     Depression Mother     Diabetes Mother     Heart disease Mother     Hypertension Mother     Arthritis Father     Depression Father     Arthritis  Sister     Diabetes Sister     Arthritis Brother         Review of Systems:  Review of Systems   Constitutional:  Negative for activity change and appetite change.   HENT:  Negative for congestion and dental problem.    Respiratory:  Negative for chest tightness and shortness of breath.    Cardiovascular:  Negative for chest pain.   Gastrointestinal:  Negative for abdominal distention and abdominal pain.   Genitourinary:  Positive for dysuria, frequency and urgency. Negative for decreased urine volume, difficulty urinating, dyspareunia, enuresis, flank pain, genital sores, hematuria and pelvic pain.   Musculoskeletal:  Negative for back pain and neck pain.   Allergic/Immunologic: Negative for immunocompromised state.   Neurological:  Negative for dizziness.   Hematological:  Negative for adenopathy.   Psychiatric/Behavioral:  Negative for agitation, behavioral problems and confusion.        Physical Exam:  Physical Exam  Vitals and nursing note reviewed.   Constitutional:       Appearance: She is well-developed.   HENT:      Head: Normocephalic.   Eyes:      Pupils: Pupils are equal, round, and reactive to light.   Cardiovascular:      Rate and Rhythm: Normal rate and regular rhythm.      Heart sounds: Normal heart sounds.   Pulmonary:      Effort: Pulmonary effort is normal.      Breath sounds: Normal breath sounds.   Abdominal:      General: Bowel sounds are normal.      Palpations: Abdomen is soft.   Genitourinary:         Comments: 2 Lesions noted to the left labia majora.  Musculoskeletal:         General: Normal range of motion.      Cervical back: Normal range of motion and neck supple.   Skin:     General: Skin is warm and dry.   Neurological:      Mental Status: She is alert and oriented to person, place, and time.   Psychiatric:         Mood and Affect: Mood normal.         Behavior: Behavior normal.       Urinalysis:  Moderate leukocytes, white blood cells 100+, epithelial +/minus, bacteria +4, +2  yeast.    Assessment/Plan:   1. UTI/abnormal urinalysis:  Start the patient on Macrobid.  Will culture urine.  Will change antibiotics if indicated.    Will start patient on Diflucan for yeast noted on urine.      2. Skin lesion:  Patient has 2 lesions on the left labia majora.    Lesions look hepatic.  However, could be yeast.  Culture swab collected    Will tentatively plan follow-up in 6 months, sooner if needed.  Problem List Items Addressed This Visit    None  Visit Diagnoses       Urinary tract infection without hematuria, site unspecified    -  Primary    Relevant Medications    nitrofurantoin, macrocrystal-monohydrate, (MACROBID) 100 MG capsule    Other Relevant Orders    POCT Urinalysis(Instrument) (Completed)    Urine culture    Skin lesion        Relevant Orders    Aerobic culture    Abnormal urinalysis        Relevant Medications    nitrofurantoin, macrocrystal-monohydrate, (MACROBID) 100 MG capsule    fluconazole (DIFLUCAN) 150 MG Tab    Other Relevant Orders    Urine culture

## 2024-02-22 ENCOUNTER — TELEPHONE (OUTPATIENT)
Dept: UROLOGY | Facility: CLINIC | Age: 64
End: 2024-02-22
Payer: MEDICARE

## 2024-02-22 LAB
CULTURE: NORMAL
URINE CULTURE, ROUTINE: NORMAL

## 2024-02-22 NOTE — TELEPHONE ENCOUNTER
Called and left a message for pt to call back to get the culture results         ----- Message from Georges Lord NP sent at 2/22/2024  4:30 PM CST -----  There is no growth noted on wound culture.  Urine culture showed E coli.  Continue Macrobid as directed.    Follow-up as scheduled, sooner if needed.

## 2024-03-04 ENCOUNTER — DOCUMENT SCAN (OUTPATIENT)
Dept: HOME HEALTH SERVICES | Facility: HOSPITAL | Age: 64
End: 2024-03-04
Payer: MEDICARE

## 2024-04-08 PROBLEM — K92.2 GI BLEED: Status: RESOLVED | Noted: 2024-01-05 | Resolved: 2024-04-08

## 2024-06-13 ENCOUNTER — PATIENT MESSAGE (OUTPATIENT)
Dept: ADMINISTRATIVE | Facility: HOSPITAL | Age: 64
End: 2024-06-13
Payer: MEDICARE

## 2024-12-02 NOTE — TELEPHONE ENCOUNTER
"----- Message from Ivory Pruitt sent at 2/18/2022  9:42 AM CST -----  Type:  Needs Medical Advice    Who Called: Radha Howardkian    Symptoms (please be specific): -   How long has patient had these symptoms:  -  Pharmacy name and phone #:  -  Would the patient rather a call back or a response via [facility]?    Best Call Back Number: [phone]    Additional Information:  pt needs to speak w/ nurse, about her script for BD VEO INSULIN SYRINGE UF 1 mL 31 gauge x 15/64" Vasu, pt says she has been calling since Monday    " Therapist received referral for lymphedema clinic.  Therapist called patient regarding scheduling and therapist required to leave voice message for patient to return call to [facility] at [phone]